# Patient Record
Sex: FEMALE | Race: OTHER | Employment: OTHER
[De-identification: names, ages, dates, MRNs, and addresses within clinical notes are randomized per-mention and may not be internally consistent; named-entity substitution may affect disease eponyms.]

---

## 2017-01-03 ENCOUNTER — CARE COORDINATION (OUTPATIENT)
Dept: CARE COORDINATION | Facility: CLINIC | Age: 77
End: 2017-01-03

## 2017-02-11 ENCOUNTER — HOSPITAL ENCOUNTER (OUTPATIENT)
Dept: MRI IMAGING | Age: 77
Discharge: HOME | End: 2017-02-11
Payer: MEDICARE

## 2017-02-11 ENCOUNTER — HOSPITAL ENCOUNTER (OUTPATIENT)
Dept: VASCULAR LAB | Age: 77
Discharge: HOME | End: 2017-02-11
Payer: MEDICARE

## 2017-02-11 DIAGNOSIS — R56.9 SEIZURE (HCC): ICD-10-CM

## 2017-02-11 DIAGNOSIS — R26.0 ATAXIC GAIT: ICD-10-CM

## 2017-02-11 DIAGNOSIS — E11.42 DIABETIC POLYRADICULOPATHY ASSOCIATED WITH TYPE 2 DIABETES MELLITUS (HCC): ICD-10-CM

## 2017-02-11 DIAGNOSIS — R55 SYNCOPE, UNSPECIFIED SYNCOPE TYPE: ICD-10-CM

## 2017-02-11 PROCEDURE — 70551 MRI BRAIN STEM W/O DYE: CPT

## 2017-02-11 PROCEDURE — 93880 EXTRACRANIAL BILAT STUDY: CPT

## 2017-02-11 PROCEDURE — 70551 MRI BRAIN STEM W/O DYE: CPT | Performed by: RADIOLOGY

## 2017-03-02 ENCOUNTER — CARE COORDINATION (OUTPATIENT)
Dept: CARE COORDINATION | Facility: CLINIC | Age: 77
End: 2017-03-02

## 2017-03-31 ENCOUNTER — HOSPITAL ENCOUNTER (OUTPATIENT)
Dept: GENERAL RADIOLOGY | Facility: CLINIC | Age: 77
Discharge: HOME | End: 2017-03-31
Payer: MEDICARE

## 2017-03-31 ENCOUNTER — HOSPITAL ENCOUNTER (OUTPATIENT)
Facility: CLINIC | Age: 77
Discharge: HOME | End: 2017-03-31
Payer: MEDICARE

## 2017-03-31 DIAGNOSIS — J40 BRONCHITIS: ICD-10-CM

## 2017-03-31 PROBLEM — C90.00 MULTIPLE MYELOMA (HCC): Status: ACTIVE | Noted: 2017-03-31

## 2017-03-31 PROBLEM — G40.909 SEIZURE DISORDER (HCC): Status: ACTIVE | Noted: 2017-03-31

## 2017-03-31 PROBLEM — G47.33 OSA (OBSTRUCTIVE SLEEP APNEA): Status: ACTIVE | Noted: 2017-03-31

## 2017-03-31 PROCEDURE — 71020 XR CHEST STANDARD TWO VW: CPT

## 2017-04-26 ENCOUNTER — CARE COORDINATION (OUTPATIENT)
Dept: CARE COORDINATION | Age: 77
End: 2017-04-26

## 2017-05-07 ENCOUNTER — HOSPITAL ENCOUNTER (EMERGENCY)
Age: 77
Discharge: HOME | End: 2017-05-07
Attending: EMERGENCY MEDICINE
Payer: MEDICARE

## 2017-05-07 VITALS
WEIGHT: 250 LBS | HEART RATE: 57 BPM | BODY MASS INDEX: 47.2 KG/M2 | OXYGEN SATURATION: 98 % | SYSTOLIC BLOOD PRESSURE: 138 MMHG | DIASTOLIC BLOOD PRESSURE: 61 MMHG | RESPIRATION RATE: 20 BRPM | HEIGHT: 61 IN

## 2017-05-07 DIAGNOSIS — E16.2 HYPOGLYCEMIA: Primary | ICD-10-CM

## 2017-05-07 LAB
ABSOLUTE EOS #: 0.1 K/UL (ref 0–0.4)
ABSOLUTE LYMPH #: 1.1 K/UL (ref 1–4.8)
ABSOLUTE MONO #: 0.5 K/UL (ref 0.1–1.3)
ANION GAP SERPL CALCULATED.3IONS-SCNC: 14 MMOL/L (ref 9–17)
BASOPHILS # BLD: 0 %
BASOPHILS ABSOLUTE: 0 K/UL (ref 0–0.2)
BUN BLDV-MCNC: 15 MG/DL (ref 8–23)
BUN/CREAT BLD: ABNORMAL (ref 9–20)
CALCIUM SERPL-MCNC: 9.4 MG/DL (ref 8.6–10.4)
CHLORIDE BLD-SCNC: 100 MMOL/L (ref 98–107)
CO2: 25 MMOL/L (ref 20–31)
CREAT SERPL-MCNC: 0.74 MG/DL (ref 0.5–0.9)
DIFFERENTIAL TYPE: ABNORMAL
EOSINOPHILS RELATIVE PERCENT: 1 %
GFR AFRICAN AMERICAN: >60 ML/MIN
GFR NON-AFRICAN AMERICAN: >60 ML/MIN
GFR SERPL CREATININE-BSD FRML MDRD: ABNORMAL ML/MIN/{1.73_M2}
GFR SERPL CREATININE-BSD FRML MDRD: ABNORMAL ML/MIN/{1.73_M2}
GLUCOSE BLD-MCNC: 133 MG/DL (ref 70–99)
GLUCOSE BLD-MCNC: 222 MG/DL (ref 65–105)
HCT VFR BLD CALC: 37.8 % (ref 36–46)
HEMOGLOBIN: 11.7 G/DL (ref 12–16)
LYMPHOCYTES # BLD: 9 %
MCH RBC QN AUTO: 28 PG (ref 26–34)
MCHC RBC AUTO-ENTMCNC: 30.9 G/DL (ref 31–37)
MCV RBC AUTO: 90.5 FL (ref 80–100)
MONOCYTES # BLD: 4 %
PDW BLD-RTO: 15.5 % (ref 11.5–14.9)
PLATELET # BLD: 195 K/UL (ref 150–450)
PLATELET ESTIMATE: ABNORMAL
PMV BLD AUTO: 8.7 FL (ref 6–12)
POTASSIUM SERPL-SCNC: 3.6 MMOL/L (ref 3.7–5.3)
RBC # BLD: 4.18 M/UL (ref 4–5.2)
RBC # BLD: ABNORMAL 10*6/UL
SEG NEUTROPHILS: 86 %
SEGMENTED NEUTROPHILS ABSOLUTE COUNT: 11.4 K/UL (ref 1.3–9.1)
SODIUM BLD-SCNC: 139 MMOL/L (ref 135–144)
WBC # BLD: 13.2 K/UL (ref 3.5–11)
WBC # BLD: ABNORMAL 10*3/UL

## 2017-05-07 PROCEDURE — 99285 EMERGENCY DEPT VISIT HI MDM: CPT

## 2017-05-07 PROCEDURE — 36415 COLL VENOUS BLD VENIPUNCTURE: CPT

## 2017-05-07 PROCEDURE — 82947 ASSAY GLUCOSE BLOOD QUANT: CPT

## 2017-05-07 PROCEDURE — 80048 BASIC METABOLIC PNL TOTAL CA: CPT

## 2017-05-07 PROCEDURE — 85025 COMPLETE CBC W/AUTO DIFF WBC: CPT

## 2017-05-07 ASSESSMENT — ENCOUNTER SYMPTOMS
SHORTNESS OF BREATH: 0
ABDOMINAL PAIN: 0
COUGH: 0
BACK PAIN: 0
GASTROINTESTINAL NEGATIVE: 1
RESPIRATORY NEGATIVE: 1
EYES NEGATIVE: 1

## 2017-05-07 ASSESSMENT — PAIN SCALES - GENERAL: PAINLEVEL_OUTOF10: 0

## 2017-05-08 LAB — GLUCOSE BLD-MCNC: 97 MG/DL (ref 65–105)

## 2017-06-01 ENCOUNTER — CARE COORDINATION (OUTPATIENT)
Dept: CARE COORDINATION | Age: 77
End: 2017-06-01

## 2017-07-24 ENCOUNTER — CARE COORDINATION (OUTPATIENT)
Dept: CARE COORDINATION | Age: 77
End: 2017-07-24

## 2017-08-02 ENCOUNTER — CARE COORDINATION (OUTPATIENT)
Dept: CARE COORDINATION | Age: 77
End: 2017-08-02

## 2017-09-05 ENCOUNTER — CARE COORDINATION (OUTPATIENT)
Dept: CARE COORDINATION | Age: 77
End: 2017-09-05

## 2017-09-07 PROBLEM — Z79.4 TYPE 2 DIABETES MELLITUS WITHOUT COMPLICATION, WITH LONG-TERM CURRENT USE OF INSULIN (HCC): Status: ACTIVE | Noted: 2017-09-07

## 2017-09-07 PROBLEM — S40.029A ARM BRUISE: Status: ACTIVE | Noted: 2017-09-07

## 2017-09-07 PROBLEM — E11.9 TYPE 2 DIABETES MELLITUS WITHOUT COMPLICATION, WITH LONG-TERM CURRENT USE OF INSULIN (HCC): Status: ACTIVE | Noted: 2017-09-07
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
## 2017-09-07 PROBLEM — I10 ESSENTIAL HYPERTENSION: Status: ACTIVE | Noted: 2017-09-07

## 2017-09-20 ENCOUNTER — CARE COORDINATION (OUTPATIENT)
Dept: CARE COORDINATION | Age: 77
End: 2017-09-20

## 2017-09-25 ENCOUNTER — APPOINTMENT (OUTPATIENT)
Dept: CT IMAGING | Age: 77
End: 2017-09-25
Payer: MEDICARE

## 2017-09-25 ENCOUNTER — APPOINTMENT (OUTPATIENT)
Dept: GENERAL RADIOLOGY | Age: 77
End: 2017-09-25
Payer: MEDICARE

## 2017-09-25 ENCOUNTER — APPOINTMENT (OUTPATIENT)
Dept: NUCLEAR MEDICINE | Age: 77
End: 2017-09-25
Payer: MEDICARE

## 2017-09-25 ENCOUNTER — HOSPITAL ENCOUNTER (EMERGENCY)
Age: 77
Discharge: HOME | End: 2017-09-25
Attending: EMERGENCY MEDICINE
Payer: MEDICARE

## 2017-09-25 VITALS
WEIGHT: 258 LBS | SYSTOLIC BLOOD PRESSURE: 162 MMHG | DIASTOLIC BLOOD PRESSURE: 72 MMHG | RESPIRATION RATE: 22 BRPM | OXYGEN SATURATION: 98 % | BODY MASS INDEX: 48.71 KG/M2 | HEIGHT: 61 IN | TEMPERATURE: 97.9 F | HEART RATE: 71 BPM

## 2017-09-25 DIAGNOSIS — N39.0 ACUTE UTI: ICD-10-CM

## 2017-09-25 DIAGNOSIS — S90.32XA CONTUSION OF LEFT FOOT, INITIAL ENCOUNTER: ICD-10-CM

## 2017-09-25 DIAGNOSIS — M25.511 ACUTE PAIN OF RIGHT SHOULDER: ICD-10-CM

## 2017-09-25 DIAGNOSIS — S05.11XA EYE CONTUSION, RIGHT, INITIAL ENCOUNTER: ICD-10-CM

## 2017-09-25 DIAGNOSIS — R55 SYNCOPE AND COLLAPSE: Primary | ICD-10-CM

## 2017-09-25 LAB
-: ABNORMAL
ABSOLUTE EOS #: 0.1 K/UL (ref 0–0.4)
ABSOLUTE LYMPH #: 0.9 K/UL (ref 1–4.8)
ABSOLUTE MONO #: 0.6 K/UL (ref 0.1–1.3)
ALBUMIN SERPL-MCNC: 3.4 G/DL (ref 3.5–5.2)
ALBUMIN/GLOBULIN RATIO: ABNORMAL (ref 1–2.5)
ALP BLD-CCNC: 106 U/L (ref 35–104)
ALT SERPL-CCNC: 45 U/L (ref 5–33)
AMORPHOUS: ABNORMAL
ANION GAP SERPL CALCULATED.3IONS-SCNC: 10 MMOL/L (ref 9–17)
AST SERPL-CCNC: 37 U/L
BACTERIA: ABNORMAL
BASOPHILS # BLD: 0 %
BASOPHILS ABSOLUTE: 0 K/UL (ref 0–0.2)
BILIRUB SERPL-MCNC: 0.48 MG/DL (ref 0.3–1.2)
BILIRUBIN URINE: NEGATIVE
BUN BLDV-MCNC: 15 MG/DL (ref 8–23)
BUN/CREAT BLD: ABNORMAL (ref 9–20)
CALCIUM SERPL-MCNC: 9.4 MG/DL (ref 8.6–10.4)
CASTS UA: ABNORMAL /LPF
CHLORIDE BLD-SCNC: 102 MMOL/L (ref 98–107)
CO2: 29 MMOL/L (ref 20–31)
COLOR: YELLOW
COMMENT UA: ABNORMAL
CREAT SERPL-MCNC: 0.84 MG/DL (ref 0.5–0.9)
CRYSTALS, UA: ABNORMAL /HPF
DIFFERENTIAL TYPE: ABNORMAL
EOSINOPHILS RELATIVE PERCENT: 2 %
EPITHELIAL CELLS UA: ABNORMAL /HPF
GFR AFRICAN AMERICAN: >60 ML/MIN
GFR NON-AFRICAN AMERICAN: >60 ML/MIN
GFR SERPL CREATININE-BSD FRML MDRD: ABNORMAL ML/MIN/{1.73_M2}
GFR SERPL CREATININE-BSD FRML MDRD: ABNORMAL ML/MIN/{1.73_M2}
GLUCOSE BLD-MCNC: 116 MG/DL (ref 70–99)
GLUCOSE URINE: NEGATIVE
HCT VFR BLD CALC: 32.9 % (ref 36–46)
HEMOGLOBIN: 10.7 G/DL (ref 12–16)
INR BLD: 1
KETONES, URINE: NEGATIVE
LEUKOCYTE ESTERASE, URINE: ABNORMAL
LYMPHOCYTES # BLD: 15 %
MCH RBC QN AUTO: 29.4 PG (ref 26–34)
MCHC RBC AUTO-ENTMCNC: 32.6 G/DL (ref 31–37)
MCV RBC AUTO: 90.4 FL (ref 80–100)
MONOCYTES # BLD: 11 %
MUCUS: ABNORMAL
NITRITE, URINE: NEGATIVE
OTHER OBSERVATIONS UA: ABNORMAL
PARTIAL THROMBOPLASTIN TIME: 25.3 SEC (ref 23–31)
PDW BLD-RTO: 17 % (ref 11.5–14.9)
PH UA: 6 (ref 5–8)
PLATELET # BLD: 179 K/UL (ref 150–450)
PLATELET ESTIMATE: ABNORMAL
PMV BLD AUTO: 8.2 FL (ref 6–12)
POTASSIUM SERPL-SCNC: 3.7 MMOL/L (ref 3.7–5.3)
PROTEIN UA: ABNORMAL
PROTHROMBIN TIME: 11 SEC (ref 9.7–12)
RBC # BLD: 3.64 M/UL (ref 4–5.2)
RBC # BLD: ABNORMAL 10*6/UL
RBC UA: ABNORMAL /HPF
RENAL EPITHELIAL, UA: ABNORMAL /HPF
SEG NEUTROPHILS: 72 %
SEGMENTED NEUTROPHILS ABSOLUTE COUNT: 4 K/UL (ref 1.3–9.1)
SODIUM BLD-SCNC: 141 MMOL/L (ref 135–144)
SPECIFIC GRAVITY UA: 1.01 (ref 1–1.03)
TOTAL PROTEIN: 6.6 G/DL (ref 6.4–8.3)
TRICHOMONAS: ABNORMAL
TROPONIN INTERP: NORMAL
TROPONIN INTERP: NORMAL
TROPONIN T: <0.03 NG/ML
TROPONIN T: <0.03 NG/ML
TSH SERPL DL<=0.05 MIU/L-ACNC: 4.71 MIU/L (ref 0.3–5)
TURBIDITY: ABNORMAL
URINE HGB: NEGATIVE
UROBILINOGEN, URINE: NORMAL
WBC # BLD: 5.6 K/UL (ref 3.5–11)
WBC # BLD: ABNORMAL 10*3/UL
WBC UA: ABNORMAL /HPF
YEAST: ABNORMAL

## 2017-09-25 PROCEDURE — 85730 THROMBOPLASTIN TIME PARTIAL: CPT

## 2017-09-25 PROCEDURE — 78580 LUNG PERFUSION IMAGING: CPT

## 2017-09-25 PROCEDURE — 6370000000 HC RX 637 (ALT 250 FOR IP): Performed by: EMERGENCY MEDICINE

## 2017-09-25 PROCEDURE — 36415 COLL VENOUS BLD VENIPUNCTURE: CPT

## 2017-09-25 PROCEDURE — 73630 X-RAY EXAM OF FOOT: CPT

## 2017-09-25 PROCEDURE — 84443 ASSAY THYROID STIM HORMONE: CPT

## 2017-09-25 PROCEDURE — 80053 COMPREHEN METABOLIC PANEL: CPT

## 2017-09-25 PROCEDURE — 87186 SC STD MICRODIL/AGAR DIL: CPT

## 2017-09-25 PROCEDURE — 70450 CT HEAD/BRAIN W/O DYE: CPT

## 2017-09-25 PROCEDURE — 87086 URINE CULTURE/COLONY COUNT: CPT

## 2017-09-25 PROCEDURE — A9540 TC99M MAA: HCPCS | Performed by: EMERGENCY MEDICINE

## 2017-09-25 PROCEDURE — 72125 CT NECK SPINE W/O DYE: CPT

## 2017-09-25 PROCEDURE — 3430000000 HC RX DIAGNOSTIC RADIOPHARMACEUTICAL: Performed by: EMERGENCY MEDICINE

## 2017-09-25 PROCEDURE — 73030 X-RAY EXAM OF SHOULDER: CPT

## 2017-09-25 PROCEDURE — 87077 CULTURE AEROBIC IDENTIFY: CPT

## 2017-09-25 PROCEDURE — 2580000003 HC RX 258: Performed by: EMERGENCY MEDICINE

## 2017-09-25 PROCEDURE — 99285 EMERGENCY DEPT VISIT HI MDM: CPT

## 2017-09-25 PROCEDURE — 71010 XR CHEST PORTABLE: CPT

## 2017-09-25 PROCEDURE — 84484 ASSAY OF TROPONIN QUANT: CPT

## 2017-09-25 PROCEDURE — 85025 COMPLETE CBC W/AUTO DIFF WBC: CPT

## 2017-09-25 PROCEDURE — 93005 ELECTROCARDIOGRAM TRACING: CPT

## 2017-09-25 PROCEDURE — A9538 TC99M PYROPHOSPHATE: HCPCS | Performed by: EMERGENCY MEDICINE

## 2017-09-25 PROCEDURE — 81001 URINALYSIS AUTO W/SCOPE: CPT

## 2017-09-25 PROCEDURE — 85610 PROTHROMBIN TIME: CPT

## 2017-09-25 RX ORDER — SODIUM CHLORIDE 0.9 % (FLUSH) 0.9 %
10 SYRINGE (ML) INJECTION PRN
Status: DISCONTINUED | OUTPATIENT
Start: 2017-09-25 | End: 2017-09-25 | Stop reason: HOSPADM

## 2017-09-25 RX ORDER — CEPHALEXIN 500 MG/1
500 CAPSULE ORAL 2 TIMES DAILY
Qty: 20 CAPSULE | Refills: 0 | Status: SHIPPED | OUTPATIENT
Start: 2017-09-25 | End: 2017-10-20

## 2017-09-25 RX ORDER — 0.9 % SODIUM CHLORIDE 0.9 %
100 INTRAVENOUS SOLUTION INTRAVENOUS ONCE
Status: DISCONTINUED | OUTPATIENT
Start: 2017-09-25 | End: 2017-09-25 | Stop reason: HOSPADM

## 2017-09-25 RX ORDER — CEPHALEXIN 250 MG/1
500 CAPSULE ORAL ONCE
Status: COMPLETED | OUTPATIENT
Start: 2017-09-25 | End: 2017-09-25

## 2017-09-25 RX ADMIN — Medication 8 MILLICURIE: at 15:43

## 2017-09-25 RX ADMIN — Medication 40 MILLICURIE: at 15:22

## 2017-09-25 RX ADMIN — CEPHALEXIN 500 MG: 250 CAPSULE ORAL at 18:11

## 2017-09-25 RX ADMIN — Medication 10 ML: at 15:43

## 2017-09-25 ASSESSMENT — PAIN SCALES - GENERAL: PAINLEVEL_OUTOF10: 8

## 2017-09-25 ASSESSMENT — PAIN DESCRIPTION - ORIENTATION: ORIENTATION: RIGHT

## 2017-09-25 ASSESSMENT — PAIN DESCRIPTION - LOCATION: LOCATION: SHOULDER

## 2017-09-25 NOTE — ED AVS SNAPSHOT
After Visit Summary  (Discharge Instructions)    Medication List for Home    Based on the information you provided to us as well as any changes during this visit, the following is your updated medication list.  Compare this with your prescription bottles at home. If you have any questions or concerns, contact your primary care physician's office. Daily Medication List (This medication list can be shared with any Healthcare provider who is helping you manage your medications)      There are NEW medications for you. START taking them after you leave the hospital     cephALEXin 500 MG capsule   Commonly known as:  KEFLEX   Take 1 capsule by mouth 2 times daily         These are medications you told us you were taking at home, CONTINUE taking them after you leave the hospital     acetaminophen 500 MG tablet   Commonly known as:  APAP EXTRA STRENGTH   Take 2 tablets by mouth every 6 hours as needed for Pain. acyclovir 200 MG capsule   Commonly known as:  ZOVIRAX   Take 200 mg by mouth       albuterol sulfate  (90 Base) MCG/ACT inhaler   Commonly known as:  PROVENTIL HFA   Inhale 2 puffs into the lungs every 6 hours as needed for Wheezing       allopurinol 300 MG tablet   Commonly known as:  ZYLOPRIM   Take 1 tablet by mouth daily       aspirin 81 MG EC tablet   Take 81 mg by mouth daily. atorvastatin 20 MG tablet   Commonly known as:  LIPITOR   TAKE 1 TABLET BY MOUTH ONE TIME A DAY       Biotin 5000 MCG Tabs   Take  by mouth daily.        bumetanide 2 MG tablet   Commonly known as:  BUMEX   Take 1 mg by mouth daily       carvedilol 25 MG tablet   Commonly known as:  COREG   TAKE ONE TABLET BY MOUTH TWICE A DAY WITH FOOD       clopidogrel 75 MG tablet   Commonly known as:  PLAVIX   Take 75 mg by mouth daily       dexamethasone 4 MG tablet   Commonly known as:  DECADRON   Take 3 tablets (12mg) once per week on days of Velcade and Cytoxan After Visit Summary    This summary was created for you. Thank you for entrusting your care to us. The following information includes details about your hospital/visit stay along with steps you should take to help with your recovery once you leave the hospital.  In this packet, you will find information about the topics listed below:    · Instructions about your medications including a list of your home medications  · A summary of your hospital visit  · Follow-up appointments once you have left the hospital  · Your care plan at home      You may receive a survey regarding the care you received during your stay. Your input is valuable to us. We encourage you to complete and return your survey in the envelope provided. We hope you will choose us in the future for your healthcare needs. Patient Information     Patient Name LAVERNE Naik Maxx 1940      Care Provided at:     Name Address Phone       [address]  [place] [phone]            Your Visit    Here you will find information about your visit, including the reason for your visit. Please take this sheet with you when you visit your doctor or other health care provider in the future. It will help determine the best possible medical care for you at that time. If you have any questions once you leave the hospital, please call the department phone number listed below. Diagnoses this visit     Your diagnoses were SYNCOPE AND COLLAPSE, EYE CONTUSION, RIGHT, INITIAL ENCOUNTER, ACUTE PAIN OF RIGHT SHOULDER, CONTUSION OF LEFT FOOT, INITIAL ENCOUNTER, and ACUTE UTI. Visit Information     Date of Visit Department Dept Phone    2017 [facility] -[phone]      You were seen by     You were seen by Spike Hernandez DO. Follow-up Appointments    Below is a list of your follow-up and future appointments.  This may not be a complete list as you may have made appointments directly with providers that we are not aware of or your providers may have made some for you. Please call your providers to confirm appointments. It is important to keep your appointments. Please bring your current insurance card, photo ID, co-pay, and all medication bottles to your appointment. If self-pay, payment is expected at the time of service. Follow-up Information     Schedule an appointment as soon as possible for a visit with Erick Bush MD.    Specialty:  Family Medicine    Contact information:    [address]  [address] [facility],[address]  [address] [facility] [address]  [phone]          Follow up with [facility] ED.     Specialty:  Emergency Medicine    Why:  As needed, If symptoms worsen    Contact information:    [address] [address] [phone]      Future Appointments     10/30/2017 8:30 AM     Appointment with Antoine Lujan CNP at [address] [place] [address] ([phone])   Please arrive 15 minutes prior to appointment time, bring insurance card and photo ID.    [address] [facility]  [address] [place] [address]  [place] [address]       11/2/2017 11:00 AM     Appointment with Angélica Srinivasan MD at [facility] ([phone])   Please arrive 15 minutes prior to appointment time, bring insurance card and photo ID.    [id number] [facility]         Preventive Care        Date Due    Tetanus Combination Vaccine (1 - Tdap) 11/25/1959    Pneumococcal Vaccines (two) for age 72 years & over with: cerebrospinal fluid leaks, cochlear implants, hemoglobinopathies,  asplenia, immunodeficiencies, HIV infection, or chronic renal failure (1 of 2 - PCV13) 11/25/2004    Diabetic Foot Exam 10/11/2013    Yearly Flu Vaccine (1) 9/1/2017    Cholesterol Screening 9/15/2021            Ordered Labs Pending Results     Order Current Status    Urine culture clean catch In process Care Plan Once You Return Home    This section includes instructions you will need to follow once you leave the hospital.  Your care team will discuss these with you, so you and those caring for you know how to best care for your health needs at home. This section may also include educational information about certain health topics that may be of help to you. Important Information if you smoke or are exposed to smoking       SMOKING: QUIT SMOKING. THIS IS THE MOST IMPORTANT ACTION YOU CAN TAKE TO IMPROVE YOUR CURRENT AND FUTURE HEALTH. Call the [facility] NOW ([phone])    Smoking harms nonsmokers. When nonsmokers are around people who smoke, they absorb nicotine, carbon monoxide, and other ingredients of tobacco smoke. DO NOT SMOKE AROUND CHILDREN     Children exposed to secondhand smoke are at an increased risk of:  Sudden Infant Death Syndrome (SIDS), acute respiratory infections, inflammation of the middle ear, and severe asthma. Over a longer time, it causes heart disease and lung cancer. There is no safe level of exposure to secondhand smoke. [employer] Signup     Our records indicate that you have an active [employer] account. You can view your After Visit Summary by going to [email or web address] and logging in with your [employer] username and password. If you don't have a [employer] username and password but a parent or guardian has access to your record, the parent or guardian should login with their own [employer] username and password and access your record to view the After Visit Summary. Additional Information  If you have questions, please contact the physician practice where you receive care. Remember, [employer] is NOT to be used for urgent needs. For medical emergencies, dial 911. For questions regarding your [employer] account call [phone].  If you · You are not getting better after taking an antibiotic for 2 days. · Your symptoms go away but then come back. Where can you learn more? Go to [email or web address] and sign in to your BeQuan account. Enter T274 in the INTREorg SYSTEMS box to learn more about \"Urinary Tract Infection in Women: Care Instructions. \"     If you do not have an account, please click on the \"Sign Up Now\" link. Current as of: November 28, 2016  Content Version: 11.3  © 8030-3613 [employer]. Care instructions adapted under license by [facility] ([facility]). If you have questions about a medical condition or this instruction, always ask your healthcare professional. [address] any warranty or liability for your use of this information. Please take all medications as prescribed. Please follow up with your primary care physician (PCP) by calling tomorrow for the next available appointment. If you do not have a PCP please establish care by calling the clinic or a physician listed below. Please return to emergency department sooner if you develop any worsening symptoms, uncontrolled fevers, uncontrolled vomiting, or any other concerns. Fainting: Care Instructions  Your Care Instructions    When you faint, or pass out, you lose consciousness for a short time. A brief drop in blood flow to the brain often causes it. When you fall or lie down, more blood flows to your brain and you regain consciousness. Emotional stress, pain, or overheatingespecially if you have been standingcan make you faint. In these cases, fainting is usually not serious. But fainting can be a sign of a more serious problem. Your doctor may want you to have more tests to rule out other causes. The treatment you need depends on the reason why you fainted. The doctor has checked you carefully, but problems can develop later. If you notice any problems or new symptoms, get medical treatment right away. ¨ If you are not taking a prescription pain medicine, ask your doctor if you can take an over-the-counter medicine. · If you can, prop up the sore area on pillows as much as possible for the next few days. Try to keep the sore area above the level of your heart. When should you call for help? Call your doctor now or seek immediate medical care if:  · Your pain gets worse. · You have new or worse swelling. · You have tingling, weakness, or numbness in the area near the contusion. · The area near the contusion is cold or pale. Watch closely for changes in your health, and be sure to contact your doctor if:  · You do not get better as expected. Where can you learn more? Go to [email or web address] and sign in to your Tapulous account. Enter X142 in the LeaderNation box to learn more about \"Contusion: Care Instructions. \"     If you do not have an account, please click on the \"Sign Up Now\" link. Current as of: March 20, 2017  Content Version: 11.3  © 5958-6568 [employer]. Care instructions adapted under license by [facility] ([facility]). If you have questions about a medical condition or this instruction, always ask your healthcare professional. [address] any warranty or liability for your use of this information.

## 2017-09-25 NOTE — ED NOTES
Bed: 14  Expected date:   Expected time:   Means of arrival:   Comments:  [address] Ofelia Alvarez, [facility]  09/25/17 0826

## 2017-09-25 NOTE — ED NOTES
Pt reported that she is not supposed to received IVP dye per Dr. Hever Donald. Dr. Hernandez [facility] notified.      Kole Justice RN  09/25/17 4002

## 2017-09-25 NOTE — ED NOTES
Pt ambulated to bathroom without assistance or difficulty; gait even and steady.       Eulalia Good RN  09/25/17 9241

## 2017-09-25 NOTE — ED PROVIDER NOTES
extraocular muscle entrapment noted on exam.  No crepitus. She is neurologically intact. We'll get broad cardiac workup as well as trauma workup including head CT, cervical spine CT, x-ray of left foot and right shoulder. DIAGNOSTIC RESULTS     EKG: All EKG's are interpreted by the Emergency Department Physician who either signs or Co-signs this chart in the absence of a cardiologist.    EKG Interpretation    Interpreted by me    Rhythm: normal sinus   Rate: normal  Axis: normal  Ectopy: none  Conduction: normal  ST Segments: no acute change  T Waves: no acute change  Q Waves: none    Clinical Impression: no acute changes and normal EKG    RADIOLOGY:   I directly visualized the following  images and reviewed the radiologist interpretations:  CT Head WO Contrast   Final Result   Right forehead soft tissue injury without acute intracranial hemorrhage. XR FOOT LEFT (MIN 3 VIEWS)   Final Result   No acute osseous injury is identified. XR SHOULDER RIGHT (MIN 2 VIEWS)   Final Result   1. No acute bony or joint abnormality   2. AC joint osteoarthritic changes with findings suggesting impingement         CT Cervical Spine WO Contrast   Final Result   No acute abnormality of the cervical spine. Other findings as above. XR Chest Portable   Final Result   No acute process.          NM LUNG SCAN PERFUSION ONLY    (Results Pending)           ED BEDSIDE ULTRASOUND:      LABS:  Labs Reviewed   CBC WITH AUTO DIFFERENTIAL - Abnormal; Notable for the following:        Result Value    RBC 3.64 (*)     Hemoglobin 10.7 (*)     Hematocrit 32.9 (*)     RDW 17.0 (*)     Absolute Lymph # 0.90 (*)     All other components within normal limits   COMPREHENSIVE METABOLIC PANEL - Abnormal; Notable for the following:     Glucose 116 (*)     Alkaline Phosphatase 106 (*)     ALT 45 (*)     AST 37 (*)     Alb 3.4 (*)     All other components within normal limits   TROPONIN   APTT   PROTIME-INR   TSH WITH REFLEX

## 2017-09-26 ENCOUNTER — CARE COORDINATION (OUTPATIENT)
Dept: CARE COORDINATION | Age: 77
End: 2017-09-26

## 2017-09-27 LAB
CULTURE: ABNORMAL
CULTURE: ABNORMAL
Lab: ABNORMAL
ORGANISM: ABNORMAL
SPECIMEN DESCRIPTION: ABNORMAL
SPECIMEN DESCRIPTION: ABNORMAL
STATUS: ABNORMAL

## 2017-09-28 LAB
EKG ATRIAL RATE: 64 BPM
EKG P AXIS: 6 DEGREES
EKG P-R INTERVAL: 164 MS
EKG Q-T INTERVAL: 434 MS
EKG QRS DURATION: 82 MS
EKG QTC CALCULATION (BAZETT): 447 MS
EKG R AXIS: 15 DEGREES
EKG T AXIS: 29 DEGREES
EKG VENTRICULAR RATE: 64 BPM

## 2017-09-29 ENCOUNTER — HOSPITAL ENCOUNTER (OUTPATIENT)
Dept: GENERAL RADIOLOGY | Facility: CLINIC | Age: 77
Discharge: HOME | End: 2017-09-29
Payer: MEDICARE

## 2017-09-29 ENCOUNTER — HOSPITAL ENCOUNTER (OUTPATIENT)
Facility: CLINIC | Age: 77
Discharge: HOME | End: 2017-09-29
Payer: MEDICARE

## 2017-09-29 DIAGNOSIS — S00.83XS FACIAL CONTUSION, SEQUELA: ICD-10-CM

## 2017-09-29 PROBLEM — R06.09 DYSPNEA ON EXERTION: Status: ACTIVE | Noted: 2017-09-29

## 2017-09-29 PROCEDURE — 70140 X-RAY EXAM OF FACIAL BONES: CPT

## 2017-10-20 ENCOUNTER — HOSPITAL ENCOUNTER (INPATIENT)
Age: 77
LOS: 3 days | Discharge: HOME | DRG: 683 | End: 2017-10-23
Attending: EMERGENCY MEDICINE | Admitting: INTERNAL MEDICINE
Payer: MEDICARE

## 2017-10-20 DIAGNOSIS — N17.9 ACUTE RENAL FAILURE, UNSPECIFIED ACUTE RENAL FAILURE TYPE (HCC): ICD-10-CM

## 2017-10-20 DIAGNOSIS — E86.0 DEHYDRATION: ICD-10-CM

## 2017-10-20 DIAGNOSIS — A09 INFECTIOUS DIARRHEA: Primary | ICD-10-CM

## 2017-10-20 LAB
ABSOLUTE EOS #: 0 K/UL (ref 0–0.4)
ABSOLUTE IMMATURE GRANULOCYTE: ABNORMAL K/UL (ref 0–0.3)
ABSOLUTE LYMPH #: 1.5 K/UL (ref 1–4.8)
ABSOLUTE MONO #: 1.1 K/UL (ref 0.1–1.3)
ALBUMIN SERPL-MCNC: 3.9 G/DL (ref 3.5–5.2)
ALBUMIN/GLOBULIN RATIO: ABNORMAL (ref 1–2.5)
ALP BLD-CCNC: 114 U/L (ref 35–104)
ALT SERPL-CCNC: 27 U/L (ref 5–33)
ANION GAP SERPL CALCULATED.3IONS-SCNC: 16 MMOL/L (ref 9–17)
AST SERPL-CCNC: 32 U/L
BASOPHILS # BLD: 0 %
BASOPHILS ABSOLUTE: 0 K/UL (ref 0–0.2)
BILIRUB SERPL-MCNC: 0.36 MG/DL (ref 0.3–1.2)
BILIRUBIN DIRECT: 0.12 MG/DL
BILIRUBIN, INDIRECT: 0.24 MG/DL (ref 0–1)
BUN BLDV-MCNC: 47 MG/DL (ref 8–23)
BUN/CREAT BLD: ABNORMAL (ref 9–20)
CALCIUM SERPL-MCNC: 9.9 MG/DL (ref 8.6–10.4)
CHLORIDE BLD-SCNC: 96 MMOL/L (ref 98–107)
CO2: 21 MMOL/L (ref 20–31)
CREAT SERPL-MCNC: 2.46 MG/DL (ref 0.5–0.9)
DIFFERENTIAL TYPE: ABNORMAL
EKG ATRIAL RATE: 69 BPM
EKG P AXIS: 61 DEGREES
EKG P-R INTERVAL: 166 MS
EKG Q-T INTERVAL: 412 MS
EKG QRS DURATION: 86 MS
EKG QTC CALCULATION (BAZETT): 441 MS
EKG R AXIS: 17 DEGREES
EKG T AXIS: 11 DEGREES
EKG VENTRICULAR RATE: 69 BPM
EOSINOPHILS RELATIVE PERCENT: 0 %
GFR AFRICAN AMERICAN: 23 ML/MIN
GFR NON-AFRICAN AMERICAN: 19 ML/MIN
GFR SERPL CREATININE-BSD FRML MDRD: ABNORMAL ML/MIN/{1.73_M2}
GFR SERPL CREATININE-BSD FRML MDRD: ABNORMAL ML/MIN/{1.73_M2}
GLOBULIN: ABNORMAL G/DL (ref 1.5–3.8)
GLUCOSE BLD-MCNC: 268 MG/DL (ref 70–99)
HCT VFR BLD CALC: 36 % (ref 36–46)
HEMOGLOBIN: 11.7 G/DL (ref 12–16)
IMMATURE GRANULOCYTES: ABNORMAL %
LYMPHOCYTES # BLD: 12 %
MAGNESIUM: 2 MG/DL (ref 1.6–2.6)
MCH RBC QN AUTO: 29.7 PG (ref 26–34)
MCHC RBC AUTO-ENTMCNC: 32.3 G/DL (ref 31–37)
MCV RBC AUTO: 91.7 FL (ref 80–100)
MONOCYTES # BLD: 9 %
PDW BLD-RTO: 16.5 % (ref 11.5–14.9)
PLATELET # BLD: 276 K/UL (ref 150–450)
PLATELET ESTIMATE: ABNORMAL
PMV BLD AUTO: 8.1 FL (ref 6–12)
POTASSIUM SERPL-SCNC: 4.4 MMOL/L (ref 3.7–5.3)
RBC # BLD: 3.93 M/UL (ref 4–5.2)
RBC # BLD: ABNORMAL 10*6/UL
SEG NEUTROPHILS: 79 %
SEGMENTED NEUTROPHILS ABSOLUTE COUNT: 10 K/UL (ref 1.3–9.1)
SODIUM BLD-SCNC: 133 MMOL/L (ref 135–144)
TOTAL PROTEIN: 7.8 G/DL (ref 6.4–8.3)
TROPONIN INTERP: NORMAL
TROPONIN T: <0.03 NG/ML
WBC # BLD: 12.6 K/UL (ref 3.5–11)
WBC # BLD: ABNORMAL 10*3/UL

## 2017-10-20 PROCEDURE — 93005 ELECTROCARDIOGRAM TRACING: CPT

## 2017-10-20 PROCEDURE — 99285 EMERGENCY DEPT VISIT HI MDM: CPT

## 2017-10-20 PROCEDURE — 1200000000 HC SEMI PRIVATE

## 2017-10-20 PROCEDURE — 2580000003 HC RX 258: Performed by: EMERGENCY MEDICINE

## 2017-10-20 PROCEDURE — 84484 ASSAY OF TROPONIN QUANT: CPT

## 2017-10-20 PROCEDURE — 85025 COMPLETE CBC W/AUTO DIFF WBC: CPT

## 2017-10-20 PROCEDURE — 80048 BASIC METABOLIC PNL TOTAL CA: CPT

## 2017-10-20 PROCEDURE — 80076 HEPATIC FUNCTION PANEL: CPT

## 2017-10-20 PROCEDURE — 83735 ASSAY OF MAGNESIUM: CPT

## 2017-10-20 PROCEDURE — 87493 C DIFF AMPLIFIED PROBE: CPT

## 2017-10-20 PROCEDURE — 87505 NFCT AGENT DETECTION GI: CPT

## 2017-10-20 PROCEDURE — 36415 COLL VENOUS BLD VENIPUNCTURE: CPT

## 2017-10-20 RX ORDER — 0.9 % SODIUM CHLORIDE 0.9 %
500 INTRAVENOUS SOLUTION INTRAVENOUS ONCE
Status: COMPLETED | OUTPATIENT
Start: 2017-10-20 | End: 2017-10-20

## 2017-10-20 RX ADMIN — SODIUM CHLORIDE 500 ML: 9 INJECTION, SOLUTION INTRAVENOUS at 22:53

## 2017-10-20 ASSESSMENT — ENCOUNTER SYMPTOMS
ABDOMINAL DISTENTION: 0
CONSTIPATION: 0
VOMITING: 0
DIARRHEA: 1
SHORTNESS OF BREATH: 0
ABDOMINAL PAIN: 0
COUGH: 0
BACK PAIN: 0
NAUSEA: 1

## 2017-10-21 LAB
ANION GAP SERPL CALCULATED.3IONS-SCNC: 13 MMOL/L (ref 9–17)
BUN BLDV-MCNC: 47 MG/DL (ref 8–23)
BUN/CREAT BLD: ABNORMAL (ref 9–20)
C DIFFICILE TOXINS, PCR: NORMAL
CALCIUM SERPL-MCNC: 9.1 MG/DL (ref 8.6–10.4)
CAMPYLOBACTER PCR: NORMAL
CHLORIDE BLD-SCNC: 101 MMOL/L (ref 98–107)
CO2: 21 MMOL/L (ref 20–31)
CREAT SERPL-MCNC: 2.08 MG/DL (ref 0.5–0.9)
GFR AFRICAN AMERICAN: 28 ML/MIN
GFR NON-AFRICAN AMERICAN: 23 ML/MIN
GFR SERPL CREATININE-BSD FRML MDRD: ABNORMAL ML/MIN/{1.73_M2}
GFR SERPL CREATININE-BSD FRML MDRD: ABNORMAL ML/MIN/{1.73_M2}
GLUCOSE BLD-MCNC: 278 MG/DL (ref 70–99)
GLUCOSE BLD-MCNC: 392 MG/DL (ref 65–105)
GLUCOSE BLD-MCNC: 425 MG/DL (ref 65–105)
HCT VFR BLD CALC: 35 % (ref 36–46)
HEMOGLOBIN: 11.4 G/DL (ref 12–16)
MCH RBC QN AUTO: 29.7 PG (ref 26–34)
MCHC RBC AUTO-ENTMCNC: 32.4 G/DL (ref 31–37)
MCV RBC AUTO: 91.7 FL (ref 80–100)
PDW BLD-RTO: 16.4 % (ref 11.5–14.9)
PLATELET # BLD: 230 K/UL (ref 150–450)
PMV BLD AUTO: 7.4 FL (ref 6–12)
POTASSIUM SERPL-SCNC: 4.6 MMOL/L (ref 3.7–5.3)
RBC # BLD: 3.82 M/UL (ref 4–5.2)
SALMONELLA PCR: NORMAL
SHIGATOXIN GENE PCR: NORMAL
SHIGELLA SP PCR: NORMAL
SODIUM BLD-SCNC: 135 MMOL/L (ref 135–144)
SPECIMEN DESCRIPTION: NORMAL
SPECIMEN: NORMAL
WBC # BLD: 8.7 K/UL (ref 3.5–11)

## 2017-10-21 PROCEDURE — 36415 COLL VENOUS BLD VENIPUNCTURE: CPT

## 2017-10-21 PROCEDURE — 6360000002 HC RX W HCPCS: Performed by: INTERNAL MEDICINE

## 2017-10-21 PROCEDURE — 6370000000 HC RX 637 (ALT 250 FOR IP): Performed by: EMERGENCY MEDICINE

## 2017-10-21 PROCEDURE — 97161 PT EVAL LOW COMPLEX 20 MIN: CPT

## 2017-10-21 PROCEDURE — 2580000003 HC RX 258: Performed by: INTERNAL MEDICINE

## 2017-10-21 PROCEDURE — 80048 BASIC METABOLIC PNL TOTAL CA: CPT

## 2017-10-21 PROCEDURE — 99223 1ST HOSP IP/OBS HIGH 75: CPT | Performed by: INTERNAL MEDICINE

## 2017-10-21 PROCEDURE — 94664 DEMO&/EVAL PT USE INHALER: CPT

## 2017-10-21 PROCEDURE — 2500000003 HC RX 250 WO HCPCS: Performed by: EMERGENCY MEDICINE

## 2017-10-21 PROCEDURE — 1200000000 HC SEMI PRIVATE

## 2017-10-21 PROCEDURE — 85027 COMPLETE CBC AUTOMATED: CPT

## 2017-10-21 PROCEDURE — 2500000003 HC RX 250 WO HCPCS: Performed by: INTERNAL MEDICINE

## 2017-10-21 PROCEDURE — 82947 ASSAY GLUCOSE BLOOD QUANT: CPT

## 2017-10-21 PROCEDURE — 6370000000 HC RX 637 (ALT 250 FOR IP): Performed by: INTERNAL MEDICINE

## 2017-10-21 RX ORDER — ONDANSETRON 2 MG/ML
4 INJECTION INTRAMUSCULAR; INTRAVENOUS EVERY 8 HOURS PRN
Status: DISCONTINUED | OUTPATIENT
Start: 2017-10-21 | End: 2017-10-23 | Stop reason: HOSPADM

## 2017-10-21 RX ORDER — MAGNESIUM HYDROXIDE/ALUMINUM HYDROXICE/SIMETHICONE 120; 1200; 1200 MG/30ML; MG/30ML; MG/30ML
30 SUSPENSION ORAL ONCE
Status: COMPLETED | OUTPATIENT
Start: 2017-10-21 | End: 2017-10-21

## 2017-10-21 RX ORDER — ASPIRIN 81 MG/1
81 TABLET ORAL DAILY
Status: DISCONTINUED | OUTPATIENT
Start: 2017-10-21 | End: 2017-10-23 | Stop reason: HOSPADM

## 2017-10-21 RX ORDER — SODIUM CHLORIDE 0.9 % (FLUSH) 0.9 %
10 SYRINGE (ML) INJECTION PRN
Status: DISCONTINUED | OUTPATIENT
Start: 2017-10-21 | End: 2017-10-23 | Stop reason: HOSPADM

## 2017-10-21 RX ORDER — ISOSORBIDE MONONITRATE 30 MG/1
30 TABLET, EXTENDED RELEASE ORAL DAILY
Status: DISCONTINUED | OUTPATIENT
Start: 2017-10-21 | End: 2017-10-23 | Stop reason: HOSPADM

## 2017-10-21 RX ORDER — LEVOTHYROXINE SODIUM 0.05 MG/1
50 TABLET ORAL DAILY
Status: DISCONTINUED | OUTPATIENT
Start: 2017-10-21 | End: 2017-10-23 | Stop reason: HOSPADM

## 2017-10-21 RX ORDER — DEXTROSE MONOHYDRATE 25 G/50ML
12.5 INJECTION, SOLUTION INTRAVENOUS PRN
Status: DISCONTINUED | OUTPATIENT
Start: 2017-10-21 | End: 2017-10-23 | Stop reason: HOSPADM

## 2017-10-21 RX ORDER — ALBUTEROL SULFATE 90 UG/1
2 AEROSOL, METERED RESPIRATORY (INHALATION) EVERY 6 HOURS PRN
Status: DISCONTINUED | OUTPATIENT
Start: 2017-10-21 | End: 2017-10-23 | Stop reason: HOSPADM

## 2017-10-21 RX ORDER — CLOPIDOGREL BISULFATE 75 MG/1
75 TABLET ORAL DAILY
Status: DISCONTINUED | OUTPATIENT
Start: 2017-10-21 | End: 2017-10-23 | Stop reason: HOSPADM

## 2017-10-21 RX ORDER — SODIUM CHLORIDE 0.9 % (FLUSH) 0.9 %
10 SYRINGE (ML) INJECTION EVERY 12 HOURS SCHEDULED
Status: DISCONTINUED | OUTPATIENT
Start: 2017-10-21 | End: 2017-10-23 | Stop reason: HOSPADM

## 2017-10-21 RX ORDER — LEVOTHYROXINE SODIUM 0.05 MG/1
1 TABLET ORAL DAILY
Status: CANCELLED | OUTPATIENT
Start: 2017-10-21

## 2017-10-21 RX ORDER — NICOTINE POLACRILEX 4 MG
15 LOZENGE BUCCAL PRN
Status: DISCONTINUED | OUTPATIENT
Start: 2017-10-21 | End: 2017-10-23 | Stop reason: HOSPADM

## 2017-10-21 RX ORDER — NITROGLYCERIN 0.4 MG/1
0.4 TABLET SUBLINGUAL EVERY 5 MIN PRN
Status: DISCONTINUED | OUTPATIENT
Start: 2017-10-21 | End: 2017-10-23 | Stop reason: HOSPADM

## 2017-10-21 RX ORDER — PANTOPRAZOLE SODIUM 20 MG/1
20 TABLET, DELAYED RELEASE ORAL DAILY
Status: DISCONTINUED | OUTPATIENT
Start: 2017-10-21 | End: 2017-10-23 | Stop reason: HOSPADM

## 2017-10-21 RX ORDER — CARVEDILOL 12.5 MG/1
12.5 TABLET ORAL 2 TIMES DAILY
Status: DISCONTINUED | OUTPATIENT
Start: 2017-10-21 | End: 2017-10-23 | Stop reason: HOSPADM

## 2017-10-21 RX ORDER — DEXTROSE MONOHYDRATE 50 MG/ML
100 INJECTION, SOLUTION INTRAVENOUS PRN
Status: DISCONTINUED | OUTPATIENT
Start: 2017-10-21 | End: 2017-10-23 | Stop reason: HOSPADM

## 2017-10-21 RX ORDER — ATORVASTATIN CALCIUM 20 MG/1
20 TABLET, FILM COATED ORAL DAILY
Status: DISCONTINUED | OUTPATIENT
Start: 2017-10-21 | End: 2017-10-23 | Stop reason: HOSPADM

## 2017-10-21 RX ORDER — CIPROFLOXACIN 2 MG/ML
400 INJECTION, SOLUTION INTRAVENOUS EVERY 24 HOURS
Status: DISCONTINUED | OUTPATIENT
Start: 2017-10-21 | End: 2017-10-23 | Stop reason: HOSPADM

## 2017-10-21 RX ORDER — CHOLECALCIFEROL (VITAMIN D3) 1250 MCG
1 CAPSULE ORAL
Status: DISCONTINUED | OUTPATIENT
Start: 2017-10-21 | End: 2017-10-21

## 2017-10-21 RX ORDER — ACETAMINOPHEN 325 MG/1
650 TABLET ORAL EVERY 4 HOURS PRN
Status: DISCONTINUED | OUTPATIENT
Start: 2017-10-21 | End: 2017-10-23 | Stop reason: HOSPADM

## 2017-10-21 RX ORDER — SODIUM CHLORIDE 9 MG/ML
INJECTION, SOLUTION INTRAVENOUS CONTINUOUS
Status: DISCONTINUED | OUTPATIENT
Start: 2017-10-21 | End: 2017-10-23 | Stop reason: HOSPADM

## 2017-10-21 RX ORDER — MAGNESIUM HYDROXIDE/ALUMINUM HYDROXICE/SIMETHICONE 120; 1200; 1200 MG/30ML; MG/30ML; MG/30ML
30 SUSPENSION ORAL EVERY 6 HOURS PRN
Status: DISCONTINUED | OUTPATIENT
Start: 2017-10-21 | End: 2017-10-23 | Stop reason: HOSPADM

## 2017-10-21 RX ORDER — ERGOCALCIFEROL (VITAMIN D2) 1250 MCG
50000 CAPSULE ORAL WEEKLY
Status: DISCONTINUED | OUTPATIENT
Start: 2017-10-21 | End: 2017-10-23 | Stop reason: HOSPADM

## 2017-10-21 RX ORDER — LANOLIN ALCOHOL/MO/W.PET/CERES
50 CREAM (GRAM) TOPICAL DAILY
Status: DISCONTINUED | OUTPATIENT
Start: 2017-10-21 | End: 2017-10-23 | Stop reason: HOSPADM

## 2017-10-21 RX ORDER — 0.9 % SODIUM CHLORIDE 0.9 %
75 INTRAVENOUS SOLUTION INTRAVENOUS ONCE
Status: COMPLETED | OUTPATIENT
Start: 2017-10-21 | End: 2017-10-21

## 2017-10-21 RX ORDER — GABAPENTIN 300 MG/1
300 CAPSULE ORAL 3 TIMES DAILY
Status: DISCONTINUED | OUTPATIENT
Start: 2017-10-21 | End: 2017-10-23 | Stop reason: HOSPADM

## 2017-10-21 RX ORDER — ATORVASTATIN CALCIUM 20 MG/1
1 TABLET, FILM COATED ORAL DAILY
Status: CANCELLED | OUTPATIENT
Start: 2017-10-21

## 2017-10-21 RX ORDER — ONDANSETRON HYDROCHLORIDE 8 MG/1
8 TABLET, FILM COATED ORAL EVERY 8 HOURS PRN
Status: DISCONTINUED | OUTPATIENT
Start: 2017-10-21 | End: 2017-10-23 | Stop reason: HOSPADM

## 2017-10-21 RX ADMIN — GABAPENTIN 300 MG: 300 CAPSULE ORAL at 22:08

## 2017-10-21 RX ADMIN — INSULIN LISPRO 3 UNITS: 100 INJECTION, SOLUTION INTRAVENOUS; SUBCUTANEOUS at 22:57

## 2017-10-21 RX ADMIN — MOMETASONE FUROATE AND FORMOTEROL FUMARATE DIHYDRATE 2 PUFF: 100; 5 AEROSOL RESPIRATORY (INHALATION) at 22:08

## 2017-10-21 RX ADMIN — CARVEDILOL 12.5 MG: 12.5 TABLET, FILM COATED ORAL at 14:57

## 2017-10-21 RX ADMIN — ISOSORBIDE MONONITRATE 30 MG: 30 TABLET, EXTENDED RELEASE ORAL at 14:57

## 2017-10-21 RX ADMIN — NYSTATIN 500000 UNITS: 100000 SUSPENSION ORAL at 22:08

## 2017-10-21 RX ADMIN — INSULIN LISPRO 50 UNITS: 100 INJECTION, SUSPENSION SUBCUTANEOUS at 22:57

## 2017-10-21 RX ADMIN — METRONIDAZOLE 500 MG: 500 INJECTION, SOLUTION INTRAVENOUS at 22:08

## 2017-10-21 RX ADMIN — NYSTATIN 500000 UNITS: 100000 SUSPENSION ORAL at 17:49

## 2017-10-21 RX ADMIN — GABAPENTIN 300 MG: 300 CAPSULE ORAL at 14:57

## 2017-10-21 RX ADMIN — METRONIDAZOLE 500 MG: 500 INJECTION, SOLUTION INTRAVENOUS at 15:02

## 2017-10-21 RX ADMIN — METRONIDAZOLE 500 MG: 500 INJECTION, SOLUTION INTRAVENOUS at 00:05

## 2017-10-21 RX ADMIN — CLOPIDOGREL BISULFATE 75 MG: 75 TABLET ORAL at 14:57

## 2017-10-21 RX ADMIN — ALUMINUM HYDROXIDE, MAGNESIUM HYDROXIDE, AND SIMETHICONE 30 ML: 200; 200; 20 SUSPENSION ORAL at 00:31

## 2017-10-21 RX ADMIN — SODIUM CHLORIDE 75 ML: 9 INJECTION, SOLUTION INTRAVENOUS at 01:54

## 2017-10-21 RX ADMIN — ASPIRIN 81 MG: 81 TABLET, COATED ORAL at 14:57

## 2017-10-21 RX ADMIN — PYRIDOXINE HCL TAB 50 MG 50 MG: 50 TAB at 16:45

## 2017-10-21 RX ADMIN — ENOXAPARIN SODIUM 30 MG: 30 INJECTION SUBCUTANEOUS at 10:12

## 2017-10-21 RX ADMIN — Medication 250 MG: at 00:12

## 2017-10-21 RX ADMIN — MOMETASONE FUROATE AND FORMOTEROL FUMARATE DIHYDRATE 2 PUFF: 100; 5 AEROSOL RESPIRATORY (INHALATION) at 14:56

## 2017-10-21 RX ADMIN — ATORVASTATIN CALCIUM 20 MG: 20 TABLET, FILM COATED ORAL at 14:57

## 2017-10-21 RX ADMIN — SODIUM CHLORIDE: 9 INJECTION, SOLUTION INTRAVENOUS at 14:54

## 2017-10-21 RX ADMIN — CIPROFLOXACIN 400 MG: 2 INJECTION, SOLUTION INTRAVENOUS at 16:44

## 2017-10-21 ASSESSMENT — PAIN SCALES - GENERAL
PAINLEVEL_OUTOF10: 0

## 2017-10-21 NOTE — PLAN OF CARE
Problem: Falls - Risk of  Goal: Absence of falls  Outcome: Ongoing  Patient is alert and oriented and remains free from falls this shift. Bed is locked and in lowest position. Call light is within reach and patient is using appropriately. Family is at bedside. Problem: Diarrhea:  Goal: Bowel elimination is within specified parameters  Bowel elimination is within specified parameters   Outcome: Ongoing  Patient had three episodes of diarrhea this shift. Problem: Pain:  Goal: Control of acute pain  Control of acute pain   Outcome: Ongoing  Patient denies pain this shift.

## 2017-10-21 NOTE — ED PROVIDER NOTES
Diagnosis Date    Allergic rhinitis     CAD (coronary artery disease)     s/p stents  total x5    Cholelithiasis     Chronic cough 9/14/2011    Chronic cystitis 9/14/2011    Colitis 09/2016    GERD 9/14/2011    Gout     Guillain-[place] ([facility] [address]     history of     Hyperlipidemia     Hypertension     Irritable bowel syndrome     Migraines     hx. of    OA (osteoarthritis) 9/14/2011    Osteoarthritis     Osteopenia     Pneumonia     Post-menopausal     vaginal atrophy    Pulmonary nodules     Raynaud's disease     S/P cardiac cath     x4    Sleep apnea     no machine at night.     Thyroid disease     Type 2 diabetes mellitus 9/14/2011    Type II or unspecified type diabetes mellitus without mention of complication, not stated as uncontrolled        SURGICAL HISTORY       Past Surgical History:   Procedure Laterality Date    APPENDECTOMY  07/16/12   Sherif Fortune CARDIAC CATHETERIZATION  1999, 2009, 2013, 2016    CARDIAC CATHETERIZATION  10/22/13    CARDIAC CATHETERIZATION  1/16/18    mid LAD stent    CHOLECYSTECTOMY  4/1988    COLONOSCOPY  5/5/09    CORONARY ANGIOPLASTY WITH STENT PLACEMENT  1/18/2016    stent X 1    KNEE ARTHROSCOPY Right 1993    UPPER GASTROINTESTINAL ENDOSCOPY  5/5/09       CURRENT MEDICATIONS       Current Discharge Medication List      CONTINUE these medications which have NOT CHANGED    Details   ciprofloxacin (CIPRO) 500 MG tablet Take 1 tablet by mouth 2 times daily for 5 days  Qty: 10 tablet, Refills: 0      spironolactone (ALDACTONE) 25 MG tablet Take 25 mg by mouth daily      torsemide (DEMADEX) 20 MG tablet Take 40 mg by mouth daily      Cholecalciferol (VITAMIN D3) 66668 units CAPS Take 1 capsule by mouth every 7 days      carvedilol (COREG) 12.5 MG tablet Take 12.5 mg by mouth 2 times daily      Pyridoxine HCl (VITAMIN B-6) 50 MG tablet Take 50 mg by mouth daily      b complex vitamins capsule Take 1 capsule by mouth daily OTC      insulin 70-30 (NOVOLIN 70/30) (70-30) MAGNESIUM       EMERGENCY DEPARTMENT COURSE:   Vitals:    Vitals:    10/21/17 0110 10/21/17 0130 10/21/17 0619 10/21/17 1249   BP: (!) 133/43  (!) 124/50 (!) 142/56   Pulse: 72  64 74   Resp: 18 20 18   Temp: 97.9 °F (36.6 °C)  97.9 °F (36.6 °C) 97.5 °F (36.4 °C)   TempSrc: Oral  Oral Oral   SpO2: 100%  100% 99%   Weight:  238 lb 1.6 oz (108 kg)     Height:  5' 1\" (1.549 m)         The patient was given the following medications while in the emergency department:  Orders Placed This Encounter   Medications    0.9 % sodium chloride bolus    metronidazole (FLAGYL) 500 mg in NaCl 100 mL IVPB premix    vancomycin (VANCOCIN) 50 MG/ML oral solution 250 mg    sodium chloride flush 0.9 % injection 10 mL    sodium chloride flush 0.9 % injection 10 mL    acetaminophen (TYLENOL) tablet 650 mg    enoxaparin (LOVENOX) injection 30 mg    ondansetron (ZOFRAN) injection 4 mg    0.9 % sodium chloride bolus    aluminum & magnesium hydroxide-simethicone (MAALOX) 200-200-20 MG/5ML suspension 30 mL       -------------------------    CRITICAL CARE:       CONSULTS:  None    PROCEDURES:  Procedures     FINAL IMPRESSION      1. Infectious diarrhea    2.  Acute renal failure, unspecified acute renal failure type ([facility] [address]    3. Dehydration          DISPOSITION/PLAN   DISPOSITION Admitted    PATIENT REFERRED TO:  Shari Alcazar DO  [facility] [address] [place] [address]  [address] [place] [address] 75  [address] [facility] [address]  [facility]  [phone]            DISCHARGE MEDICATIONS:  Current Discharge Medication List            Maria Esther Cameron MD  Attending Emergency Physician                      Maria Esther Cameron MD  10/21/17 5642

## 2017-10-21 NOTE — H&P
behavior. · Endocrine: negative for temperature intolerance, excessive thirst, fluid intake, or urination, tremor. · Hematologic/Lymphatic: negative for abnormal bruising or bleeding, blood clots, swollen lymph nodes. · Allergic/Immunologic: negative for nasal congestion, pruritis, hives. PHYSICAL EXAM:    BP (!) 142/56   Pulse 74   Temp 97.5 °F (36.4 °C) (Oral)   Resp 18   Ht 5' 1\" (1.549 m)   Wt 238 lb 1.6 oz (108 kg)   SpO2 99%   BMI 44.99 kg/m²      · General appearance: well nourished  · HEENT: Head: Normocephalic, no lesions, without obvious abnormality. · Lungs: clear to auscultation bilaterally  · Heart: regular rate and rhythm, S1, S2 normal, no murmur, click, rub or gallop  · Abdomen:ado tener llq  · Soft no gaurding  · Extremities: extremities normal, atraumatic, no cyanosis or edema  · Neurological: Gait normal. Reflexes normal and symmetric. Sensation grossly normal  · Skin - no rash, no lump   · Eye no icterus no redness  · Psych-normal affect   · NEURO-no limb weakness  No facial droop  · Lymphatic system-no lymphadenopathy no splenomegaly       DIAGNOSTICS:    Laboratory Testing:  CBC:   Recent Labs      10/21/17   1000   WBC  8.7   HGB  11.4*   PLT  230     BMP:    Recent Labs      10/20/17   2244  10/21/17   1000   NA  133*  135   K  4.4  4.6   CL  96*  101   CO2  21  21   BUN  47*  47*   CREATININE  2.46*  2.08*   GLUCOSE  268*  278*     S. Calcium:  Recent Labs      10/21/17   1000   CALCIUM  9.1     S. Ionized Calcium:No results for input(s): IONCA in the last 72 hours. S. Magnesium:  Recent Labs      10/20/17   2244   MG  2.0     S. Phosphorus:No results for input(s): PHOS in the last 72 hours. S. Glucose:No results for input(s): POCGLU in the last 72 hours. Glycosylated hemoglobin A1C: No results for input(s): LABA1C in the last 72 hours. INR: No results for input(s): INR in the last 72 hours.   Hepatic functions:   Recent Labs      10/20/17   2244   ALKPHOS  114*   ALT  27

## 2017-10-21 NOTE — PROGRESS NOTES
Physical Therapy    Facility/Department: [facility] MED SURG  Initial Assessment    NAME: [place]  : 1940  MRN: [id number]    Date of Service: 10/21/2017    Patient Diagnosis(es): The primary encounter diagnosis was Infectious diarrhea. Diagnoses of Acute renal failure, unspecified acute renal failure type ([facility] and Dehydration were also pertinent to this visit. has a past medical history of Allergic rhinitis; CAD (coronary artery disease); Cholelithiasis; Chronic cough; Chronic cystitis; Colitis; GERD; Gout; Guillain-[place] ([facility] [address] Hyperlipidemia; Hypertension; Irritable bowel syndrome; Migraines; OA (osteoarthritis); Osteoarthritis; Osteopenia; Pneumonia; Post-menopausal; Pulmonary nodules; Raynaud's disease; S/P cardiac cath; Sleep apnea; Thyroid disease; Type 2 diabetes mellitus; and Type II or unspecified type diabetes mellitus without mention of complication, not stated as uncontrolled. has a past surgical history that includes Knee arthroscopy (Right, 1993); Appendectomy (12); Colonoscopy (09); Upper gastrointestinal endoscopy (09); Cholecystectomy (1988); Coronary angioplasty with stent (2016); Cardiac catheterization (, , , ); Cardiac catheterization (10/22/13); and Cardiac catheterization (18).     Restrictions  Restrictions/Precautions  Restrictions/Precautions: Contact Precautions  Vision/Hearing  Vision: Within Functional Limits  Hearing: Within functional limits       General  Chart Reviewed: Yes  Patient assessed for rehabilitation services?: Yes  Response To Previous Treatment: Not applicable  Family / Caregiver Present: Yes (Spouse)  Referring Practitioner: Ayo Ronquillo MD  Referral Date : 10/21/17  Diagnosis: Acute Renal Failure   Follows Commands: Within Functional Limits  Pain Screening  Patient Currently in Pain: No  Orientation  Overall Orientation Status: Within Normal Limits  Social/Functional History  Lives With: Spouse  Type of

## 2017-10-22 LAB
ANION GAP SERPL CALCULATED.3IONS-SCNC: 9 MMOL/L (ref 9–17)
BUN BLDV-MCNC: 35 MG/DL (ref 8–23)
BUN/CREAT BLD: ABNORMAL (ref 9–20)
CALCIUM SERPL-MCNC: 8.4 MG/DL (ref 8.6–10.4)
CHLORIDE BLD-SCNC: 105 MMOL/L (ref 98–107)
CHLORIDE, UR: 49 MMOL/L
CO2: 22 MMOL/L (ref 20–31)
CREAT SERPL-MCNC: 1.2 MG/DL (ref 0.5–0.9)
CREATININE URINE: 68.1 MG/DL (ref 28–217)
GFR AFRICAN AMERICAN: 53 ML/MIN
GFR NON-AFRICAN AMERICAN: 44 ML/MIN
GFR SERPL CREATININE-BSD FRML MDRD: ABNORMAL ML/MIN/{1.73_M2}
GFR SERPL CREATININE-BSD FRML MDRD: ABNORMAL ML/MIN/{1.73_M2}
GLUCOSE BLD-MCNC: 108 MG/DL (ref 65–105)
GLUCOSE BLD-MCNC: 118 MG/DL (ref 65–105)
GLUCOSE BLD-MCNC: 158 MG/DL (ref 65–105)
GLUCOSE BLD-MCNC: 185 MG/DL (ref 65–105)
GLUCOSE BLD-MCNC: 196 MG/DL (ref 70–99)
HCT VFR BLD CALC: 30 % (ref 36–46)
HEMOGLOBIN: 9.7 G/DL (ref 12–16)
MCH RBC QN AUTO: 29.5 PG (ref 26–34)
MCHC RBC AUTO-ENTMCNC: 32.4 G/DL (ref 31–37)
MCV RBC AUTO: 91.1 FL (ref 80–100)
PDW BLD-RTO: 16.3 % (ref 11.5–14.9)
PLATELET # BLD: 182 K/UL (ref 150–450)
PMV BLD AUTO: 7.4 FL (ref 6–12)
POTASSIUM SERPL-SCNC: 4.5 MMOL/L (ref 3.7–5.3)
POTASSIUM, UR: 31.7 MMOL/L
RBC # BLD: 3.29 M/UL (ref 4–5.2)
SODIUM BLD-SCNC: 136 MMOL/L (ref 135–144)
SODIUM,UR: 31 MMOL/L
TOTAL PROTEIN, URINE: 95 MG/DL
WBC # BLD: 4.7 K/UL (ref 3.5–11)

## 2017-10-22 PROCEDURE — 84156 ASSAY OF PROTEIN URINE: CPT

## 2017-10-22 PROCEDURE — 85027 COMPLETE CBC AUTOMATED: CPT

## 2017-10-22 PROCEDURE — 80048 BASIC METABOLIC PNL TOTAL CA: CPT

## 2017-10-22 PROCEDURE — 6360000002 HC RX W HCPCS: Performed by: INTERNAL MEDICINE

## 2017-10-22 PROCEDURE — 1200000000 HC SEMI PRIVATE

## 2017-10-22 PROCEDURE — 84300 ASSAY OF URINE SODIUM: CPT

## 2017-10-22 PROCEDURE — 82570 ASSAY OF URINE CREATININE: CPT

## 2017-10-22 PROCEDURE — 2580000003 HC RX 258: Performed by: INTERNAL MEDICINE

## 2017-10-22 PROCEDURE — 82436 ASSAY OF URINE CHLORIDE: CPT

## 2017-10-22 PROCEDURE — 99232 SBSQ HOSP IP/OBS MODERATE 35: CPT | Performed by: INTERNAL MEDICINE

## 2017-10-22 PROCEDURE — 84133 ASSAY OF URINE POTASSIUM: CPT

## 2017-10-22 PROCEDURE — 2500000003 HC RX 250 WO HCPCS: Performed by: INTERNAL MEDICINE

## 2017-10-22 PROCEDURE — 6370000000 HC RX 637 (ALT 250 FOR IP): Performed by: INTERNAL MEDICINE

## 2017-10-22 PROCEDURE — 36415 COLL VENOUS BLD VENIPUNCTURE: CPT

## 2017-10-22 PROCEDURE — 82947 ASSAY GLUCOSE BLOOD QUANT: CPT

## 2017-10-22 RX ADMIN — PYRIDOXINE HCL TAB 50 MG 50 MG: 50 TAB at 08:06

## 2017-10-22 RX ADMIN — SODIUM CHLORIDE: 9 INJECTION, SOLUTION INTRAVENOUS at 17:40

## 2017-10-22 RX ADMIN — NYSTATIN 500000 UNITS: 100000 SUSPENSION ORAL at 20:48

## 2017-10-22 RX ADMIN — DEXTROSE MONOHYDRATE 12.5 G: 25 INJECTION, SOLUTION INTRAVENOUS at 23:21

## 2017-10-22 RX ADMIN — MOMETASONE FUROATE AND FORMOTEROL FUMARATE DIHYDRATE 2 PUFF: 100; 5 AEROSOL RESPIRATORY (INHALATION) at 20:47

## 2017-10-22 RX ADMIN — PANTOPRAZOLE SODIUM 20 MG: 20 TABLET, DELAYED RELEASE ORAL at 08:06

## 2017-10-22 RX ADMIN — ISOSORBIDE MONONITRATE 30 MG: 30 TABLET, EXTENDED RELEASE ORAL at 08:03

## 2017-10-22 RX ADMIN — CARVEDILOL 12.5 MG: 12.5 TABLET, FILM COATED ORAL at 08:03

## 2017-10-22 RX ADMIN — NYSTATIN 500000 UNITS: 100000 SUSPENSION ORAL at 08:03

## 2017-10-22 RX ADMIN — CIPROFLOXACIN 400 MG: 2 INJECTION, SOLUTION INTRAVENOUS at 14:30

## 2017-10-22 RX ADMIN — INSULIN LISPRO 1 UNITS: 100 INJECTION, SOLUTION INTRAVENOUS; SUBCUTANEOUS at 11:50

## 2017-10-22 RX ADMIN — ATORVASTATIN CALCIUM 20 MG: 20 TABLET, FILM COATED ORAL at 08:03

## 2017-10-22 RX ADMIN — METRONIDAZOLE 500 MG: 500 INJECTION, SOLUTION INTRAVENOUS at 04:59

## 2017-10-22 RX ADMIN — CARVEDILOL 12.5 MG: 12.5 TABLET, FILM COATED ORAL at 18:12

## 2017-10-22 RX ADMIN — METRONIDAZOLE 500 MG: 500 INJECTION, SOLUTION INTRAVENOUS at 13:24

## 2017-10-22 RX ADMIN — INSULIN LISPRO 50 UNITS: 100 INJECTION, SUSPENSION SUBCUTANEOUS at 18:14

## 2017-10-22 RX ADMIN — CLOPIDOGREL BISULFATE 75 MG: 75 TABLET ORAL at 08:03

## 2017-10-22 RX ADMIN — LEVOTHYROXINE SODIUM 50 MCG: 50 TABLET ORAL at 05:00

## 2017-10-22 RX ADMIN — ENOXAPARIN SODIUM 30 MG: 30 INJECTION SUBCUTANEOUS at 08:03

## 2017-10-22 RX ADMIN — NYSTATIN 500000 UNITS: 100000 SUSPENSION ORAL at 18:12

## 2017-10-22 RX ADMIN — GABAPENTIN 300 MG: 300 CAPSULE ORAL at 08:03

## 2017-10-22 RX ADMIN — NYSTATIN 500000 UNITS: 100000 SUSPENSION ORAL at 11:54

## 2017-10-22 RX ADMIN — INSULIN LISPRO 70 UNITS: 100 INJECTION, SUSPENSION SUBCUTANEOUS at 11:49

## 2017-10-22 RX ADMIN — INSULIN LISPRO 50 UNITS: 100 INJECTION, SUSPENSION SUBCUTANEOUS at 08:04

## 2017-10-22 RX ADMIN — MOMETASONE FUROATE AND FORMOTEROL FUMARATE DIHYDRATE 2 PUFF: 100; 5 AEROSOL RESPIRATORY (INHALATION) at 08:03

## 2017-10-22 RX ADMIN — DEXTROSE MONOHYDRATE 100 ML/HR: 50 INJECTION, SOLUTION INTRAVENOUS at 23:32

## 2017-10-22 RX ADMIN — GABAPENTIN 300 MG: 300 CAPSULE ORAL at 13:24

## 2017-10-22 RX ADMIN — METRONIDAZOLE 500 MG: 500 INJECTION, SOLUTION INTRAVENOUS at 22:08

## 2017-10-22 RX ADMIN — GABAPENTIN 300 MG: 300 CAPSULE ORAL at 20:47

## 2017-10-22 RX ADMIN — INSULIN LISPRO 1 UNITS: 100 INJECTION, SOLUTION INTRAVENOUS; SUBCUTANEOUS at 08:06

## 2017-10-22 NOTE — FLOWSHEET NOTE
10/22/17 1125   Encounter Summary   Services provided to: Patient   Referral/Consult From: [place]   Continue Visiting (10/22/17)   Volunteer Visit Yes   Complexity of Encounter Low   Length of Encounter 15 minutes   Spiritual/Worship   Type Ritual   Sacraments   Sacrament of Sick-Anointing Anointed  (Stevie Garcias 10/22/17)

## 2017-10-22 NOTE — CONSULTS
itching. :   No hematuria, no pyuria, no dysuria, no flank pain. Extremities:  No swelling or joint pains. Objective:  CURRENT TEMPERATURE:  Temp: 97.7 °F (36.5 °C)  MAXIMUM TEMPERATURE OVER 24HRS:  Temp (24hrs), Av.9 °F (36.6 °C), Min:97.7 °F (36.5 °C), Max:98.2 °F (36.8 °C)    CURRENT RESPIRATORY RATE:  Resp: 16  CURRENT PULSE:  Pulse: 64  CURRENT BLOOD PRESSURE:  BP: (!) 140/43  24HR BLOOD PRESSURE RANGE:  Systolic (24ULW), PDZ:503 , Min:131 , SPT:908   ; Diastolic (60PKX), PRATIBHA:42, Min:43, Max:56    24HR INTAKE/OUTPUT:    Intake/Output Summary (Last 24 hours) at 10/22/17 1452  Last data filed at 10/22/17 1117   Gross per 24 hour   Intake              763 ml   Output             2300 ml   Net            -1537 ml     Patient Vitals for the past 96 hrs (Last 3 readings):   Weight   10/21/17 0130 238 lb 1.6 oz (108 kg)   10/20/17 2241 240 lb (108.9 kg)         Physical Exam:  GENERAL APPEARANCE: Alert and cooperative, and appears to be in no acute distress. HEAD: normocephalic  EYES: PERRL, EOMI. Not pale, anicteric   NOSE:  No nasal discharge. THROAT:  Oral cavity and pharynx normal. Moist  NECK: Neck supple, non-tender without lymphadenopathy, masses or thyromegaly. JVD-neg  CARDIAC: Normal S1 and S2. No S3, S4 or murmurs. Rhythm is regular. LUNGS: Clear to auscultation and percussion without rales, rhonchi, wheezing or diminished breath sounds. ABD-Soft non distended, BS+ Non tender no organomegally  BACK: Examination of the spine reveals  no spinal deformity, without tenderness,   MUSKULOSKELETAL: Adequately aligned spine. No joint erythema or tenderness. EXTREMITIES: 1+ edema. Peripheral pulses intact.    NEURO:Alert oriented x 3 ,power 5/5 in all extremities      Labs:   CBC:  Recent Labs      10/20/17   2244  10/21/17   1000  10/22/17   0540   WBC  12.6*  8.7  4.7   RBC  3.93*  3.82*  3.29*   HGB  11.7*  11.4*  9.7*   HCT  36.0  35.0*  30.0*   MCV  91.7  91.7  91.1   MCH  29.7  29.7  29.5 EOSU  Urine Protein:    Lab Results   Component Value Date     03/14/2016     Urinalysis:  U/A: Lab Results   Component Value Date    NITRU NEGATIVE 09/25/2017    COLORU YELLOW 09/25/2017    PHUR 6.0 09/25/2017    WBCUA 5 TO 10 09/25/2017    RBCUA 0 TO 2 09/25/2017    MUCUS NOT REPORTED 09/25/2017    TRICHOMONAS NOT REPORTED 09/25/2017    YEAST NOT REPORTED 09/25/2017    BACTERIA MANY 09/25/2017    CLARITYU Clear 06/30/2017    SPECGRAV 1.007 09/25/2017    LEUKOCYTESUR TRACE 09/25/2017    UROBILINOGEN Normal 09/25/2017    BILIRUBINUR NEGATIVE 09/25/2017    BILIRUBINUR neg 08/16/2016    BLOODU Positive 06/30/2017    GLUCOSEU NEGATIVE 09/25/2017    KETUA NEGATIVE 09/25/2017    AMORPHOUS NOT REPORTED 09/25/2017         Radiology:  Reviewed as available. Assessment:  1. Acute kidney Injury 2nd to volume depletion related to acute diarrheal illness-improving with isotonic saline solution    2. UTI with Klebsiella    3. Acute diarrhea-C Diff negative. 4. Hyponatremia with hypovolemia    5. CKD 3 with proteinuria-possibly with renal amyloidosis-has not had a renal biopsy. Plan:  1. Decrease IVF with N/saline to 75 cc/hr  2. Hold aldactone and torsemide  3. Cont IV cipro (klebsiella sensitive)  4. free light chians. 5. urine protein  6.follow BMP electrolytes. Thank you for the consultation.       Electronically signed by Tiffany Munoz MD on 10/22/2017 at 2:52 PM

## 2017-10-22 NOTE — PROGRESS NOTES
[facility]    HISTORY AND PHYSICAL EXAMINATION            Date:   10/22/2017  Patient name:  Noe Salmeron  Date of admission:  10/20/2017  9:23 PM  MRN:   [id number]  YOB: 1940    CHIEF COMPLAINT:   abdo pain  diarreha    History Obtained From:  Patient and chart review. HISTORY OF PRESENT ILLNESS:      The patient is a 67 y.o.  female who is admitted to the hospital for  morbdi obese  Currently treated for MM  Was treted for uiti abx  cipro  admitdwith diarrhea  abod pain  Loose stool  No blood      Past Medical History:   has a past medical history of Allergic rhinitis; CAD (coronary artery disease); Cholelithiasis; Chronic cough; Chronic cystitis; Colitis; GERD; Gout; [facility] [facility] Hyperlipidemia; Hypertension; Irritable bowel syndrome; Migraines; OA (osteoarthritis); Osteoarthritis; Osteopenia; Pneumonia; Post-menopausal; Pulmonary nodules; Raynaud's disease; S/P cardiac cath; Sleep apnea; Thyroid disease; Type 2 diabetes mellitus; and Type II or unspecified type diabetes mellitus without mention of complication, not stated as uncontrolled. Past Surgical History:   has a past surgical history that includes Knee arthroscopy (Right, 1993); Appendectomy (07/16/12); Colonoscopy (5/5/09); Upper gastrointestinal endoscopy (5/5/09); Cholecystectomy (4/1988); Coronary angioplasty with stent (1/18/2016); Cardiac catheterization (1999, 2009, 2013, 2016); Cardiac catheterization (10/22/13); and Cardiac catheterization (1/16/18). Home Medications:    Prior to Admission medications    Medication Sig Start Date End Date Taking?  Authorizing Provider   ciprofloxacin (CIPRO) 500 MG tablet Take 1 tablet by mouth 2 times daily for 5 days 10/19/17 10/24/17 Yes Cindra Bullion, CNP   spironolactone (ALDACTONE) 25 MG tablet Take 25 mg by mouth daily 9/27/17  Yes Historical Provider, MD   torsemide (DEMADEX) 20 MG behavior. · Endocrine: negative for temperature intolerance, excessive thirst, fluid intake, or urination, tremor. · Hematologic/Lymphatic: negative for abnormal bruising or bleeding, blood clots, swollen lymph nodes. · Allergic/Immunologic: negative for nasal congestion, pruritis, hives. PHYSICAL EXAM:    BP (!) 140/43   Pulse 64   Temp 97.7 °F (36.5 °C) (Oral)   Resp 16   Ht 5' 1\" (1.549 m)   Wt 238 lb 1.6 oz (108 kg)   SpO2 100%   BMI 44.99 kg/m²      · General appearance: well nourished  · HEENT: Head: Normocephalic, no lesions, without obvious abnormality. · Lungs: clear to auscultation bilaterally  · Heart: regular rate and rhythm, S1, S2 normal, no murmur, click, rub or gallop  · Abdomen:ado tener llq  · Soft no gaurding  · Extremities: extremities normal, atraumatic, no cyanosis or edema  · Neurological: Gait normal. Reflexes normal and symmetric. Sensation grossly normal  · Skin - no rash, no lump   · Eye no icterus no redness  · Psych-normal affect   · NEURO-no limb weakness  No facial droop  · Lymphatic system-no lymphadenopathy no splenomegaly       DIAGNOSTICS:    Laboratory Testing:  CBC:   Recent Labs      10/22/17   0540   WBC  4.7   HGB  9.7*   PLT  182     BMP:    Recent Labs      10/20/17   2244  10/21/17   1000  10/22/17   0540   NA  133*  135  136   K  4.4  4.6  4.5   CL  96*  101  105   CO2  21  21  22   BUN  47*  47*  35*   CREATININE  2.46*  2.08*  1.20*   GLUCOSE  268*  278*  196*     S. Calcium:  Recent Labs      10/22/17   0540   CALCIUM  8.4*     S. Ionized Calcium:No results for input(s): IONCA in the last 72 hours. S. Magnesium:  Recent Labs      10/20/17   2244   MG  2.0     S. Phosphorus:No results for input(s): PHOS in the last 72 hours. S. Glucose:  Recent Labs      10/22/17   0614  10/22/17   1107  10/22/17   1620   POCGLU  158*  185*  108*     Glycosylated hemoglobin A1C: No results for input(s): LABA1C in the last 72 hours.   INR: No results for input(s): INR in

## 2017-10-22 NOTE — PLAN OF CARE
Problem: Diarrhea:  Goal: Passage of soft, formed stool  Passage of soft, formed stool   Outcome: Not Met This Shift  Had one liquid watery green stool prior to start of 3-11pm shift. Tolerating diet today without acute diarrhea following meals. Goal: Establishment of normal bowel function will improve to within specified parameters  Establishment of normal bowel function will improve to within specified parameters   Outcome: Ongoing      Problem: Pain:  Goal: Pain level will decrease  Pain level will decrease   Outcome: Met This Shift  Having less abdominal discomfort. States she is just having occasional cramping. Goal: Control of acute pain  Control of acute pain   Outcome: Met This Shift    Goal: Control of chronic pain  Control of chronic pain   Outcome: Ongoing  Does have some lower back discomfort, but is able to get self in et out of bed. No pain meds required.

## 2017-10-22 NOTE — FLOWSHEET NOTE
Patient grateful for prayer. Patient states she has a strong support system with family, friends and co-workers. 10/22/17 1000   Encounter Summary   Services provided to: Patient   Referral/Consult From: [address] [place] [address] Family members;Spouse   Continue Visiting (10/22/17)   Complexity of Encounter Low   Length of Encounter 15 minutes   Spiritual Assessment Completed Yes   Routine   Type Initial   Assessment Calm; Approachable; Hopeful;Coping   Intervention Active listening;Explored feelings, thoughts, concerns;Nurtured hope;Prayer;Sustaining presence/ Ministry of presence; Discussed illness/injury and it's impact; Discussed belief system/Denominational practices/juan alberto   Outcome Expressed gratitude;Engaged in conversation;Expressed feelings/needs/concerns; Hopeful

## 2017-10-23 VITALS
SYSTOLIC BLOOD PRESSURE: 176 MMHG | HEART RATE: 65 BPM | BODY MASS INDEX: 44.95 KG/M2 | DIASTOLIC BLOOD PRESSURE: 61 MMHG | WEIGHT: 238.1 LBS | HEIGHT: 61 IN | OXYGEN SATURATION: 100 % | TEMPERATURE: 97.2 F | RESPIRATION RATE: 18 BRPM

## 2017-10-23 LAB
ANION GAP SERPL CALCULATED.3IONS-SCNC: 9 MMOL/L (ref 9–17)
BUN BLDV-MCNC: 17 MG/DL (ref 8–23)
BUN/CREAT BLD: ABNORMAL (ref 9–20)
CALCIUM SERPL-MCNC: 8.4 MG/DL (ref 8.6–10.4)
CHLORIDE BLD-SCNC: 108 MMOL/L (ref 98–107)
CO2: 22 MMOL/L (ref 20–31)
CREAT SERPL-MCNC: 0.79 MG/DL (ref 0.5–0.9)
FREE KAPPA/LAMBDA RATIO: 1.23 (ref 0.26–1.65)
GFR AFRICAN AMERICAN: >60 ML/MIN
GFR NON-AFRICAN AMERICAN: >60 ML/MIN
GFR SERPL CREATININE-BSD FRML MDRD: ABNORMAL ML/MIN/{1.73_M2}
GFR SERPL CREATININE-BSD FRML MDRD: ABNORMAL ML/MIN/{1.73_M2}
GLUCOSE BLD-MCNC: 132 MG/DL (ref 65–105)
GLUCOSE BLD-MCNC: 168 MG/DL (ref 65–105)
GLUCOSE BLD-MCNC: 174 MG/DL (ref 65–105)
GLUCOSE BLD-MCNC: 205 MG/DL (ref 65–105)
GLUCOSE BLD-MCNC: 205 MG/DL (ref 65–105)
GLUCOSE BLD-MCNC: 218 MG/DL (ref 65–105)
GLUCOSE BLD-MCNC: 239 MG/DL (ref 65–105)
GLUCOSE BLD-MCNC: 240 MG/DL (ref 70–99)
GLUCOSE BLD-MCNC: 48 MG/DL (ref 65–105)
GLUCOSE BLD-MCNC: 56 MG/DL (ref 65–105)
GLUCOSE BLD-MCNC: 61 MG/DL (ref 65–105)
HCT VFR BLD CALC: 29.7 % (ref 36–46)
HEMOGLOBIN: 9.7 G/DL (ref 12–16)
KAPPA FREE LIGHT CHAINS QNT: 2.39 MG/DL (ref 0.37–1.94)
LAMBDA FREE LIGHT CHAINS QNT: 1.94 MG/DL (ref 0.57–2.63)
MAGNESIUM: 1.8 MG/DL (ref 1.6–2.6)
MCH RBC QN AUTO: 29.9 PG (ref 26–34)
MCHC RBC AUTO-ENTMCNC: 32.7 G/DL (ref 31–37)
MCV RBC AUTO: 91.5 FL (ref 80–100)
PDW BLD-RTO: 16.1 % (ref 11.5–14.9)
PLATELET # BLD: 156 K/UL (ref 150–450)
PMV BLD AUTO: 7.9 FL (ref 6–12)
POTASSIUM SERPL-SCNC: 4.3 MMOL/L (ref 3.7–5.3)
RBC # BLD: 3.24 M/UL (ref 4–5.2)
SODIUM BLD-SCNC: 139 MMOL/L (ref 135–144)
WBC # BLD: 4.7 K/UL (ref 3.5–11)

## 2017-10-23 PROCEDURE — 99239 HOSP IP/OBS DSCHRG MGMT >30: CPT | Performed by: INTERNAL MEDICINE

## 2017-10-23 PROCEDURE — 6360000002 HC RX W HCPCS: Performed by: INTERNAL MEDICINE

## 2017-10-23 PROCEDURE — 85027 COMPLETE CBC AUTOMATED: CPT

## 2017-10-23 PROCEDURE — 83735 ASSAY OF MAGNESIUM: CPT

## 2017-10-23 PROCEDURE — 36415 COLL VENOUS BLD VENIPUNCTURE: CPT

## 2017-10-23 PROCEDURE — 82947 ASSAY GLUCOSE BLOOD QUANT: CPT

## 2017-10-23 PROCEDURE — 2580000003 HC RX 258: Performed by: INTERNAL MEDICINE

## 2017-10-23 PROCEDURE — 6370000000 HC RX 637 (ALT 250 FOR IP): Performed by: INTERNAL MEDICINE

## 2017-10-23 PROCEDURE — 2500000003 HC RX 250 WO HCPCS: Performed by: INTERNAL MEDICINE

## 2017-10-23 PROCEDURE — 80048 BASIC METABOLIC PNL TOTAL CA: CPT

## 2017-10-23 PROCEDURE — 83883 ASSAY NEPHELOMETRY NOT SPEC: CPT

## 2017-10-23 RX ORDER — CIPROFLOXACIN 500 MG/1
250 TABLET, FILM COATED ORAL 2 TIMES DAILY
Qty: 10 TABLET | Refills: 0 | Status: SHIPPED | OUTPATIENT
Start: 2017-10-23 | End: 2017-10-28

## 2017-10-23 RX ORDER — TORSEMIDE 100 MG/1
40 TABLET ORAL DAILY
Qty: 30 TABLET | Refills: 3 | Status: SHIPPED | OUTPATIENT
Start: 2017-10-23 | End: 2017-11-03 | Stop reason: SDUPTHER

## 2017-10-23 RX ORDER — SPIRONOLACTONE 25 MG/1
25 TABLET ORAL DAILY
Qty: 30 TABLET | Refills: 3 | Status: SHIPPED | OUTPATIENT
Start: 2017-10-23 | End: 2018-06-06 | Stop reason: SDUPTHER

## 2017-10-23 RX ADMIN — ENOXAPARIN SODIUM 30 MG: 30 INJECTION SUBCUTANEOUS at 08:58

## 2017-10-23 RX ADMIN — GABAPENTIN 300 MG: 300 CAPSULE ORAL at 08:57

## 2017-10-23 RX ADMIN — METRONIDAZOLE 500 MG: 500 INJECTION, SOLUTION INTRAVENOUS at 05:38

## 2017-10-23 RX ADMIN — PYRIDOXINE HCL TAB 50 MG 50 MG: 50 TAB at 09:00

## 2017-10-23 RX ADMIN — NYSTATIN 500000 UNITS: 100000 SUSPENSION ORAL at 08:58

## 2017-10-23 RX ADMIN — INSULIN LISPRO 70 UNITS: 100 INJECTION, SUSPENSION SUBCUTANEOUS at 11:56

## 2017-10-23 RX ADMIN — LEVOTHYROXINE SODIUM 50 MCG: 50 TABLET ORAL at 05:39

## 2017-10-23 RX ADMIN — PANTOPRAZOLE SODIUM 20 MG: 20 TABLET, DELAYED RELEASE ORAL at 09:00

## 2017-10-23 RX ADMIN — ISOSORBIDE MONONITRATE 30 MG: 30 TABLET, EXTENDED RELEASE ORAL at 08:58

## 2017-10-23 RX ADMIN — CLOPIDOGREL BISULFATE 75 MG: 75 TABLET ORAL at 08:58

## 2017-10-23 RX ADMIN — ATORVASTATIN CALCIUM 20 MG: 20 TABLET, FILM COATED ORAL at 08:57

## 2017-10-23 RX ADMIN — MOMETASONE FUROATE AND FORMOTEROL FUMARATE DIHYDRATE 2 PUFF: 100; 5 AEROSOL RESPIRATORY (INHALATION) at 09:03

## 2017-10-23 RX ADMIN — SODIUM CHLORIDE: 9 INJECTION, SOLUTION INTRAVENOUS at 05:40

## 2017-10-23 RX ADMIN — INSULIN LISPRO 50 UNITS: 100 INJECTION, SUSPENSION SUBCUTANEOUS at 08:58

## 2017-10-23 RX ADMIN — ASPIRIN 81 MG: 81 TABLET, COATED ORAL at 08:58

## 2017-10-23 RX ADMIN — CARVEDILOL 12.5 MG: 12.5 TABLET, FILM COATED ORAL at 08:58

## 2017-10-23 NOTE — CARE COORDINATION
Reviewed new antibiotic(cipro) prescription for patient at discharge. Information added to discharge instructions    - reviewed possible and common side effects. Especially monitoring for diarrhea due to    antibiotic use. -reviewed directions for when to take antibiotic, and dietary restrictions. - emphasized importance of completing antibiotic therapy. - reviewed when to call physician. Reviewed discharge, follow up and home medications with patient. Patient denies questions at this time.

## 2017-10-23 NOTE — PLAN OF CARE
Problem: Falls - Risk of  Goal: Absence of falls  Outcome: Ongoing  Pt. Free of falls and injuries this shift.

## 2017-10-24 ENCOUNTER — CARE COORDINATION (OUTPATIENT)
Dept: CASE MANAGEMENT | Age: 77
End: 2017-10-24

## 2017-10-27 ENCOUNTER — CARE COORDINATION (OUTPATIENT)
Dept: CASE MANAGEMENT | Age: 77
End: 2017-10-27

## 2017-10-27 NOTE — CARE COORDINATION
[facility] Transitions Follow Up Call    10/27/2017    Patient: Loretta Day  Patient : 1940   MRN: [id number]  Reason for Admission: There are no discharge diagnoses documented for the most recent discharge. Discharge Date: 10/23/17 RARS: Risk Score: 20.75  Spoke with: Leola Vera services provided:  Scheduled appointment with Specialist-11/3/17 with dr. Casandra Flores and Final Call    Subsequent and Final Calls  Care Transitions Interventions  Other Interventions:             Follow Up  Future Appointments  Date Time Provider Valerie Recinos   2017 8:45 AM FERNANDO Marcos  [facility]   2017 11:00 AM Angélica Srinivasan MD [facility] None     Patient doing well, no needs or concerns at this time, will continue to follow//DUNG Mccarthy RN

## 2017-11-03 ENCOUNTER — CARE COORDINATION (OUTPATIENT)
Dept: CASE MANAGEMENT | Age: 77
End: 2017-11-03

## 2017-11-03 PROBLEM — E55.9 VITAMIN D DEFICIENCY: Status: ACTIVE | Noted: 2017-11-03

## 2018-01-18 ENCOUNTER — HOSPITAL ENCOUNTER (EMERGENCY)
Age: 78
Discharge: HOME | End: 2018-01-19
Attending: EMERGENCY MEDICINE
Payer: MEDICARE

## 2018-01-18 DIAGNOSIS — R73.9 HYPERGLYCEMIA: Primary | ICD-10-CM

## 2018-01-18 LAB
-: ABNORMAL
AMORPHOUS: ABNORMAL
BACTERIA: ABNORMAL
BILIRUBIN URINE: NEGATIVE
CASTS UA: ABNORMAL /LPF
COLOR: YELLOW
COMMENT UA: ABNORMAL
CRYSTALS, UA: ABNORMAL /HPF
EPITHELIAL CELLS UA: ABNORMAL /HPF
GLUCOSE BLD-MCNC: 477 MG/DL (ref 65–105)
GLUCOSE BLD-MCNC: 563 MG/DL
GLUCOSE BLD-MCNC: 563 MG/DL (ref 65–105)
GLUCOSE URINE: ABNORMAL
KETONES, URINE: NEGATIVE
LEUKOCYTE ESTERASE, URINE: NEGATIVE
MUCUS: ABNORMAL
NITRITE, URINE: NEGATIVE
OTHER OBSERVATIONS UA: ABNORMAL
PH UA: 6.5 (ref 5–8)
PROTEIN UA: ABNORMAL
RBC UA: ABNORMAL /HPF
RENAL EPITHELIAL, UA: ABNORMAL /HPF
SPECIFIC GRAVITY UA: 1.02 (ref 1–1.03)
TRICHOMONAS: ABNORMAL
TURBIDITY: CLEAR
URINE HGB: NEGATIVE
UROBILINOGEN, URINE: NORMAL
WBC UA: ABNORMAL /HPF
YEAST: ABNORMAL

## 2018-01-18 PROCEDURE — 81001 URINALYSIS AUTO W/SCOPE: CPT

## 2018-01-18 PROCEDURE — 80048 BASIC METABOLIC PNL TOTAL CA: CPT

## 2018-01-18 PROCEDURE — 82010 KETONE BODYS QUAN: CPT

## 2018-01-18 PROCEDURE — 99285 EMERGENCY DEPT VISIT HI MDM: CPT

## 2018-01-18 PROCEDURE — 6370000000 HC RX 637 (ALT 250 FOR IP): Performed by: EMERGENCY MEDICINE

## 2018-01-18 PROCEDURE — 82947 ASSAY GLUCOSE BLOOD QUANT: CPT

## 2018-01-18 PROCEDURE — 96372 THER/PROPH/DIAG INJ SC/IM: CPT

## 2018-01-18 RX ORDER — 0.9 % SODIUM CHLORIDE 0.9 %
1000 INTRAVENOUS SOLUTION INTRAVENOUS ONCE
Status: COMPLETED | OUTPATIENT
Start: 2018-01-18 | End: 2018-01-19

## 2018-01-18 RX ADMIN — INSULIN LISPRO 15 UNITS: 100 INJECTION, SOLUTION INTRAVENOUS; SUBCUTANEOUS at 22:16

## 2018-01-19 VITALS
TEMPERATURE: 98 F | HEART RATE: 83 BPM | RESPIRATION RATE: 18 BRPM | HEIGHT: 61 IN | BODY MASS INDEX: 46.26 KG/M2 | SYSTOLIC BLOOD PRESSURE: 168 MMHG | OXYGEN SATURATION: 97 % | WEIGHT: 245 LBS | DIASTOLIC BLOOD PRESSURE: 60 MMHG

## 2018-01-19 LAB
ANION GAP SERPL CALCULATED.3IONS-SCNC: 17 MMOL/L (ref 9–17)
BETA-HYDROXYBUTYRATE: 0.14 MMOL/L (ref 0.02–0.27)
BUN BLDV-MCNC: 36 MG/DL (ref 8–23)
BUN/CREAT BLD: ABNORMAL (ref 9–20)
CALCIUM SERPL-MCNC: 9.3 MG/DL (ref 8.6–10.4)
CHLORIDE BLD-SCNC: 90 MMOL/L (ref 98–107)
CO2: 23 MMOL/L (ref 20–31)
CREAT SERPL-MCNC: 1.61 MG/DL (ref 0.5–0.9)
GFR AFRICAN AMERICAN: 38 ML/MIN
GFR NON-AFRICAN AMERICAN: 31 ML/MIN
GFR SERPL CREATININE-BSD FRML MDRD: ABNORMAL ML/MIN/{1.73_M2}
GFR SERPL CREATININE-BSD FRML MDRD: ABNORMAL ML/MIN/{1.73_M2}
GLUCOSE BLD-MCNC: 452 MG/DL
GLUCOSE BLD-MCNC: 452 MG/DL (ref 65–105)
GLUCOSE BLD-MCNC: 541 MG/DL (ref 65–105)
GLUCOSE BLD-MCNC: 579 MG/DL (ref 70–99)
POTASSIUM SERPL-SCNC: 5.4 MMOL/L (ref 3.7–5.3)
SODIUM BLD-SCNC: 130 MMOL/L (ref 135–144)

## 2018-01-19 PROCEDURE — 2580000003 HC RX 258: Performed by: EMERGENCY MEDICINE

## 2018-01-19 PROCEDURE — 82947 ASSAY GLUCOSE BLOOD QUANT: CPT

## 2018-01-19 PROCEDURE — 96372 THER/PROPH/DIAG INJ SC/IM: CPT

## 2018-01-19 PROCEDURE — 6370000000 HC RX 637 (ALT 250 FOR IP): Performed by: EMERGENCY MEDICINE

## 2018-01-19 RX ADMIN — INSULIN LISPRO 6 UNITS: 100 INJECTION, SUSPENSION SUBCUTANEOUS at 02:23

## 2018-01-19 RX ADMIN — INSULIN LISPRO 20 UNITS: 100 INJECTION, SOLUTION INTRAVENOUS; SUBCUTANEOUS at 01:08

## 2018-01-19 RX ADMIN — SODIUM CHLORIDE 1000 ML: 9 INJECTION, SOLUTION INTRAVENOUS at 00:11

## 2018-01-19 ASSESSMENT — ENCOUNTER SYMPTOMS
CHOKING: 0
COUGH: 0
VOMITING: 0
SHORTNESS OF BREATH: 0
DIARRHEA: 0
NAUSEA: 0
ABDOMINAL DISTENTION: 0
SORE THROAT: 0
ABDOMINAL PAIN: 0

## 2018-01-19 NOTE — ED NOTES
Pt is taking chemo and also steroids. Her blood sugars have been elevated with these meds. She was instructed to go to the hospital for same.       Izzy Delgado RN  01/19/18 9936

## 2018-01-19 NOTE — ED PROVIDER NOTES
by the [facility]. Additional findings are as noted.     Kalpana Aguilar MD  Attending Emergency  Physician              Alexys Feliz MD  01/18/18 1284

## 2018-01-19 NOTE — ED PROVIDER NOTES
reviewed.       DIFFERENTIAL  DIAGNOSIS     PLAN (LABS / IMAGING / EKG):  Orders Placed This Encounter   Procedures    Urinalysis    Microscopic Urinalysis    Basic Metabolic Panel    Beta-Hydroxybutyrate    POC Glucose Fingerstick    POCT Glucose    POC Glucose Fingerstick    POC Glucose Fingerstick    POCT Glucose    POC Glucose Fingerstick    Insert peripheral IV       MEDICATIONS ORDERED:  Orders Placed This Encounter   Medications    insulin lispro (HUMALOG) injection vial 15 Units    0.9 % sodium chloride bolus    insulin lispro (HUMALOG) injection vial 20 Units    insulin lispro protamine & lispro (HUMALOG MIX) (75-25) 100 UNIT per ML injection vial SUSP 6 Units       DDX: Hyper glycemia, DKA, medication side effect    DIAGNOSTIC RESULTS / EMERGENCY DEPARTMENT COURSE / MDM     LABS:  Results for orders placed or performed during the hospital encounter of 01/18/18   Urinalysis   Result Value Ref Range    Color, UA YELLOW YEL    Turbidity UA CLEAR CLEAR    Glucose, Ur 3+ (A) NEG    Bilirubin Urine NEGATIVE NEG    Ketones, Urine NEGATIVE NEG    Specific Gravity, UA 1.019 1.000 - 1.030    Urine Hgb NEGATIVE NEG    pH, UA 6.5 5.0 - 8.0    Protein, UA 1+ (A) NEG    Urobilinogen, Urine Normal NORM    Nitrite, Urine NEGATIVE NEG    Leukocyte Esterase, Urine NEGATIVE NEG    Urinalysis Comments NOT REPORTED    Microscopic Urinalysis   Result Value Ref Range    -          WBC, UA 0 TO 2 /HPF    RBC, UA 0 TO 2 /HPF    Casts UA NOT REPORTED /LPF    Crystals UA NOT REPORTED NONE /HPF    Epithelial Cells UA 2 TO 5 /HPF    Renal Epithelial, Urine NOT REPORTED 0 /HPF    Bacteria, UA FEW (A) NONE    Mucus, UA NOT REPORTED NONE    Trichomonas, UA NOT REPORTED NONE    Amorphous, UA NOT REPORTED NONE    Other Observations UA NOT REPORTED NREQ    Yeast, UA NOT REPORTED NONE   Basic Metabolic Panel   Result Value Ref Range    Glucose 579 (HH) 70 - 99 mg/dL    BUN 36 (H) 8 - 23 mg/dL    CREATININE 1.61 (H) 0.50 - 0.90 with PCP in AM. Patient was told she needs to call PCP to be seen in office tomorrow. She understood and agreed. PROCEDURES:  None    CONSULTS:  None    CRITICAL CARE:  None    FINAL IMPRESSION      1. Hyperglycemia          DISPOSITION / PLAN     DISPOSITION Decision To Discharge 01/19/2018 01:58:56 AM      PATIENT REFERRED TO:  Dragan Sauer [address]  [address] [facility] [address]  [facility] [place] [address]  [phone]            DISCHARGE MEDICATIONS:  Discharge Medication List as of 1/19/2018  2:00 AM          Misael Gunn DO  Emergency Medicine Resident    (Please note that portions of this note were completed with a voice recognition program.  Efforts were made to edit the dictations but occasionally words are mis-transcribed. )       Misael Gunn DO  Resident  01/19/18 74

## 2018-01-22 ENCOUNTER — CARE COORDINATION (OUTPATIENT)
Dept: CARE COORDINATION | Age: 78
End: 2018-01-22

## 2018-01-22 NOTE — CARE COORDINATION
Attempted to reach patient for ed follow up, detailed message left with instructions to return call to care coordination at [phone]

## 2018-02-12 ENCOUNTER — OFFICE VISIT (OUTPATIENT)
Dept: PODIATRY | Age: 78
End: 2018-02-12
Payer: MEDICARE

## 2018-02-12 VITALS
HEIGHT: 61 IN | HEART RATE: 68 BPM | BODY MASS INDEX: 48.15 KG/M2 | WEIGHT: 255 LBS | RESPIRATION RATE: 16 BRPM | DIASTOLIC BLOOD PRESSURE: 86 MMHG | SYSTOLIC BLOOD PRESSURE: 132 MMHG

## 2018-02-12 DIAGNOSIS — M79.671 PAIN IN BOTH FEET: ICD-10-CM

## 2018-02-12 DIAGNOSIS — M79.672 PAIN IN BOTH FEET: ICD-10-CM

## 2018-02-12 DIAGNOSIS — I73.9 PVD (PERIPHERAL VASCULAR DISEASE) (HCC): ICD-10-CM

## 2018-02-12 DIAGNOSIS — B35.1 DERMATOPHYTOSIS OF NAIL: Primary | ICD-10-CM

## 2018-02-12 PROCEDURE — 99213 OFFICE O/P EST LOW 20 MIN: CPT | Performed by: PODIATRIST

## 2018-02-12 PROCEDURE — 4040F PNEUMOC VAC/ADMIN/RCVD: CPT | Performed by: PODIATRIST

## 2018-02-12 PROCEDURE — G8417 CALC BMI ABV UP PARAM F/U: HCPCS | Performed by: PODIATRIST

## 2018-02-12 PROCEDURE — 1036F TOBACCO NON-USER: CPT | Performed by: PODIATRIST

## 2018-02-12 PROCEDURE — 1123F ACP DISCUSS/DSCN MKR DOCD: CPT | Performed by: PODIATRIST

## 2018-02-12 PROCEDURE — G8598 ASA/ANTIPLAT THER USED: HCPCS | Performed by: PODIATRIST

## 2018-02-12 PROCEDURE — G8484 FLU IMMUNIZE NO ADMIN: HCPCS | Performed by: PODIATRIST

## 2018-02-12 PROCEDURE — 1090F PRES/ABSN URINE INCON ASSESS: CPT | Performed by: PODIATRIST

## 2018-02-12 PROCEDURE — G8399 PT W/DXA RESULTS DOCUMENT: HCPCS | Performed by: PODIATRIST

## 2018-02-12 PROCEDURE — 11721 DEBRIDE NAIL 6 OR MORE: CPT | Performed by: PODIATRIST

## 2018-02-12 PROCEDURE — G8427 DOCREV CUR MEDS BY ELIG CLIN: HCPCS | Performed by: PODIATRIST

## 2018-04-23 ENCOUNTER — OFFICE VISIT (OUTPATIENT)
Dept: PODIATRY | Age: 78
End: 2018-04-23
Payer: MEDICARE

## 2018-04-23 VITALS — HEIGHT: 61 IN | BODY MASS INDEX: 47.58 KG/M2 | WEIGHT: 252 LBS

## 2018-04-23 DIAGNOSIS — I73.9 PVD (PERIPHERAL VASCULAR DISEASE) (HCC): ICD-10-CM

## 2018-04-23 DIAGNOSIS — M79.671 PAIN IN BOTH FEET: ICD-10-CM

## 2018-04-23 DIAGNOSIS — D23.71 BENIGN NEOPLASM OF SKIN OF LOWER LIMB, INCLUDING HIP, RIGHT: ICD-10-CM

## 2018-04-23 DIAGNOSIS — M79.672 PAIN IN BOTH FEET: ICD-10-CM

## 2018-04-23 DIAGNOSIS — B35.1 DERMATOPHYTOSIS OF NAIL: Primary | ICD-10-CM

## 2018-04-23 DIAGNOSIS — E11.51 TYPE II DIABETES MELLITUS WITH PERIPHERAL CIRCULATORY DISORDER (HCC): ICD-10-CM

## 2018-04-23 DIAGNOSIS — D23.72 BENIGN NEOPLASM OF SKIN OF LOWER LIMB, INCLUDING HIP, LEFT: ICD-10-CM

## 2018-04-23 PROCEDURE — 99213 OFFICE O/P EST LOW 20 MIN: CPT | Performed by: PODIATRIST

## 2018-04-23 PROCEDURE — 11721 DEBRIDE NAIL 6 OR MORE: CPT | Performed by: PODIATRIST

## 2018-04-23 PROCEDURE — G8417 CALC BMI ABV UP PARAM F/U: HCPCS | Performed by: PODIATRIST

## 2018-04-23 PROCEDURE — G8427 DOCREV CUR MEDS BY ELIG CLIN: HCPCS | Performed by: PODIATRIST

## 2018-04-23 PROCEDURE — G8598 ASA/ANTIPLAT THER USED: HCPCS | Performed by: PODIATRIST

## 2018-04-23 PROCEDURE — 1090F PRES/ABSN URINE INCON ASSESS: CPT | Performed by: PODIATRIST

## 2018-04-23 PROCEDURE — 1036F TOBACCO NON-USER: CPT | Performed by: PODIATRIST

## 2018-04-23 PROCEDURE — G8399 PT W/DXA RESULTS DOCUMENT: HCPCS | Performed by: PODIATRIST

## 2018-04-23 PROCEDURE — 1123F ACP DISCUSS/DSCN MKR DOCD: CPT | Performed by: PODIATRIST

## 2018-04-23 PROCEDURE — 17110 DESTRUCTION B9 LES UP TO 14: CPT | Performed by: PODIATRIST

## 2018-04-23 PROCEDURE — 4040F PNEUMOC VAC/ADMIN/RCVD: CPT | Performed by: PODIATRIST

## 2018-05-03 ENCOUNTER — HOSPITAL ENCOUNTER (OUTPATIENT)
Age: 78
Discharge: HOME | End: 2018-05-03
Payer: MEDICARE

## 2018-05-03 DIAGNOSIS — R39.9 UTI SYMPTOMS: ICD-10-CM

## 2018-05-03 LAB
-: ABNORMAL
AMORPHOUS: ABNORMAL
BACTERIA: ABNORMAL
BILIRUBIN URINE: NEGATIVE
CASTS UA: ABNORMAL /LPF
COLOR: YELLOW
COMMENT UA: ABNORMAL
CRYSTALS, UA: ABNORMAL /HPF
EPITHELIAL CELLS UA: ABNORMAL /HPF
GLUCOSE URINE: ABNORMAL
KETONES, URINE: NEGATIVE
LEUKOCYTE ESTERASE, URINE: NEGATIVE
MUCUS: ABNORMAL
NITRITE, URINE: NEGATIVE
OTHER OBSERVATIONS UA: ABNORMAL
PH UA: 5.5 (ref 5–8)
PROTEIN UA: NEGATIVE
RBC UA: ABNORMAL /HPF
RENAL EPITHELIAL, UA: ABNORMAL /HPF
SPECIFIC GRAVITY UA: 1.01 (ref 1–1.03)
TRICHOMONAS: ABNORMAL
TURBIDITY: CLEAR
URINE HGB: NEGATIVE
UROBILINOGEN, URINE: NORMAL
WBC UA: ABNORMAL /HPF
YEAST: ABNORMAL

## 2018-05-03 PROCEDURE — 87086 URINE CULTURE/COLONY COUNT: CPT

## 2018-05-03 PROCEDURE — 81001 URINALYSIS AUTO W/SCOPE: CPT

## 2018-05-04 LAB
CULTURE: NORMAL
CULTURE: NORMAL
Lab: NORMAL
SPECIMEN DESCRIPTION: NORMAL
SPECIMEN DESCRIPTION: NORMAL
STATUS: NORMAL

## 2018-05-25 ENCOUNTER — HOSPITAL ENCOUNTER (OUTPATIENT)
Age: 78
Discharge: HOME | End: 2018-05-25
Payer: MEDICARE

## 2018-05-25 DIAGNOSIS — E03.9 HYPOTHYROIDISM, UNSPECIFIED TYPE: ICD-10-CM

## 2018-05-25 DIAGNOSIS — N18.30 SECONDARY DIABETES MELLITUS WITH STAGE 3 CHRONIC KIDNEY DISEASE (GFR 30-59) (HCC): ICD-10-CM

## 2018-05-25 DIAGNOSIS — N18.30 BENIGN HYPERTENSION WITH CKD (CHRONIC KIDNEY DISEASE) STAGE III (HCC): ICD-10-CM

## 2018-05-25 DIAGNOSIS — E13.22 SECONDARY DIABETES MELLITUS WITH STAGE 3 CHRONIC KIDNEY DISEASE (GFR 30-59) (HCC): ICD-10-CM

## 2018-05-25 DIAGNOSIS — E87.1 HYPONATREMIA: ICD-10-CM

## 2018-05-25 DIAGNOSIS — N18.30 CKD (CHRONIC KIDNEY DISEASE) STAGE 3, GFR 30-59 ML/MIN (HCC): ICD-10-CM

## 2018-05-25 DIAGNOSIS — E87.5 HYPERKALEMIA: ICD-10-CM

## 2018-05-25 DIAGNOSIS — E78.00 PURE HYPERCHOLESTEROLEMIA: ICD-10-CM

## 2018-05-25 DIAGNOSIS — E55.9 VITAMIN D DEFICIENCY: ICD-10-CM

## 2018-05-25 DIAGNOSIS — I12.9 BENIGN HYPERTENSION WITH CKD (CHRONIC KIDNEY DISEASE) STAGE III (HCC): ICD-10-CM

## 2018-05-25 LAB
ABSOLUTE EOS #: 0.1 K/UL (ref 0–0.4)
ABSOLUTE IMMATURE GRANULOCYTE: ABNORMAL K/UL (ref 0–0.3)
ABSOLUTE LYMPH #: 0.5 K/UL (ref 1–4.8)
ABSOLUTE MONO #: 0.6 K/UL (ref 0.1–1.3)
ALBUMIN SERPL-MCNC: 3.8 G/DL (ref 3.5–5.2)
ALBUMIN/GLOBULIN RATIO: ABNORMAL (ref 1–2.5)
ALP BLD-CCNC: 108 U/L (ref 35–104)
ALT SERPL-CCNC: 20 U/L (ref 5–33)
ANION GAP SERPL CALCULATED.3IONS-SCNC: 10 MMOL/L (ref 9–17)
ANION GAP SERPL CALCULATED.3IONS-SCNC: 9 MMOL/L (ref 9–17)
AST SERPL-CCNC: 31 U/L
BASOPHILS # BLD: 0 % (ref 0–2)
BASOPHILS ABSOLUTE: 0 K/UL (ref 0–0.2)
BILIRUB SERPL-MCNC: 0.59 MG/DL (ref 0.3–1.2)
BUN BLDV-MCNC: 33 MG/DL (ref 8–23)
BUN BLDV-MCNC: 34 MG/DL (ref 8–23)
BUN/CREAT BLD: ABNORMAL (ref 9–20)
BUN/CREAT BLD: ABNORMAL (ref 9–20)
CALCIUM SERPL-MCNC: 9.6 MG/DL (ref 8.6–10.4)
CALCIUM SERPL-MCNC: 9.8 MG/DL (ref 8.6–10.4)
CHLORIDE BLD-SCNC: 100 MMOL/L (ref 98–107)
CHLORIDE BLD-SCNC: 100 MMOL/L (ref 98–107)
CHOLESTEROL/HDL RATIO: 2.8
CHOLESTEROL: 167 MG/DL
CO2: 29 MMOL/L (ref 20–31)
CO2: 29 MMOL/L (ref 20–31)
CREAT SERPL-MCNC: 1.18 MG/DL (ref 0.5–0.9)
CREAT SERPL-MCNC: 1.18 MG/DL (ref 0.5–0.9)
DIFFERENTIAL TYPE: ABNORMAL
EOSINOPHILS RELATIVE PERCENT: 2 % (ref 0–4)
GFR AFRICAN AMERICAN: 54 ML/MIN
GFR AFRICAN AMERICAN: 54 ML/MIN
GFR NON-AFRICAN AMERICAN: 44 ML/MIN
GFR NON-AFRICAN AMERICAN: 44 ML/MIN
GFR SERPL CREATININE-BSD FRML MDRD: ABNORMAL ML/MIN/{1.73_M2}
GLUCOSE BLD-MCNC: 187 MG/DL (ref 70–99)
GLUCOSE BLD-MCNC: 187 MG/DL (ref 70–99)
HCT VFR BLD CALC: 32.8 % (ref 36–46)
HDLC SERPL-MCNC: 59 MG/DL
HEMOGLOBIN: 10.9 G/DL (ref 12–16)
IMMATURE GRANULOCYTES: ABNORMAL %
LDL CHOLESTEROL: 85 MG/DL (ref 0–130)
LYMPHOCYTES # BLD: 14 % (ref 24–44)
MAGNESIUM: 1.9 MG/DL (ref 1.6–2.6)
MCH RBC QN AUTO: 31.5 PG (ref 26–34)
MCHC RBC AUTO-ENTMCNC: 33.2 G/DL (ref 31–37)
MCV RBC AUTO: 95 FL (ref 80–100)
MONOCYTES # BLD: 15 % (ref 1–7)
NRBC AUTOMATED: ABNORMAL PER 100 WBC
PDW BLD-RTO: 18.2 % (ref 11.5–14.9)
PHOSPHORUS: 3.3 MG/DL (ref 2.6–4.5)
PLATELET # BLD: 120 K/UL (ref 150–450)
PLATELET ESTIMATE: ABNORMAL
PMV BLD AUTO: 7.7 FL (ref 6–12)
POTASSIUM SERPL-SCNC: 4.9 MMOL/L (ref 3.7–5.3)
POTASSIUM SERPL-SCNC: 4.9 MMOL/L (ref 3.7–5.3)
RBC # BLD: 3.46 M/UL (ref 4–5.2)
RBC # BLD: ABNORMAL 10*6/UL
SEG NEUTROPHILS: 69 % (ref 36–66)
SEGMENTED NEUTROPHILS ABSOLUTE COUNT: 2.6 K/UL (ref 1.3–9.1)
SODIUM BLD-SCNC: 138 MMOL/L (ref 135–144)
SODIUM BLD-SCNC: 139 MMOL/L (ref 135–144)
THYROXINE, FREE: 1.06 NG/DL (ref 0.93–1.7)
TOTAL PROTEIN: 7.1 G/DL (ref 6.4–8.3)
TRIGL SERPL-MCNC: 117 MG/DL
TSH SERPL DL<=0.05 MIU/L-ACNC: 4.03 MIU/L (ref 0.3–5)
VITAMIN D 25-HYDROXY: 26.1 NG/ML (ref 30–100)
VLDLC SERPL CALC-MCNC: NORMAL MG/DL (ref 1–30)
WBC # BLD: 3.8 K/UL (ref 3.5–11)
WBC # BLD: ABNORMAL 10*3/UL

## 2018-05-25 PROCEDURE — 84100 ASSAY OF PHOSPHORUS: CPT

## 2018-05-25 PROCEDURE — 80053 COMPREHEN METABOLIC PANEL: CPT

## 2018-05-25 PROCEDURE — 84443 ASSAY THYROID STIM HORMONE: CPT

## 2018-05-25 PROCEDURE — 80048 BASIC METABOLIC PNL TOTAL CA: CPT

## 2018-05-25 PROCEDURE — 85025 COMPLETE CBC W/AUTO DIFF WBC: CPT

## 2018-05-25 PROCEDURE — 36415 COLL VENOUS BLD VENIPUNCTURE: CPT

## 2018-05-25 PROCEDURE — 84439 ASSAY OF FREE THYROXINE: CPT

## 2018-05-25 PROCEDURE — 83735 ASSAY OF MAGNESIUM: CPT

## 2018-05-25 PROCEDURE — 82306 VITAMIN D 25 HYDROXY: CPT

## 2018-05-25 PROCEDURE — 80061 LIPID PANEL: CPT

## 2018-06-05 PROBLEM — D64.9 ANEMIA: Status: ACTIVE | Noted: 2018-06-05

## 2018-06-06 PROBLEM — M79.662 PAIN OF LEFT LOWER LEG: Status: ACTIVE | Noted: 2018-06-06

## 2018-06-13 ENCOUNTER — OFFICE VISIT (OUTPATIENT)
Dept: PODIATRY | Age: 78
End: 2018-06-13
Payer: MEDICARE

## 2018-06-13 VITALS
RESPIRATION RATE: 19 BRPM | DIASTOLIC BLOOD PRESSURE: 70 MMHG | HEIGHT: 61 IN | SYSTOLIC BLOOD PRESSURE: 155 MMHG | HEART RATE: 78 BPM | WEIGHT: 252 LBS | BODY MASS INDEX: 47.58 KG/M2

## 2018-06-13 DIAGNOSIS — M79.674 PAIN AROUND TOENAIL, RIGHT FOOT: ICD-10-CM

## 2018-06-13 DIAGNOSIS — L89.611 DECUBITUS ULCER OF RIGHT HEEL, STAGE 1: Primary | ICD-10-CM

## 2018-06-13 DIAGNOSIS — E11.51 TYPE II DIABETES MELLITUS WITH PERIPHERAL CIRCULATORY DISORDER (HCC): ICD-10-CM

## 2018-06-13 PROBLEM — I50.32 CHRONIC DIASTOLIC CHF (CONGESTIVE HEART FAILURE) (HCC): Status: ACTIVE | Noted: 2017-03-19

## 2018-06-13 PROBLEM — I42.8 CARDIOMYOPATHY, NONISCHEMIC (HCC): Status: ACTIVE | Noted: 2017-05-03

## 2018-06-13 PROBLEM — E85.4 CARDIAC AMYLOIDOSIS (HCC): Status: ACTIVE | Noted: 2017-07-20

## 2018-06-13 PROBLEM — I15.9 SECONDARY HYPERTENSION: Status: ACTIVE | Noted: 2017-03-19

## 2018-06-13 PROBLEM — I43 CARDIAC AMYLOIDOSIS (HCC): Status: ACTIVE | Noted: 2017-07-20

## 2018-06-13 PROBLEM — Z00.6 EXAMINATION OF PARTICIPANT IN CLINICAL TRIAL: Status: ACTIVE | Noted: 2017-05-03

## 2018-06-13 PROBLEM — E85.4 ORGAN-LIMITED AMYLOIDOSIS (HCC): Status: ACTIVE | Noted: 2017-05-03

## 2018-06-13 PROBLEM — R10.32 LLQ PAIN: Status: ACTIVE | Noted: 2017-08-21

## 2018-06-13 PROCEDURE — 1036F TOBACCO NON-USER: CPT | Performed by: PODIATRIST

## 2018-06-13 PROCEDURE — 1123F ACP DISCUSS/DSCN MKR DOCD: CPT | Performed by: PODIATRIST

## 2018-06-13 PROCEDURE — G8598 ASA/ANTIPLAT THER USED: HCPCS | Performed by: PODIATRIST

## 2018-06-13 PROCEDURE — G8399 PT W/DXA RESULTS DOCUMENT: HCPCS | Performed by: PODIATRIST

## 2018-06-13 PROCEDURE — G8427 DOCREV CUR MEDS BY ELIG CLIN: HCPCS | Performed by: PODIATRIST

## 2018-06-13 PROCEDURE — 4040F PNEUMOC VAC/ADMIN/RCVD: CPT | Performed by: PODIATRIST

## 2018-06-13 PROCEDURE — 99213 OFFICE O/P EST LOW 20 MIN: CPT | Performed by: PODIATRIST

## 2018-06-13 PROCEDURE — A5513 MULTI DEN INSERT CUSTOM MOLD: HCPCS | Performed by: PODIATRIST

## 2018-06-13 PROCEDURE — 1090F PRES/ABSN URINE INCON ASSESS: CPT | Performed by: PODIATRIST

## 2018-06-13 PROCEDURE — A5500 DIAB SHOE FOR DENSITY INSERT: HCPCS | Performed by: PODIATRIST

## 2018-06-13 PROCEDURE — G8417 CALC BMI ABV UP PARAM F/U: HCPCS | Performed by: PODIATRIST

## 2018-07-23 ENCOUNTER — OFFICE VISIT (OUTPATIENT)
Dept: PODIATRY | Age: 78
End: 2018-07-23
Payer: MEDICARE

## 2018-07-23 VITALS
HEART RATE: 82 BPM | BODY MASS INDEX: 47.58 KG/M2 | HEIGHT: 61 IN | DIASTOLIC BLOOD PRESSURE: 80 MMHG | SYSTOLIC BLOOD PRESSURE: 136 MMHG | WEIGHT: 252 LBS | RESPIRATION RATE: 18 BRPM

## 2018-07-23 DIAGNOSIS — M79.672 PAIN IN BOTH FEET: ICD-10-CM

## 2018-07-23 DIAGNOSIS — M79.671 PAIN IN BOTH FEET: ICD-10-CM

## 2018-07-23 DIAGNOSIS — B35.1 DERMATOPHYTOSIS OF NAIL: ICD-10-CM

## 2018-07-23 DIAGNOSIS — E11.51 TYPE II DIABETES MELLITUS WITH PERIPHERAL CIRCULATORY DISORDER (HCC): Primary | ICD-10-CM

## 2018-07-23 DIAGNOSIS — I73.9 PVD (PERIPHERAL VASCULAR DISEASE) (HCC): ICD-10-CM

## 2018-07-23 PROCEDURE — 1123F ACP DISCUSS/DSCN MKR DOCD: CPT | Performed by: PODIATRIST

## 2018-07-23 PROCEDURE — 99213 OFFICE O/P EST LOW 20 MIN: CPT | Performed by: PODIATRIST

## 2018-07-23 PROCEDURE — 1036F TOBACCO NON-USER: CPT | Performed by: PODIATRIST

## 2018-07-23 PROCEDURE — G8399 PT W/DXA RESULTS DOCUMENT: HCPCS | Performed by: PODIATRIST

## 2018-07-23 PROCEDURE — G8598 ASA/ANTIPLAT THER USED: HCPCS | Performed by: PODIATRIST

## 2018-07-23 PROCEDURE — 1090F PRES/ABSN URINE INCON ASSESS: CPT | Performed by: PODIATRIST

## 2018-07-23 PROCEDURE — 11721 DEBRIDE NAIL 6 OR MORE: CPT | Performed by: PODIATRIST

## 2018-07-23 PROCEDURE — 4040F PNEUMOC VAC/ADMIN/RCVD: CPT | Performed by: PODIATRIST

## 2018-07-23 PROCEDURE — G8417 CALC BMI ABV UP PARAM F/U: HCPCS | Performed by: PODIATRIST

## 2018-07-23 PROCEDURE — G8427 DOCREV CUR MEDS BY ELIG CLIN: HCPCS | Performed by: PODIATRIST

## 2018-07-23 PROCEDURE — 1101F PT FALLS ASSESS-DOCD LE1/YR: CPT | Performed by: PODIATRIST

## 2018-07-23 NOTE — PROGRESS NOTES
[address] [facility] [address] [facility]  [facility] [address]  Dept: [phone]    DIABETIC NAIL PROGRESS NOTE  Date of patient's visit: 7/23/2018  Patient's Name:  Royce Jose YOB: 1940            Patient Care Team:  PHILIP Scott CNP as PCP - General (Nurse Practitioner)  PHILIP Scott CNP as PCP - S Attributed Provider  Marvin Noble MD as Consulting Physician (Cardiology)  Nadia Kee as Consulting Physician (Endocrinology)  Ayo Quezada MD as Consulting Physician (Urology)  Christopher Babinski, MD as Consulting Physician (Internal Medicine)  Payton Curry MD as Consulting Physician (General Surgery)          Chief Complaint   Patient presents with    Diabetes    Nail Problem    Circulatory Problem       Subjective: Royce Jose comes to clinic for Diabetes; Nail Problem; and Circulatory Problem    she is a diabetic and states that she is doing better with health. Pt currently has complaint of thickened, elongated nails that they cannot manage by themselves. Pt's primary care physician is PHILIP Scott CNP   Pt's last blood sugar was 164 . Lab Results   Component Value Date    LABA1C 8.2 03/01/2017      Complains of numbness in the feet bilat. Past Medical History:   Diagnosis Date    Allergic rhinitis     CAD (coronary artery disease)     s/p stents  total x5    Cholelithiasis     Chronic cough 9/14/2011    Chronic cystitis 9/14/2011    Colitis 09/2016    GERD 9/14/2011    Gout     Guillain-[place] ([facility] [address]     history of     Hyperlipidemia     Hypertension     Irritable bowel syndrome     Migraines     hx. of    OA (osteoarthritis) 9/14/2011    Osteoarthritis     Osteopenia     Pneumonia     Post-menopausal     vaginal atrophy    Pulmonary nodules     Raynaud's disease     S/P cardiac cath     x4    Sleep apnea     no machine at night.     Thyroid disease     Type 2 diabetes mellitus 9/14/2011    Type II or unspecified type diabetes mellitus without mention of complication, not stated as uncontrolled        Allergies   Allergen Reactions    Hepatitis B Virus Vaccine Other (See Comments)     [place] (toxic reaction)    Norvasc [Amlodipine Besylate]      Swelling. COMMON SIDE EFFECT      Tramadol      Other reaction(s):  Intolerance  Seizures felt to be related    Fentanyl Nausea And Vomiting    Percocet [Oxycodone-Acetaminophen] Nausea And Vomiting    Velcade [Bortezomib] Diarrhea and Rash     Current Outpatient Prescriptions on File Prior to Visit   Medication Sig Dispense Refill    spironolactone (ALDACTONE) 25 MG tablet TAKE 1 TABLET BY MOUTH ONE TIME A DAY  30 tablet 2    magnesium oxide (MAG-OX) 400 (241.3 Mg) MG TABS tablet TAKE 1 TABLET BY MOUTH ONE TIME A DAY  30 tablet 1    bortezomib (VELCADE) 3.5 MG chemo innjection Sig: once a week injection 2.9 mg 0    vitamin D (ERGOCALCIFEROL) 26954 units CAPS capsule TAKE 1 CAPSULE BY MOUTH ONE TIME A WEEK  12 capsule 0    carvedilol (COREG) 12.5 MG tablet Take 1 tablet by mouth 2 times daily 60 tablet 4    insulin aspart (NOVOLOG) 100 UNIT/ML injection vial Inject into the skin 3 times daily (before meals)      torsemide (DEMADEX) 20 MG tablet Take 1 tablet by mouth daily 30 tablet 6    b complex vitamins capsule Take 1 capsule by mouth daily OTC      insulin 70-30 (NOVOLIN 70/30) (70-30) 100 UNIT per ML injection vial Inject into the skin 2 times daily       ondansetron (ZOFRAN) 8 MG tablet Take 8 mg by mouth every 8 hours as needed for Nausea or Vomiting      acyclovir (ZOVIRAX) 200 MG capsule Take 200 mg by mouth 2 times daily       allopurinol (ZYLOPRIM) 300 MG tablet Take 1 tablet by mouth daily 30 tablet 11    atorvastatin (LIPITOR) 20 MG tablet TAKE 1 TABLET BY MOUTH ONE TIME A DAY  30 tablet 11    albuterol sulfate HFA (PROVENTIL HFA) 108 (90 BASE) MCG/ACT inhaler Inhale 2 puffs into the lungs every 6 hours as needed for Wheezing 1 Inhaler 3    fluticasone-salmeterol (ADVAIR DISKUS) 100-50 MCG/DOSE diskus inhaler INHALE ONE PUFF BY MOUTH EVERY 12 HOURS 60 each 3    isosorbide mononitrate (IMDUR) 30 MG CR tablet Take 30 mg by mouth daily      pantoprazole (PROTONIX) 20 MG tablet TAKE ONE TABLET BY MOUTH DAILY 30 tablet 10    gabapentin (NEURONTIN) 300 MG capsule Take 300 mg by mouth 3 times daily      clopidogrel (PLAVIX) 75 MG tablet Take 75 mg by mouth daily      nitroGLYCERIN (NITROSTAT) 0.4 MG SL tablet Place 1 tablet under the tongue every 5 minutes as needed for Chest pain 25 tablet 3    levothyroxine (SYNTHROID) 50 MCG tablet TAKE ONE TABLET BY MOUTH EVERY DAY 90 tablet 3    Biotin 5000 MCG TABS Take  by mouth daily.  aspirin 81 MG EC tablet Take 81 mg by mouth daily. No current facility-administered medications on file prior to visit. Review of Systems  Review of Systems - History obtained from chart review and the patient  General ROS: negative for - chills, fatigue, fever, night sweats or weight gain  Constitutional: Negative for chills, diaphoresis, fatigue, fever and unexpected weight change. Musculoskeletal: Positive for arthralgias, gait problem and joint swelling. Neurological ROS: negative for - behavioral changes, confusion, headaches or seizures. Negative for weakness and numbness. Dermatological ROS: negative for - mole changes, rash  Cardiovascular: Negative for leg swelling. Gastrointestinal: Negative for constipation, diarrhea, nausea and vomiting. Objective:  General: AAO x 3 in NAD.     Derm  Toenail Description  Sites of Onychomycosis Involvement (Check affected area)  [x] [x] [x] [x] [x] [x] [x] [x] [x] [x]  5 4 3 2 1 1 2 3 4 5                          Right                                        Left    Thickness  [x] [x] [x] [x] [x] [x] [x] [x] [x] [x]  5 4 3 2 1 1 2 3 4 5                         Right Left    Dystrophic Changes   [x] [x] [x] [x] [x] [x] [x] [x] [x] [x]  5 4 3 2 1 1 2 3 4 5                         Right                                        Left    Color   [x] [x] [x] [x] [x] [x] [x] [x] [x] [x]  5 4 3 2 1 1 2 3 4 5                          Right                                        Left    Incurvation/Ingrowin   [] [] [] [] [] [] [] [] [] []  5 4 3 2 1 1 2 3 4 5                         Right                                        Left    Inflammation/Pain   [x] [x] [x] [x] [x] [x] [x] [x] [x] [x]  5 4 3 2 1 1 2 3 4 5                         Right                                        Left      Dermatologic Exam:  Skin lesion/ulceration Absent . Skin No rashes or nodules noted. .       Musculoskeletal:     1st MPJ ROM decreased, Bilateral.  Muscle strength 5/5, Bilateral.  Pain present upon palpation of toenails 1-5, Bilateral. decreased medial longitudinal arch, Bilateral.  Ankle ROM decreased,Bilateral.    Dorsally contracted digits present digits 2, Bilateral.     Vascular: DP and PT pulses palpable 1/4, Bilateral.  CFT <5 seconds, Bilateral.  Hair growth absent to the level of the digits, Bilateral.  Edema present, Bilateral.  Varicosities absent, Bilateral. Erythema absent, Bilateral    Neurological: Sensation diminshed to light touch to level of digits, Bilateral.  Protective sensation intact 6/10 sites via 5.07/10g [place] Monofilament, Bilateral.  negative Tinel's, Bilateral.  negative Valleix sign, Bilateral.      Integument: Warm, dry, supple, Bilateral.  Open lesion absent, Bilateral.  Interdigital maceration absent to web spaces 4, Bilateral.  Nails 1-5 left and 1-5 right thickened > 3.0 mm, dystrophic and crumbly, discolored with subungual debris.   Fissures absent, Bilateral.     Visual inspection:  Deformity: hammertoe deformity sharda feet  amputation: absent  Skin lesions: absent  Edema: right- 2+ pitting edema, left- 2+ pitting edema    Sensory exam:  Monofilament

## 2018-08-22 ENCOUNTER — APPOINTMENT (OUTPATIENT)
Dept: GENERAL RADIOLOGY | Age: 78
End: 2018-08-22
Payer: MEDICARE

## 2018-08-22 ENCOUNTER — HOSPITAL ENCOUNTER (OUTPATIENT)
Age: 78
Setting detail: OBSERVATION
Discharge: HOME | End: 2018-08-23
Attending: EMERGENCY MEDICINE | Admitting: FAMILY MEDICINE
Payer: MEDICARE

## 2018-08-22 DIAGNOSIS — R00.2 PALPITATIONS: ICD-10-CM

## 2018-08-22 DIAGNOSIS — R07.9 CHEST PAIN, UNSPECIFIED TYPE: Primary | ICD-10-CM

## 2018-08-22 DIAGNOSIS — R73.9 HYPERGLYCEMIA: ICD-10-CM

## 2018-08-22 LAB
ABSOLUTE EOS #: 0.09 K/UL (ref 0–0.4)
ABSOLUTE IMMATURE GRANULOCYTE: ABNORMAL K/UL (ref 0–0.3)
ABSOLUTE LYMPH #: 0.56 K/UL (ref 1–4.8)
ABSOLUTE MONO #: 0.5 K/UL (ref 0.1–1.3)
ANION GAP SERPL CALCULATED.3IONS-SCNC: 14 MMOL/L (ref 9–17)
BASOPHILS # BLD: 0 % (ref 0–2)
BASOPHILS ABSOLUTE: 0 K/UL (ref 0–0.2)
BNP INTERPRETATION: NORMAL
BUN BLDV-MCNC: 21 MG/DL (ref 8–23)
BUN/CREAT BLD: ABNORMAL (ref 9–20)
CALCIUM SERPL-MCNC: 9.3 MG/DL (ref 8.6–10.4)
CHLORIDE BLD-SCNC: 93 MMOL/L (ref 98–107)
CO2: 27 MMOL/L (ref 20–31)
CREAT SERPL-MCNC: 1.39 MG/DL (ref 0.5–0.9)
D-DIMER QUANTITATIVE: 0.52 MG/L FEU
DIFFERENTIAL TYPE: ABNORMAL
EKG ATRIAL RATE: 92 BPM
EKG P AXIS: 57 DEGREES
EKG P-R INTERVAL: 182 MS
EKG Q-T INTERVAL: 374 MS
EKG QRS DURATION: 84 MS
EKG QTC CALCULATION (BAZETT): 462 MS
EKG R AXIS: 33 DEGREES
EKG T AXIS: 16 DEGREES
EKG VENTRICULAR RATE: 92 BPM
EOSINOPHILS RELATIVE PERCENT: 3 % (ref 0–4)
GFR AFRICAN AMERICAN: 44 ML/MIN
GFR NON-AFRICAN AMERICAN: 37 ML/MIN
GFR SERPL CREATININE-BSD FRML MDRD: ABNORMAL ML/MIN/{1.73_M2}
GFR SERPL CREATININE-BSD FRML MDRD: ABNORMAL ML/MIN/{1.73_M2}
GLUCOSE BLD-MCNC: 427 MG/DL (ref 65–105)
GLUCOSE BLD-MCNC: 456 MG/DL (ref 70–99)
HCT VFR BLD CALC: 33 % (ref 36–46)
HEMOGLOBIN: 11 G/DL (ref 12–16)
IMMATURE GRANULOCYTES: ABNORMAL %
LYMPHOCYTES # BLD: 18 % (ref 24–44)
MCH RBC QN AUTO: 32.8 PG (ref 26–34)
MCHC RBC AUTO-ENTMCNC: 33.2 G/DL (ref 31–37)
MCV RBC AUTO: 98.8 FL (ref 80–100)
MONOCYTES # BLD: 16 % (ref 1–7)
MORPHOLOGY: ABNORMAL
NRBC AUTOMATED: ABNORMAL PER 100 WBC
PDW BLD-RTO: 17.4 % (ref 11.5–14.9)
PLATELET # BLD: 130 K/UL (ref 150–450)
PLATELET ESTIMATE: ABNORMAL
PMV BLD AUTO: 8.4 FL (ref 6–12)
POTASSIUM SERPL-SCNC: 4.3 MMOL/L (ref 3.7–5.3)
PRO-BNP: 273 PG/ML
RBC # BLD: 3.34 M/UL (ref 4–5.2)
RBC # BLD: ABNORMAL 10*6/UL
SEG NEUTROPHILS: 63 % (ref 36–66)
SEGMENTED NEUTROPHILS ABSOLUTE COUNT: 1.95 K/UL (ref 1.3–9.1)
SODIUM BLD-SCNC: 134 MMOL/L (ref 135–144)
TROPONIN INTERP: NORMAL
TROPONIN INTERP: NORMAL
TROPONIN T: <0.03 NG/ML
TROPONIN T: <0.03 NG/ML
WBC # BLD: 3.1 K/UL (ref 3.5–11)
WBC # BLD: ABNORMAL 10*3/UL

## 2018-08-22 PROCEDURE — 80048 BASIC METABOLIC PNL TOTAL CA: CPT

## 2018-08-22 PROCEDURE — 82947 ASSAY GLUCOSE BLOOD QUANT: CPT

## 2018-08-22 PROCEDURE — 85025 COMPLETE CBC W/AUTO DIFF WBC: CPT

## 2018-08-22 PROCEDURE — G0378 HOSPITAL OBSERVATION PER HR: HCPCS

## 2018-08-22 PROCEDURE — 84484 ASSAY OF TROPONIN QUANT: CPT

## 2018-08-22 PROCEDURE — 6360000002 HC RX W HCPCS: Performed by: STUDENT IN AN ORGANIZED HEALTH CARE EDUCATION/TRAINING PROGRAM

## 2018-08-22 PROCEDURE — 2580000003 HC RX 258: Performed by: STUDENT IN AN ORGANIZED HEALTH CARE EDUCATION/TRAINING PROGRAM

## 2018-08-22 PROCEDURE — 6370000000 HC RX 637 (ALT 250 FOR IP): Performed by: STUDENT IN AN ORGANIZED HEALTH CARE EDUCATION/TRAINING PROGRAM

## 2018-08-22 PROCEDURE — 83880 ASSAY OF NATRIURETIC PEPTIDE: CPT

## 2018-08-22 PROCEDURE — 99285 EMERGENCY DEPT VISIT HI MDM: CPT

## 2018-08-22 PROCEDURE — 36415 COLL VENOUS BLD VENIPUNCTURE: CPT

## 2018-08-22 PROCEDURE — 85379 FIBRIN DEGRADATION QUANT: CPT

## 2018-08-22 PROCEDURE — 96372 THER/PROPH/DIAG INJ SC/IM: CPT

## 2018-08-22 PROCEDURE — 96374 THER/PROPH/DIAG INJ IV PUSH: CPT

## 2018-08-22 PROCEDURE — 71046 X-RAY EXAM CHEST 2 VIEWS: CPT

## 2018-08-22 RX ORDER — GABAPENTIN 300 MG/1
300 CAPSULE ORAL 3 TIMES DAILY
Status: DISCONTINUED | OUTPATIENT
Start: 2018-08-23 | End: 2018-08-23 | Stop reason: HOSPADM

## 2018-08-22 RX ORDER — VITAMIN C
1 TAB ORAL DAILY
Status: DISCONTINUED | OUTPATIENT
Start: 2018-08-23 | End: 2018-08-23 | Stop reason: HOSPADM

## 2018-08-22 RX ORDER — ACYCLOVIR 200 MG/1
200 CAPSULE ORAL 2 TIMES DAILY
Status: DISCONTINUED | OUTPATIENT
Start: 2018-08-23 | End: 2018-08-23 | Stop reason: HOSPADM

## 2018-08-22 RX ORDER — ACETAMINOPHEN 325 MG/1
650 TABLET ORAL EVERY 4 HOURS PRN
Status: DISCONTINUED | OUTPATIENT
Start: 2018-08-22 | End: 2018-08-23 | Stop reason: HOSPADM

## 2018-08-22 RX ORDER — NITROGLYCERIN 0.4 MG/1
0.4 TABLET SUBLINGUAL EVERY 5 MIN PRN
Status: DISCONTINUED | OUTPATIENT
Start: 2018-08-22 | End: 2018-08-23 | Stop reason: HOSPADM

## 2018-08-22 RX ORDER — SODIUM CHLORIDE 0.9 % (FLUSH) 0.9 %
10 SYRINGE (ML) INJECTION PRN
Status: DISCONTINUED | OUTPATIENT
Start: 2018-08-22 | End: 2018-08-23 | Stop reason: HOSPADM

## 2018-08-22 RX ORDER — ISOSORBIDE MONONITRATE 30 MG/1
30 TABLET, EXTENDED RELEASE ORAL DAILY
Status: DISCONTINUED | OUTPATIENT
Start: 2018-08-23 | End: 2018-08-23 | Stop reason: HOSPADM

## 2018-08-22 RX ORDER — LEVOTHYROXINE SODIUM 0.07 MG/1
75 TABLET ORAL DAILY
Status: DISCONTINUED | OUTPATIENT
Start: 2018-08-23 | End: 2018-08-23 | Stop reason: HOSPADM

## 2018-08-22 RX ORDER — SODIUM CHLORIDE 9 MG/ML
INJECTION, SOLUTION INTRAVENOUS CONTINUOUS
Status: DISCONTINUED | OUTPATIENT
Start: 2018-08-23 | End: 2018-08-23 | Stop reason: HOSPADM

## 2018-08-22 RX ORDER — 0.9 % SODIUM CHLORIDE 0.9 %
500 INTRAVENOUS SOLUTION INTRAVENOUS ONCE
Status: COMPLETED | OUTPATIENT
Start: 2018-08-22 | End: 2018-08-23

## 2018-08-22 RX ORDER — ASPIRIN 81 MG/1
81 TABLET ORAL DAILY
Status: DISCONTINUED | OUTPATIENT
Start: 2018-08-23 | End: 2018-08-23 | Stop reason: HOSPADM

## 2018-08-22 RX ORDER — TORSEMIDE 20 MG/1
20 TABLET ORAL DAILY
Status: DISCONTINUED | OUTPATIENT
Start: 2018-08-23 | End: 2018-08-23 | Stop reason: HOSPADM

## 2018-08-22 RX ORDER — SODIUM CHLORIDE 0.9 % (FLUSH) 0.9 %
10 SYRINGE (ML) INJECTION EVERY 12 HOURS SCHEDULED
Status: DISCONTINUED | OUTPATIENT
Start: 2018-08-23 | End: 2018-08-23 | Stop reason: HOSPADM

## 2018-08-22 RX ORDER — CARVEDILOL 12.5 MG/1
12.5 TABLET ORAL 2 TIMES DAILY
Status: DISCONTINUED | OUTPATIENT
Start: 2018-08-23 | End: 2018-08-23 | Stop reason: HOSPADM

## 2018-08-22 RX ORDER — ONDANSETRON 4 MG/1
8 TABLET, FILM COATED ORAL EVERY 8 HOURS PRN
Status: DISCONTINUED | OUTPATIENT
Start: 2018-08-22 | End: 2018-08-23 | Stop reason: HOSPADM

## 2018-08-22 RX ORDER — SPIRONOLACTONE 25 MG/1
25 TABLET ORAL DAILY
Status: DISCONTINUED | OUTPATIENT
Start: 2018-08-23 | End: 2018-08-23 | Stop reason: HOSPADM

## 2018-08-22 RX ORDER — ERGOCALCIFEROL 1.25 MG/1
50000 CAPSULE ORAL WEEKLY
Status: DISCONTINUED | OUTPATIENT
Start: 2018-08-23 | End: 2018-08-23 | Stop reason: HOSPADM

## 2018-08-22 RX ORDER — PANTOPRAZOLE SODIUM 20 MG/1
20 TABLET, DELAYED RELEASE ORAL
Status: DISCONTINUED | OUTPATIENT
Start: 2018-08-23 | End: 2018-08-23 | Stop reason: HOSPADM

## 2018-08-22 RX ORDER — CLOPIDOGREL BISULFATE 75 MG/1
75 TABLET ORAL DAILY
Status: DISCONTINUED | OUTPATIENT
Start: 2018-08-23 | End: 2018-08-23 | Stop reason: HOSPADM

## 2018-08-22 RX ORDER — ALBUTEROL SULFATE 90 UG/1
2 AEROSOL, METERED RESPIRATORY (INHALATION) EVERY 6 HOURS PRN
Status: DISCONTINUED | OUTPATIENT
Start: 2018-08-22 | End: 2018-08-23 | Stop reason: HOSPADM

## 2018-08-22 RX ORDER — ALLOPURINOL 300 MG/1
300 TABLET ORAL DAILY
Status: DISCONTINUED | OUTPATIENT
Start: 2018-08-23 | End: 2018-08-23 | Stop reason: HOSPADM

## 2018-08-22 RX ORDER — ASPIRIN 81 MG/1
324 TABLET, CHEWABLE ORAL ONCE
Status: COMPLETED | OUTPATIENT
Start: 2018-08-22 | End: 2018-08-22

## 2018-08-22 RX ORDER — ATORVASTATIN CALCIUM 20 MG/1
20 TABLET, FILM COATED ORAL NIGHTLY
Status: DISCONTINUED | OUTPATIENT
Start: 2018-08-23 | End: 2018-08-23 | Stop reason: HOSPADM

## 2018-08-22 RX ADMIN — INSULIN HUMAN 10 UNITS: 100 INJECTION, SOLUTION PARENTERAL at 21:48

## 2018-08-22 RX ADMIN — SODIUM CHLORIDE 500 ML: 9 INJECTION, SOLUTION INTRAVENOUS at 21:46

## 2018-08-22 RX ADMIN — ENOXAPARIN SODIUM 120 MG: 120 INJECTION SUBCUTANEOUS at 23:00

## 2018-08-22 RX ADMIN — ASPIRIN 81 MG 324 MG: 81 TABLET ORAL at 20:40

## 2018-08-22 RX ADMIN — INSULIN HUMAN 5 UNITS: 100 INJECTION, SOLUTION PARENTERAL at 23:17

## 2018-08-22 ASSESSMENT — PAIN SCALES - GENERAL
PAINLEVEL_OUTOF10: 6
PAINLEVEL_OUTOF10: 0
PAINLEVEL_OUTOF10: 0

## 2018-08-22 ASSESSMENT — HEART SCORE: ECG: 1

## 2018-08-23 ENCOUNTER — APPOINTMENT (OUTPATIENT)
Dept: NUCLEAR MEDICINE | Age: 78
End: 2018-08-23
Payer: MEDICARE

## 2018-08-23 VITALS
TEMPERATURE: 97.6 F | RESPIRATION RATE: 16 BRPM | SYSTOLIC BLOOD PRESSURE: 136 MMHG | BODY MASS INDEX: 43.33 KG/M2 | WEIGHT: 229.5 LBS | DIASTOLIC BLOOD PRESSURE: 58 MMHG | OXYGEN SATURATION: 98 % | HEART RATE: 74 BPM | HEIGHT: 61 IN

## 2018-08-23 LAB
GLUCOSE BLD-MCNC: 175 MG/DL (ref 65–105)
GLUCOSE BLD-MCNC: 342 MG/DL (ref 65–105)

## 2018-08-23 PROCEDURE — G0378 HOSPITAL OBSERVATION PER HR: HCPCS

## 2018-08-23 PROCEDURE — 94664 DEMO&/EVAL PT USE INHALER: CPT

## 2018-08-23 PROCEDURE — 82947 ASSAY GLUCOSE BLOOD QUANT: CPT

## 2018-08-23 PROCEDURE — 2580000003 HC RX 258: Performed by: FAMILY MEDICINE

## 2018-08-23 PROCEDURE — 93005 ELECTROCARDIOGRAM TRACING: CPT

## 2018-08-23 PROCEDURE — 99220 PR INITIAL OBSERVATION CARE/DAY 70 MINUTES: CPT | Performed by: FAMILY MEDICINE

## 2018-08-23 RX ORDER — DEXTROSE MONOHYDRATE 25 G/50ML
12.5 INJECTION, SOLUTION INTRAVENOUS PRN
Status: DISCONTINUED | OUTPATIENT
Start: 2018-08-23 | End: 2018-08-23 | Stop reason: HOSPADM

## 2018-08-23 RX ORDER — NICOTINE POLACRILEX 4 MG
15 LOZENGE BUCCAL PRN
Status: DISCONTINUED | OUTPATIENT
Start: 2018-08-23 | End: 2018-08-23 | Stop reason: HOSPADM

## 2018-08-23 RX ORDER — AMINOPHYLLINE DIHYDRATE 25 MG/ML
100 INJECTION, SOLUTION INTRAVENOUS
Status: DISCONTINUED | OUTPATIENT
Start: 2018-08-23 | End: 2018-08-23

## 2018-08-23 RX ORDER — SODIUM CHLORIDE 0.9 % (FLUSH) 0.9 %
10 SYRINGE (ML) INJECTION PRN
Status: DISCONTINUED | OUTPATIENT
Start: 2018-08-23 | End: 2018-08-23 | Stop reason: HOSPADM

## 2018-08-23 RX ORDER — NITROGLYCERIN 0.4 MG/1
0.4 TABLET SUBLINGUAL EVERY 5 MIN PRN
Status: DISCONTINUED | OUTPATIENT
Start: 2018-08-23 | End: 2018-08-23 | Stop reason: HOSPADM

## 2018-08-23 RX ORDER — DEXTROSE MONOHYDRATE 50 MG/ML
100 INJECTION, SOLUTION INTRAVENOUS PRN
Status: DISCONTINUED | OUTPATIENT
Start: 2018-08-23 | End: 2018-08-23 | Stop reason: HOSPADM

## 2018-08-23 RX ORDER — METOPROLOL TARTRATE 5 MG/5ML
2.5 INJECTION INTRAVENOUS PRN
Status: DISCONTINUED | OUTPATIENT
Start: 2018-08-23 | End: 2018-08-23 | Stop reason: HOSPADM

## 2018-08-23 RX ORDER — CAFFEINE CITRATE 20 MG/ML
60 SOLUTION INTRAVENOUS
Status: DISCONTINUED | OUTPATIENT
Start: 2018-08-23 | End: 2018-08-23 | Stop reason: HOSPADM

## 2018-08-23 RX ORDER — 0.9 % SODIUM CHLORIDE 0.9 %
250 INTRAVENOUS SOLUTION INTRAVENOUS ONCE
Status: DISCONTINUED | OUTPATIENT
Start: 2018-08-23 | End: 2018-08-23 | Stop reason: HOSPADM

## 2018-08-23 RX ADMIN — SODIUM CHLORIDE: 9 INJECTION, SOLUTION INTRAVENOUS at 00:03

## 2018-08-23 ASSESSMENT — ENCOUNTER SYMPTOMS
BACK PAIN: 0
NAUSEA: 0
COUGH: 1
VOMITING: 0
RHINORRHEA: 0
CHEST TIGHTNESS: 1
SHORTNESS OF BREATH: 1
WHEEZING: 0

## 2018-08-23 NOTE — DISCHARGE INSTR - DIET

## 2018-08-23 NOTE — PROGRESS NOTES
Pt refusing to have lexiscan done. States that she just had one done in December and they didn't do anything. Cyndy Hopknis that she has an appointment September 11th and would just rather follow up with her cardiologist.  Dr. Joselito Ortiz notified.

## 2018-08-23 NOTE — H&P
ANGIOPLASTY WITH STENT PLACEMENT  1/18/2016    stent X 1    KNEE ARTHROSCOPY Right 1993    UPPER GASTROINTESTINAL ENDOSCOPY  5/5/09       Medications Prior to Admission:    Prior to Admission medications    Medication Sig Start Date End Date Taking?  Authorizing Provider   atorvastatin (LIPITOR) 20 MG tablet TAKE 1 TABLET BY MOUTH ONE TIME A DAY  8/15/18  Yes PHILIP Riojas CNP   spironolactone (ALDACTONE) 25 MG tablet TAKE 1 TABLET BY MOUTH ONE TIME A DAY  6/7/18  Yes Leonard Patel MD   magnesium oxide (MAG-OX) 400 (241.3 Mg) MG TABS tablet TAKE 1 TABLET BY MOUTH ONE TIME A DAY  6/7/18  Yes Leonard Patel MD   bortezomib (VELCADE) 3.5 MG chemo innjection Sig: once a week injection 6/5/18  Yes PHILIP Riojas CNP   vitamin D (ERGOCALCIFEROL) 38884 units CAPS capsule TAKE 1 CAPSULE BY MOUTH ONE TIME A WEEK  5/11/18  Yes Leonard Patel MD   carvedilol (COREG) 12.5 MG tablet Take 1 tablet by mouth 2 times daily 1/22/18  Yes Leonard Patel MD   insulin aspart (NOVOLOG) 100 UNIT/ML injection vial Inject into the skin 3 times daily (before meals)   Yes Historical Provider, MD   torsemide (DEMADEX) 20 MG tablet Take 1 tablet by mouth daily 11/3/17  Yes Leonard Patel MD   b complex vitamins capsule Take 1 capsule by mouth daily OTC   Yes Historical Provider, MD   insulin 70-30 (NOVOLIN 70/30) (70-30) 100 UNIT per ML injection vial Inject into the skin 2 times daily    Yes Historical Provider, MD   ondansetron (ZOFRAN) 8 MG tablet Take 8 mg by mouth every 8 hours as needed for Nausea or Vomiting   Yes Historical Provider, MD   acyclovir (ZOVIRAX) 200 MG capsule Take 200 mg by mouth 2 times daily  7/20/17  Yes Historical Provider, MD   allopurinol (ZYLOPRIM) 300 MG tablet Take 1 tablet by mouth daily 8/29/17  Yes Nati Mccormack APRN - CNP   albuterol sulfate HFA (PROVENTIL HFA) 108 (90 BASE) MCG/ACT inhaler Inhale 2 puffs into the lungs every 6 hours as needed for Wheezing 3/31/17 6/15/19 Father     Heart Disease Father     Stroke Father     Hypertension Sister     Diabetes Brother     Hypertension Brother     Kidney Disease Brother     Breast Cancer Daughter     Cancer Daughter         breast       PHYSICAL EXAM:    BP (!) 151/73   Pulse 92   Temp 98.1 °F (36.7 °C) (Oral)   Resp 16   Ht 5' 1\" (1.549 m)   Wt 229 lb 8 oz (104.1 kg)   SpO2 100%   BMI 43.36 kg/m²     General appearance: No apparent distress appears stated age and cooperative. HEENT Normal cephalic, atraumatic without obvious deformity. Pupils equal, round, and reactive to light. Extra ocular muscles intact. Conjunctivae/corneas clear. Neck: Supple, No jugular venous distention/bruits. Trachea midline without thyromegaly or adenopathy with full range of motion. Lungs: Clear to auscultation, bilaterally without Rales/Wheezes/Rhonchi with good respiratory effort. Heart: Regular rate and rhythm with Normal S1/S2 without murmurs, rubs or gallops, point of maximum impulse non-displaced  Abdomen: Soft, non-tender or non-distended without rigidity or guarding and positive bowel sounds all four quadrants. Extremities: No clubbing, cyanosis, or edema bilaterally. Full range of motion without deformity and normal gait intact. Skin: Skin color, texture, turgor normal.  No rashes or lesions. Neurologic: Alert and oriented X 3, neurovascularly intact with sensory/motor intact upper extremities/lower extremities, bilaterally. Cranial nerves: II-XII intact, grossly non-focal.  Mental status: Alert, oriented, thought content appropriate.     CXR:  I have reviewed the CXR with the following interpretation: no acute changes  EKG:  I have reviewed the EKG with the following interpretation: sinus rhythm with PAC's    CBC   Recent Labs      08/22/18 2055   WBC  3.1*   HGB  11.0*   HCT  33.0*   PLT  130*      RENAL  Recent Labs      08/22/18 2055   NA  134*   K  4.3   CL  93*   CO2  27   BUN  21   CREATININE  1.39*     LFT'S  No results for input(s): AST, ALT, ALB, BILIDIR, BILITOT, ALKPHOS in the last 72 hours. COAG  No results for input(s): INR in the last 72 hours. CARDIAC ENZYMES  No results for input(s): CKTOTAL, CKMB, CKMBINDEX, TROPONINI in the last 72 hours. U/A:    Lab Results   Component Value Date    NITRITE neg 08/16/2016    COLORU Yellow 06/15/2018    WBCUA 0 TO 2 05/03/2018    RBCUA 0 TO 2 05/03/2018    MUCUS NOT REPORTED 05/03/2018    BACTERIA None 05/03/2018    CLARITYU Clear 06/15/2018    SPECGRAV 1.015 06/15/2018    LEUKOCYTESUR Negative 06/15/2018    LEUKOCYTESUR NEGATIVE 05/03/2018    BLOODU Negative 06/15/2018    GLUCOSEU Negative 06/15/2018    AMORPHOUS NOT REPORTED 05/03/2018       ABG  No results found for: JBV1PWS, BEART, X2ORDBPJ, PHART, THGBART, QLF2MTJ, PO2ART, VAC7STE        Active Hospital Problems    Diagnosis Date Noted    Chest pain [R07.9] 08/22/2018    Essential hypertension [I10] 09/07/2017    Type 2 diabetes mellitus without complication, with long-term current use of insulin ([facility] [address] [E11.9, Z79.4] 09/07/2017    Cardiac amyloidosis ([facility] [address] [E85.4, I43] 07/20/2017    Cardiomyopathy, nonischemic (HCC) [I42.8] 05/03/2017    Organ-limited amyloidosis ([facility] [address] [E85.4] 05/03/2017    Multiple myeloma ([facility] [address] [C90.00] 03/31/2017    JAMES (obstructive sleep apnea) [G47.33] 03/31/2017    Chronic diastolic CHF (congestive heart failure) ([facility] [address] [I50.32] 03/19/2017    Morbid obesity due to excess calories ([facility] [address] [E66.01] 11/04/2016    Cervical pain (neck) [M54.2] 06/20/2016    Chronic renal insufficiency [N18.9] 03/21/2016    Chronic gout without tophus [M1A.9XX0] 07/21/2015         ASSESSMENT/PLAN:  Patient admitted with chest pain & palpitations. Co-morbidities as above.     Orders Placed This Encounter   Procedures    XR CHEST STANDARD (2 VW)    NM Myocardial Spect Rest Exercise or Rx    CBC Auto Differential    Basic Metabolic Panel    D-Dimer, Quantitative    Troponin    Brain Natriuretic Peptide   

## 2018-08-23 NOTE — PROGRESS NOTES
RN spoke with Dr. Dominguez Cheung.   Pt ok for discharge and to follow up with PCP and Cardiologist.

## 2018-08-23 NOTE — PLAN OF CARE
Problem: Falls - Risk of:  Goal: Will remain free from falls  Will remain free from falls   Outcome: Ongoing  Pt remained absent from falls. Call light within reach. Bed locked and in lowest position.

## 2018-08-23 NOTE — ED PROVIDER NOTES
angioplasty with stent (1/18/2016); Cardiac catheterization (1999, 2009, 2013, 2016); Cardiac catheterization (10/22/13); and Cardiac catheterization (1/16/18). Social History     Social History    Marital status:      Spouse name: N/A    Number of children: N/A    Years of education: N/A     Occupational History    Not on file. Social History Main Topics    Smoking status: Never Smoker    Smokeless tobacco: Never Used    Alcohol use No    Drug use: No    Sexual activity: Not on file     Other Topics Concern    Not on file     Social History Narrative    No narrative on file       Family History   Problem Relation Age of Onset    Diabetes Mother     Hypertension Mother     Heart Disease Mother     Stroke Mother     Hypertension Father     Heart Disease Father     Stroke Father     Hypertension Sister     Diabetes Brother     Hypertension Brother     Kidney Disease Brother     Breast Cancer Daughter     Cancer Daughter         breast       Allergies:  Hepatitis b virus vaccine; Norvasc [amlodipine besylate]; Tramadol; Fentanyl; Percocet [oxycodone-acetaminophen]; and Velcade [bortezomib]    Home Medications:  Prior to Admission medications    Medication Sig Start Date End Date Taking?  Authorizing Provider   atorvastatin (LIPITOR) 20 MG tablet TAKE 1 TABLET BY MOUTH ONE TIME A DAY  8/15/18  Yes PHILIP Tavera CNP   spironolactone (ALDACTONE) 25 MG tablet TAKE 1 TABLET BY MOUTH ONE TIME A DAY  6/7/18  Yes Min Osman MD   magnesium oxide (MAG-OX) 400 (241.3 Mg) MG TABS tablet TAKE 1 TABLET BY MOUTH ONE TIME A DAY  6/7/18  Yes Min Osman MD   bortezomib (VELCADE) 3.5 MG chemo innjection Sig: once a week injection 6/5/18  Yes PHILIP Tavera CNP   vitamin D (ERGOCALCIFEROL) 92009 units CAPS capsule TAKE 1 CAPSULE BY MOUTH ONE TIME A WEEK  5/11/18  Yes Min Osman MD   carvedilol (COREG) 12.5 MG tablet Take 1 tablet by mouth 2 times daily 1/22/18 81 mg by mouth daily. Yes Historical Provider, MD       REVIEW OF SYSTEMS    (2-9 systems for level 4, 10 or more for level 5)      Review of Systems   Constitutional: Positive for activity change and fatigue. Negative for appetite change, chills, diaphoresis and fever. HENT: Negative for congestion, postnasal drip and rhinorrhea. Respiratory: Positive for cough, chest tightness and shortness of breath. Negative for wheezing. Cardiovascular: Positive for chest pain and palpitations. Gastrointestinal: Negative for nausea and vomiting. Genitourinary: Negative for decreased urine volume, dysuria and urgency. Musculoskeletal: Negative for arthralgias and back pain. Skin: Negative for rash and wound. Neurological: Negative for light-headedness and headaches. PHYSICAL EXAM   (up to 7 for level 4, 8 or more for level 5)      INITIAL VITALS:   BP (!) 151/73   Pulse 92   Temp 98.1 °F (36.7 °C) (Oral)   Resp 16   Wt 252 lb (114.3 kg)   SpO2 100%   BMI 47.61 kg/m²     Physical Exam   Constitutional: She is oriented to person, place, and time. She appears well-developed and well-nourished. No distress. HENT:   Head: Normocephalic and atraumatic. Mouth/Throat: Oropharynx is clear and moist.   Eyes: Conjunctivae are normal. Right eye exhibits no discharge. Left eye exhibits no discharge. Neck: Normal range of motion. Neck supple. Cardiovascular: Normal rate. No murmur heard. Pulmonary/Chest: Effort normal. No respiratory distress. She has no wheezes. She exhibits no tenderness. Abdominal: Soft. Bowel sounds are normal. She exhibits no distension. There is no tenderness. There is no rebound and no guarding. Musculoskeletal: Normal range of motion. She exhibits no edema, tenderness or deformity. Neurological: She is alert and oriented to person, place, and time. No cranial nerve deficit. She exhibits normal muscle tone. Coordination normal.   Skin: Skin is warm. No erythema. Nursing note and vitals reviewed.       DIFFERENTIAL  DIAGNOSIS     PLAN (LABS / IMAGING / EKG):  Orders Placed This Encounter   Procedures    XR CHEST STANDARD (2 VW)    CBC Auto Differential    Basic Metabolic Panel    D-Dimer, Quantitative    Troponin    Brain Natriuretic Peptide    Diet NPO, After Midnight    Vital signs per unit routine    Notify physician    Notify physician    Up as tolerated    Place intermittent pneumatic compression device    Telemetry monitoring    Full Code    Inpatient consult to Primary Care Provider    POC Glucose Fingerstick    EKG 12 Lead    Insert peripheral IV    PATIENT STATUS (FROM ED OR OR/PROCEDURAL) Observation       MEDICATIONS ORDERED:  Orders Placed This Encounter   Medications    aspirin chewable tablet 324 mg    insulin regular (HUMULIN R;NOVOLIN R) injection 10 Units    0.9 % sodium chloride bolus    enoxaparin (LOVENOX) injection 120 mg    aspirin EC tablet 81 mg    levothyroxine (SYNTHROID) tablet 75 mcg    nitroGLYCERIN (NITROSTAT) SL tablet 0.4 mg    gabapentin (NEURONTIN) capsule 300 mg    clopidogrel (PLAVIX) tablet 75 mg    pantoprazole (PROTONIX) tablet 20 mg    isosorbide mononitrate (IMDUR) extended release tablet 30 mg    mometasone-formoterol (DULERA) 100-5 MCG/ACT inhaler 2 puff    albuterol sulfate  (90 Base) MCG/ACT inhaler 2 puff    allopurinol (ZYLOPRIM) tablet 300 mg    ondansetron (ZOFRAN) tablet 8 mg    acyclovir (ZOVIRAX) capsule 200 mg    b complex vitamins capsule 1 capsule    torsemide (DEMADEX) tablet 20 mg    carvedilol (COREG) tablet 12.5 mg    vitamin D (ERGOCALCIFEROL) capsule 50,000 Units    spironolactone (ALDACTONE) tablet 25 mg    magnesium oxide (MAG-OX) tablet 400 mg    atorvastatin (LIPITOR) tablet 20 mg    sodium chloride flush 0.9 % injection 10 mL    sodium chloride flush 0.9 % injection 10 mL    acetaminophen (TYLENOL) tablet 650 mg    0.9 % sodium chloride infusion    insulin regular (HUMULIN R;NOVOLIN R) injection 5 Units    insulin lispro (HUMALOG) injection vial 0-12 Units    insulin lispro (HUMALOG) injection vial 0-6 Units       DDX: ACS, PE, PNA, bronchitis, Hyperglycemia, considered but less likely.     DIAGNOSTIC RESULTS / EMERGENCY DEPARTMENT COURSE / MDM     LABS:  Results for orders placed or performed during the hospital encounter of 08/22/18   CBC Auto Differential   Result Value Ref Range    WBC 3.1 (L) 3.5 - 11.0 k/uL    RBC 3.34 (L) 4.0 - 5.2 m/uL    Hemoglobin 11.0 (L) 12.0 - 16.0 g/dL    Hematocrit 33.0 (L) 36 - 46 %    MCV 98.8 80 - 100 fL    MCH 32.8 26 - 34 pg    MCHC 33.2 31 - 37 g/dL    RDW 17.4 (H) 11.5 - 14.9 %    Platelets 258 (L) 423 - 450 k/uL    MPV 8.4 6.0 - 12.0 fL    NRBC Automated NOT REPORTED per 100 WBC    Differential Type NOT REPORTED     Immature Granulocytes NOT REPORTED 0 %    Absolute Immature Granulocyte NOT REPORTED 0.00 - 0.30 k/uL    WBC Morphology NOT REPORTED     RBC Morphology NOT REPORTED     Platelet Estimate NOT REPORTED     Seg Neutrophils 63 36 - 66 %    Lymphocytes 18 (L) 24 - 44 %    Monocytes 16 (H) 1 - 7 %    Eosinophils % 3 0 - 4 %    Basophils 0 0 - 2 %    Segs Absolute 1.95 1.3 - 9.1 k/uL    Absolute Lymph # 0.56 (L) 1.0 - 4.8 k/uL    Absolute Mono # 0.50 0.1 - 1.3 k/uL    Absolute Eos # 0.09 0.0 - 0.4 k/uL    Basophils # 0.00 0.0 - 0.2 k/uL    Morphology ANISOCYTOSIS PRESENT    Basic Metabolic Panel   Result Value Ref Range    Glucose 456 (HH) 70 - 99 mg/dL    BUN 21 8 - 23 mg/dL    CREATININE 1.39 (H) 0.50 - 0.90 mg/dL    Bun/Cre Ratio NOT REPORTED 9 - 20    Calcium 9.3 8.6 - 10.4 mg/dL    Sodium 134 (L) 135 - 144 mmol/L    Potassium 4.3 3.7 - 5.3 mmol/L    Chloride 93 (L) 98 - 107 mmol/L    CO2 27 20 - 31 mmol/L    Anion Gap 14 9 - 17 mmol/L    GFR Non-African American 37 (L) >60 mL/min    GFR  44 (L) >60 mL/min    GFR Comment          GFR Staging NOT REPORTED    D-Dimer, Quantitative   Result Value Ref Range

## 2018-08-24 ENCOUNTER — CARE COORDINATION (OUTPATIENT)
Dept: CASE MANAGEMENT | Age: 78
End: 2018-08-24

## 2018-08-24 DIAGNOSIS — R07.9 CHEST PAIN, UNSPECIFIED TYPE: Primary | ICD-10-CM

## 2018-08-24 PROCEDURE — 1111F DSCHRG MED/CURRENT MED MERGE: CPT

## 2018-08-28 PROBLEM — D64.9 ANEMIA: Status: ACTIVE | Noted: 2018-08-28

## 2018-08-28 PROBLEM — B37.0 THRUSH, ORAL: Status: ACTIVE | Noted: 2018-08-28

## 2018-10-27 ENCOUNTER — APPOINTMENT (OUTPATIENT)
Dept: CT IMAGING | Age: 78
End: 2018-10-27
Payer: MEDICARE

## 2018-10-27 ENCOUNTER — HOSPITAL ENCOUNTER (EMERGENCY)
Age: 78
Discharge: HOME | End: 2018-10-27
Attending: EMERGENCY MEDICINE
Payer: MEDICARE

## 2018-10-27 ENCOUNTER — APPOINTMENT (OUTPATIENT)
Dept: GENERAL RADIOLOGY | Age: 78
End: 2018-10-27
Payer: MEDICARE

## 2018-10-27 VITALS
WEIGHT: 234 LBS | RESPIRATION RATE: 23 BRPM | HEART RATE: 91 BPM | SYSTOLIC BLOOD PRESSURE: 162 MMHG | DIASTOLIC BLOOD PRESSURE: 67 MMHG | TEMPERATURE: 98.2 F | HEIGHT: 61 IN | BODY MASS INDEX: 44.18 KG/M2 | OXYGEN SATURATION: 93 %

## 2018-10-27 DIAGNOSIS — R10.9 ABDOMINAL PAIN, UNSPECIFIED ABDOMINAL LOCATION: Primary | ICD-10-CM

## 2018-10-27 LAB
ABSOLUTE EOS #: 0.1 K/UL (ref 0–0.4)
ABSOLUTE IMMATURE GRANULOCYTE: ABNORMAL K/UL (ref 0–0.3)
ABSOLUTE LYMPH #: 0.9 K/UL (ref 1–4.8)
ABSOLUTE MONO #: 0.4 K/UL (ref 0.1–1.3)
ALBUMIN SERPL-MCNC: 4 G/DL (ref 3.5–5.2)
ALBUMIN/GLOBULIN RATIO: ABNORMAL (ref 1–2.5)
ALP BLD-CCNC: 132 U/L (ref 35–104)
ALT SERPL-CCNC: 26 U/L (ref 5–33)
ANION GAP SERPL CALCULATED.3IONS-SCNC: 13 MMOL/L (ref 9–17)
AST SERPL-CCNC: 37 U/L
BASOPHILS # BLD: 0 % (ref 0–2)
BASOPHILS ABSOLUTE: 0 K/UL (ref 0–0.2)
BILIRUB SERPL-MCNC: 0.98 MG/DL (ref 0.3–1.2)
BUN BLDV-MCNC: 22 MG/DL (ref 8–23)
BUN/CREAT BLD: ABNORMAL (ref 9–20)
CALCIUM SERPL-MCNC: 10 MG/DL (ref 8.6–10.4)
CHLORIDE BLD-SCNC: 95 MMOL/L (ref 98–107)
CO2: 27 MMOL/L (ref 20–31)
CREAT SERPL-MCNC: 1.15 MG/DL (ref 0.5–0.9)
DIFFERENTIAL TYPE: ABNORMAL
EOSINOPHILS RELATIVE PERCENT: 1 % (ref 0–4)
GFR AFRICAN AMERICAN: 55 ML/MIN
GFR NON-AFRICAN AMERICAN: 46 ML/MIN
GFR SERPL CREATININE-BSD FRML MDRD: ABNORMAL ML/MIN/{1.73_M2}
GFR SERPL CREATININE-BSD FRML MDRD: ABNORMAL ML/MIN/{1.73_M2}
GLUCOSE BLD-MCNC: 297 MG/DL (ref 70–99)
HCT VFR BLD CALC: 36.9 % (ref 36–46)
HEMOGLOBIN: 12 G/DL (ref 12–16)
IMMATURE GRANULOCYTES: ABNORMAL %
LACTIC ACID, SEPSIS WHOLE BLOOD: ABNORMAL MMOL/L (ref 0.5–1.9)
LACTIC ACID, SEPSIS WHOLE BLOOD: NORMAL MMOL/L (ref 0.5–1.9)
LACTIC ACID, SEPSIS: 1.2 MMOL/L (ref 0.5–1.9)
LACTIC ACID, SEPSIS: 2.2 MMOL/L (ref 0.5–1.9)
LIPASE: 26 U/L (ref 13–60)
LYMPHOCYTES # BLD: 14 % (ref 24–44)
MCH RBC QN AUTO: 32.1 PG (ref 26–34)
MCHC RBC AUTO-ENTMCNC: 32.5 G/DL (ref 31–37)
MCV RBC AUTO: 98.9 FL (ref 80–100)
MONOCYTES # BLD: 6 % (ref 1–7)
NRBC AUTOMATED: ABNORMAL PER 100 WBC
PDW BLD-RTO: 16.1 % (ref 11.5–14.9)
PLATELET # BLD: 151 K/UL (ref 150–450)
PLATELET ESTIMATE: ABNORMAL
PMV BLD AUTO: 9.5 FL (ref 6–12)
POTASSIUM SERPL-SCNC: 4.2 MMOL/L (ref 3.7–5.3)
RBC # BLD: 3.73 M/UL (ref 4–5.2)
RBC # BLD: ABNORMAL 10*6/UL
SEG NEUTROPHILS: 79 % (ref 36–66)
SEGMENTED NEUTROPHILS ABSOLUTE COUNT: 5.2 K/UL (ref 1.3–9.1)
SODIUM BLD-SCNC: 135 MMOL/L (ref 135–144)
TOTAL PROTEIN: 8.3 G/DL (ref 6.4–8.3)
WBC # BLD: 6.7 K/UL (ref 3.5–11)
WBC # BLD: ABNORMAL 10*3/UL

## 2018-10-27 PROCEDURE — 6360000004 HC RX CONTRAST MEDICATION: Performed by: STUDENT IN AN ORGANIZED HEALTH CARE EDUCATION/TRAINING PROGRAM

## 2018-10-27 PROCEDURE — 36415 COLL VENOUS BLD VENIPUNCTURE: CPT

## 2018-10-27 PROCEDURE — 99284 EMERGENCY DEPT VISIT MOD MDM: CPT

## 2018-10-27 PROCEDURE — 74177 CT ABD & PELVIS W/CONTRAST: CPT

## 2018-10-27 PROCEDURE — 83605 ASSAY OF LACTIC ACID: CPT

## 2018-10-27 PROCEDURE — 83690 ASSAY OF LIPASE: CPT

## 2018-10-27 PROCEDURE — 2580000003 HC RX 258: Performed by: STUDENT IN AN ORGANIZED HEALTH CARE EDUCATION/TRAINING PROGRAM

## 2018-10-27 PROCEDURE — 80053 COMPREHEN METABOLIC PANEL: CPT

## 2018-10-27 PROCEDURE — 71046 X-RAY EXAM CHEST 2 VIEWS: CPT

## 2018-10-27 PROCEDURE — 85025 COMPLETE CBC W/AUTO DIFF WBC: CPT

## 2018-10-27 RX ORDER — LEVOFLOXACIN 500 MG/1
500 TABLET, FILM COATED ORAL DAILY
Qty: 5 TABLET | Refills: 0 | Status: SHIPPED | OUTPATIENT
Start: 2018-10-27 | End: 2018-11-01

## 2018-10-27 RX ORDER — ONDANSETRON 4 MG/1
4 TABLET, ORALLY DISINTEGRATING ORAL EVERY 8 HOURS PRN
Qty: 20 TABLET | Refills: 0 | Status: SHIPPED | OUTPATIENT
Start: 2018-10-27

## 2018-10-27 RX ORDER — OXYCODONE HYDROCHLORIDE AND ACETAMINOPHEN 5; 325 MG/1; MG/1
1-2 TABLET ORAL EVERY 6 HOURS PRN
Qty: 5 TABLET | Refills: 0 | Status: SHIPPED | OUTPATIENT
Start: 2018-10-27 | End: 2018-10-28

## 2018-10-27 RX ORDER — 0.9 % SODIUM CHLORIDE 0.9 %
80 INTRAVENOUS SOLUTION INTRAVENOUS ONCE
Status: COMPLETED | OUTPATIENT
Start: 2018-10-27 | End: 2018-10-27

## 2018-10-27 RX ORDER — FUROSEMIDE 20 MG/1
20 TABLET ORAL SEE ADMIN INSTRUCTIONS
COMMUNITY
End: 2019-03-22 | Stop reason: ALTCHOICE

## 2018-10-27 RX ORDER — 0.9 % SODIUM CHLORIDE 0.9 %
1000 INTRAVENOUS SOLUTION INTRAVENOUS ONCE
Status: COMPLETED | OUTPATIENT
Start: 2018-10-27 | End: 2018-10-27

## 2018-10-27 RX ORDER — BENZONATATE 100 MG/1
100 CAPSULE ORAL 3 TIMES DAILY PRN
Qty: 30 CAPSULE | Refills: 0 | Status: SHIPPED | OUTPATIENT
Start: 2018-10-27 | End: 2018-11-03

## 2018-10-27 RX ORDER — BENZONATATE 100 MG/1
100 CAPSULE ORAL 3 TIMES DAILY PRN
Status: DISCONTINUED | OUTPATIENT
Start: 2018-10-27 | End: 2018-10-27 | Stop reason: HOSPADM

## 2018-10-27 RX ORDER — SODIUM CHLORIDE 0.9 % (FLUSH) 0.9 %
10 SYRINGE (ML) INJECTION PRN
Status: DISCONTINUED | OUTPATIENT
Start: 2018-10-27 | End: 2018-10-27 | Stop reason: HOSPADM

## 2018-10-27 RX ADMIN — IOPAMIDOL 75 ML: 755 INJECTION, SOLUTION INTRAVENOUS at 19:02

## 2018-10-27 RX ADMIN — SODIUM CHLORIDE 1000 ML: 9 INJECTION, SOLUTION INTRAVENOUS at 19:22

## 2018-10-27 RX ADMIN — Medication 10 ML: at 19:02

## 2018-10-27 RX ADMIN — SODIUM CHLORIDE 80 ML: 9 INJECTION, SOLUTION INTRAVENOUS at 19:02

## 2018-10-27 ASSESSMENT — PAIN DESCRIPTION - PAIN TYPE: TYPE: ACUTE PAIN

## 2018-10-27 ASSESSMENT — ENCOUNTER SYMPTOMS
BACK PAIN: 0
CHEST TIGHTNESS: 0
NAUSEA: 0
PHOTOPHOBIA: 0
SHORTNESS OF BREATH: 0
ABDOMINAL PAIN: 1
SORE THROAT: 0
WHEEZING: 0
VOMITING: 0
COUGH: 0
TROUBLE SWALLOWING: 0

## 2018-10-27 ASSESSMENT — PAIN SCALES - GENERAL: PAINLEVEL_OUTOF10: 9

## 2018-10-27 ASSESSMENT — PAIN DESCRIPTION - ORIENTATION: ORIENTATION: MID

## 2018-10-27 ASSESSMENT — PAIN DESCRIPTION - LOCATION: LOCATION: ABDOMEN

## 2018-10-27 NOTE — ED PROVIDER NOTES
[address] ED  Emergency Department Encounter  EmergencyMedicine Resident     Pt Elvin Sawyer  MRN: [id number]  Birthdate 1940  Date of evaluation: 10/27/18  PCP:  PHILIP Roque CNP    CHIEF COMPLAINT       Chief Complaint   Patient presents with    Abdominal Pain       HISTORY OF PRESENT ILLNESS  (Location/Symptom, Timing/Onset, Context/Setting, Quality, Duration, Modifying Factors, Severity.)      Lorene Doran is a 65 y.o. female who presents with worsening abdominal pain since yesterday evening. Patient denying any nausea, vomiting. She states that she did have one loose stool yesterday. Patient has been unable to pass stool today. Patient describing pain is bilateral lower quadrant, tearing, ripping type of pain that radiates to her back. Patient with history of multiple myeloma, takes chemotherapy medication. She is also insulin-dependent diabetic. Patient states that she's been compliant with her medications. Patient denies any chest pain. She does state that she's been having a cough. She denies any shortness of breath. No fevers, chills, sweats. She also admits to possible dysuria. PAST MEDICAL / SURGICAL / SOCIAL / FAMILY HISTORY      has a past medical history of Allergic rhinitis; CAD (coronary artery disease); Cholelithiasis; Chronic cough; Chronic cystitis; Colitis; GERD; Gout; Guillain-[place] ([address] Hyperlipidemia; Hypertension; Irritable bowel syndrome; Migraines; Multiple myeloma ([facility] [address] OA (osteoarthritis); Osteoarthritis; Osteopenia; Pneumonia; Post-menopausal; Pulmonary nodules; Raynaud's disease; S/P cardiac cath; Sleep apnea; Thyroid disease; Type 2 diabetes mellitus; and Type II or unspecified type diabetes mellitus without mention of complication, not stated as uncontrolled. has a past surgical history that includes Knee arthroscopy (Right, 1993); Appendectomy (07/16/12); Colonoscopy (5/5/09);  Upper gastrointestinal endoscopy DISCONTD: benzonatate (TESSALON) capsule 100 mg    benzonatate (TESSALON PERLES) 100 MG capsule     Sig: Take 1 capsule by mouth 3 times daily as needed for Cough     Dispense:  30 capsule     Refill:  0       DDX: GERD, PUD, pancreatitis, cholecystitis, GB colic, cholangitis, Jnmg-Lszr-Valwty, ACS/ MI, pneumonia, SBO, DKA, AAA, mesenteric ischemia, perforated viscous, acute gastroenteritis, NSAP, pyelonephritis, kidney stone, appendicitis, hernia, UTI, constipation, ectopic, ovarian torsion, ovarian cyst, PID, tuboovarian abscess, period/ fibroid      DIAGNOSTIC RESULTS / EMERGENCY DEPARTMENT COURSE / MDM     LABS:  Results for orders placed or performed during the hospital encounter of 10/27/18   CBC Auto Differential   Result Value Ref Range    WBC 6.7 3.5 - 11.0 k/uL    RBC 3.73 (L) 4.0 - 5.2 m/uL    Hemoglobin 12.0 12.0 - 16.0 g/dL    Hematocrit 36.9 36 - 46 %    MCV 98.9 80 - 100 fL    MCH 32.1 26 - 34 pg    MCHC 32.5 31 - 37 g/dL    RDW 16.1 (H) 11.5 - 14.9 %    Platelets 467 162 - 210 k/uL    MPV 9.5 6.0 - 12.0 fL    NRBC Automated NOT REPORTED per 100 WBC    Differential Type NOT REPORTED     Seg Neutrophils 79 (H) 36 - 66 %    Lymphocytes 14 (L) 24 - 44 %    Monocytes 6 1 - 7 %    Eosinophils % 1 0 - 4 %    Basophils 0 0 - 2 %    Immature Granulocytes NOT REPORTED 0 %    Segs Absolute 5.20 1.3 - 9.1 k/uL    Absolute Lymph # 0.90 (L) 1.0 - 4.8 k/uL    Absolute Mono # 0.40 0.1 - 1.3 k/uL    Absolute Eos # 0.10 0.0 - 0.4 k/uL    Basophils # 0.00 0.0 - 0.2 k/uL    Absolute Immature Granulocyte NOT REPORTED 0.00 - 0.30 k/uL    WBC Morphology NOT REPORTED     RBC Morphology NOT REPORTED     Platelet Estimate NOT REPORTED    Comprehensive Metabolic Panel   Result Value Ref Range    Glucose 297 (H) 70 - 99 mg/dL    BUN 22 8 - 23 mg/dL    CREATININE 1.15 (H) 0.50 - 0.90 mg/dL    Bun/Cre Ratio NOT REPORTED 9 - 20    Calcium 10.0 8.6 - 10.4 mg/dL    Sodium 135 135 - 144 mmol/L    Potassium 4.2 3.7 - 5.3 mmol/L

## 2018-10-29 ENCOUNTER — OFFICE VISIT (OUTPATIENT)
Dept: PODIATRY | Age: 78
End: 2018-10-29
Payer: MEDICARE

## 2018-10-29 VITALS — HEIGHT: 61 IN | BODY MASS INDEX: 47.58 KG/M2 | WEIGHT: 252 LBS

## 2018-10-29 DIAGNOSIS — I73.9 PVD (PERIPHERAL VASCULAR DISEASE) (HCC): ICD-10-CM

## 2018-10-29 DIAGNOSIS — M79.671 PAIN IN BOTH FEET: ICD-10-CM

## 2018-10-29 DIAGNOSIS — B35.1 DERMATOPHYTOSIS OF NAIL: ICD-10-CM

## 2018-10-29 DIAGNOSIS — E11.51 TYPE II DIABETES MELLITUS WITH PERIPHERAL CIRCULATORY DISORDER (HCC): Primary | ICD-10-CM

## 2018-10-29 DIAGNOSIS — D23.71 BENIGN NEOPLASM OF SKIN OF LOWER LIMB, INCLUDING HIP, RIGHT: ICD-10-CM

## 2018-10-29 DIAGNOSIS — M79.672 PAIN IN BOTH FEET: ICD-10-CM

## 2018-10-29 DIAGNOSIS — D23.72 BENIGN NEOPLASM OF SKIN OF LOWER LIMB, INCLUDING HIP, LEFT: ICD-10-CM

## 2018-10-29 PROCEDURE — 1090F PRES/ABSN URINE INCON ASSESS: CPT | Performed by: PODIATRIST

## 2018-10-29 PROCEDURE — 17110 DESTRUCTION B9 LES UP TO 14: CPT | Performed by: PODIATRIST

## 2018-10-29 PROCEDURE — 1036F TOBACCO NON-USER: CPT | Performed by: PODIATRIST

## 2018-10-29 PROCEDURE — G8427 DOCREV CUR MEDS BY ELIG CLIN: HCPCS | Performed by: PODIATRIST

## 2018-10-29 PROCEDURE — G8598 ASA/ANTIPLAT THER USED: HCPCS | Performed by: PODIATRIST

## 2018-10-29 PROCEDURE — 1123F ACP DISCUSS/DSCN MKR DOCD: CPT | Performed by: PODIATRIST

## 2018-10-29 PROCEDURE — 11721 DEBRIDE NAIL 6 OR MORE: CPT | Performed by: PODIATRIST

## 2018-10-29 PROCEDURE — 4040F PNEUMOC VAC/ADMIN/RCVD: CPT | Performed by: PODIATRIST

## 2018-10-29 PROCEDURE — 1101F PT FALLS ASSESS-DOCD LE1/YR: CPT | Performed by: PODIATRIST

## 2018-10-29 PROCEDURE — G8399 PT W/DXA RESULTS DOCUMENT: HCPCS | Performed by: PODIATRIST

## 2018-10-29 PROCEDURE — G8417 CALC BMI ABV UP PARAM F/U: HCPCS | Performed by: PODIATRIST

## 2018-10-29 PROCEDURE — G8484 FLU IMMUNIZE NO ADMIN: HCPCS | Performed by: PODIATRIST

## 2018-10-29 NOTE — PROGRESS NOTES
 Pneumonia     Post-menopausal     vaginal atrophy    Pulmonary nodules     Raynaud's disease     S/P cardiac cath     x4    Sleep apnea     no machine at night.  Thyroid disease     Type 2 diabetes mellitus 9/14/2011    Type II or unspecified type diabetes mellitus without mention of complication, not stated as uncontrolled        Allergies   Allergen Reactions    Hepatitis B Virus Vaccine Other (See Comments)     [place] (toxic reaction)    Norvasc [Amlodipine Besylate]      Swelling. COMMON SIDE EFFECT      Tramadol      Other reaction(s):  Intolerance  Seizures felt to be related    Fentanyl Nausea And Vomiting    Percocet [Oxycodone-Acetaminophen] Nausea And Vomiting    Velcade [Bortezomib] Diarrhea and Rash     Current Outpatient Prescriptions on File Prior to Visit   Medication Sig Dispense Refill    furosemide (LASIX) 20 MG tablet Take 20 mg by mouth See Admin Instructions      ondansetron (ZOFRAN ODT) 4 MG disintegrating tablet Take 1 tablet by mouth every 8 hours as needed for Nausea 20 tablet 0    levofloxacin (LEVAQUIN) 500 MG tablet Take 1 tablet by mouth daily for 5 days 5 tablet 0    benzonatate (TESSALON PERLES) 100 MG capsule Take 1 capsule by mouth 3 times daily as needed for Cough 30 capsule 0    torsemide (DEMADEX) 20 MG tablet TAKE 40 MG ON M-W-F AND 20 MG T-TH-SA-SUN 45 tablet 3    allopurinol (ZYLOPRIM) 300 MG tablet TAKE 1 TABLET BY MOUTH ONE TIME A DAY  30 tablet 10    vitamin D (ERGOCALCIFEROL) 75111 units CAPS capsule TAKE 1 CAPSULE BY MOUTH ONE TIME A WEEK  12 capsule 0    atorvastatin (LIPITOR) 20 MG tablet TAKE 1 TABLET BY MOUTH ONE TIME A DAY  90 tablet 10    spironolactone (ALDACTONE) 25 MG tablet TAKE 1 TABLET BY MOUTH ONE TIME A DAY  30 tablet 2    magnesium oxide (MAG-OX) 400 (241.3 Mg) MG TABS tablet TAKE 1 TABLET BY MOUTH ONE TIME A DAY  30 tablet 1    bortezomib (VELCADE) 3.5 MG chemo innjection Sig: once a week injection 2.9 mg 0   

## 2018-11-28 PROBLEM — Z99.89 AMBULATES WITH CANE: Status: ACTIVE | Noted: 2018-11-28

## 2018-12-12 ENCOUNTER — OFFICE VISIT (OUTPATIENT)
Dept: PODIATRY | Age: 78
End: 2018-12-12
Payer: MEDICARE

## 2018-12-12 VITALS — BODY MASS INDEX: 44.76 KG/M2 | WEIGHT: 228 LBS | HEIGHT: 60 IN

## 2018-12-12 DIAGNOSIS — M79.671 PAIN IN BOTH FEET: ICD-10-CM

## 2018-12-12 DIAGNOSIS — B35.1 DERMATOPHYTOSIS OF NAIL: ICD-10-CM

## 2018-12-12 DIAGNOSIS — M79.672 PAIN IN BOTH FEET: ICD-10-CM

## 2018-12-12 DIAGNOSIS — I73.9 PVD (PERIPHERAL VASCULAR DISEASE) (HCC): ICD-10-CM

## 2018-12-12 DIAGNOSIS — E11.51 TYPE II DIABETES MELLITUS WITH PERIPHERAL CIRCULATORY DISORDER (HCC): Primary | ICD-10-CM

## 2018-12-12 PROCEDURE — 1101F PT FALLS ASSESS-DOCD LE1/YR: CPT | Performed by: PODIATRIST

## 2018-12-12 PROCEDURE — 4040F PNEUMOC VAC/ADMIN/RCVD: CPT | Performed by: PODIATRIST

## 2018-12-12 PROCEDURE — 1123F ACP DISCUSS/DSCN MKR DOCD: CPT | Performed by: PODIATRIST

## 2018-12-12 PROCEDURE — G8484 FLU IMMUNIZE NO ADMIN: HCPCS | Performed by: PODIATRIST

## 2018-12-12 PROCEDURE — G8399 PT W/DXA RESULTS DOCUMENT: HCPCS | Performed by: PODIATRIST

## 2018-12-12 PROCEDURE — G8427 DOCREV CUR MEDS BY ELIG CLIN: HCPCS | Performed by: PODIATRIST

## 2018-12-12 PROCEDURE — 99213 OFFICE O/P EST LOW 20 MIN: CPT | Performed by: PODIATRIST

## 2018-12-12 PROCEDURE — G8417 CALC BMI ABV UP PARAM F/U: HCPCS | Performed by: PODIATRIST

## 2018-12-12 PROCEDURE — G8598 ASA/ANTIPLAT THER USED: HCPCS | Performed by: PODIATRIST

## 2018-12-12 PROCEDURE — 1036F TOBACCO NON-USER: CPT | Performed by: PODIATRIST

## 2018-12-12 PROCEDURE — 11721 DEBRIDE NAIL 6 OR MORE: CPT | Performed by: PODIATRIST

## 2018-12-12 PROCEDURE — 1090F PRES/ABSN URINE INCON ASSESS: CPT | Performed by: PODIATRIST

## 2018-12-12 RX ORDER — PYRIDOXINE HCL (VITAMIN B6) 50 MG
50 TABLET ORAL
COMMUNITY
Start: 2018-12-10 | End: 2019-07-19 | Stop reason: ALTCHOICE

## 2018-12-12 NOTE — PROGRESS NOTES
[address] [facility] [id number] [facility]  [address]  Dept: [phone]    DIABETIC NAIL PROGRESS NOTE  Date of patient's visit: 12/12/2018  Patient's Name:  Kye Starr YOB: 1940            Patient Care Team:  PHILIP Kang CNP as PCP - General (Nurse Practitioner)  PHILIP Kang CNP as PCP - S Attributed Provider  Suresh Thornton MD as Consulting Physician (Cardiology)  Trino Garcia as Consulting Physician (Endocrinology)  Kayla Miles MD as Consulting Physician (Urology)  Matthew Villalpando MD as Consulting Physician (Internal Medicine)  Richelle Sharif MD as Consulting Physician (General Surgery)          Chief Complaint   Patient presents with    Diabetes    Nail Problem     pcp is dr Wendi Koehler last seen november 27 2018    Circulatory Problem       Subjective:   Kye Starr comes to clinic for Diabetes; Nail Problem (pcp is dr Wendi Koehler last seen november 27 2018); and Circulatory Problem    she is a diabetic and states that her sugars have been really high lately. Pt currently has complaint of thickened, elongated nails that they cannot manage by themselves. Pt's primary care physician is PHILIP Kang CNP last seen November 27 2018   Pt's last blood sugar was 380 . Lab Results   Component Value Date    LABA1C 8.2 03/01/2017      Complains of numbness in the feet bilat. Past Medical History:   Diagnosis Date    Allergic rhinitis     CAD (coronary artery disease)     s/p stents  total x5    Cholelithiasis     Chronic cough 9/14/2011    Chronic cystitis 9/14/2011    Colitis 09/2016    GERD 9/14/2011    Gout     Guillain-[place] ([facility] [address]     history of     Hyperlipidemia     Hypertension     Irritable bowel syndrome     Migraines     hx.  of    Multiple myeloma ([facility] [address]     OA (osteoarthritis) 9/14/2011    Osteoarthritis     Osteopenia     Pneumonia     Post-menopausal     vaginal

## 2018-12-20 ENCOUNTER — HOSPITAL ENCOUNTER (EMERGENCY)
Age: 78
Discharge: HOME | End: 2018-12-20
Attending: EMERGENCY MEDICINE
Payer: MEDICARE

## 2018-12-20 ENCOUNTER — APPOINTMENT (OUTPATIENT)
Dept: GENERAL RADIOLOGY | Age: 78
End: 2018-12-20
Payer: MEDICARE

## 2018-12-20 VITALS
DIASTOLIC BLOOD PRESSURE: 55 MMHG | TEMPERATURE: 97.8 F | BODY MASS INDEX: 41.72 KG/M2 | OXYGEN SATURATION: 99 % | WEIGHT: 221 LBS | SYSTOLIC BLOOD PRESSURE: 151 MMHG | RESPIRATION RATE: 16 BRPM | HEIGHT: 61 IN | HEART RATE: 78 BPM

## 2018-12-20 DIAGNOSIS — K59.00 CONSTIPATION, UNSPECIFIED CONSTIPATION TYPE: Primary | ICD-10-CM

## 2018-12-20 PROCEDURE — 99283 EMERGENCY DEPT VISIT LOW MDM: CPT

## 2018-12-20 PROCEDURE — 74022 RADEX COMPL AQT ABD SERIES: CPT

## 2018-12-20 PROCEDURE — 6370000000 HC RX 637 (ALT 250 FOR IP): Performed by: STUDENT IN AN ORGANIZED HEALTH CARE EDUCATION/TRAINING PROGRAM

## 2018-12-20 RX ORDER — POLYETHYLENE GLYCOL 3350 17 G/17G
17 POWDER, FOR SOLUTION ORAL DAILY
Qty: 1 BOTTLE | Refills: 3 | Status: SHIPPED | OUTPATIENT
Start: 2018-12-20 | End: 2018-12-27

## 2018-12-20 RX ORDER — MAGNESIUM CARB/ALUMINUM HYDROX 105-160MG
296 TABLET,CHEWABLE ORAL ONCE
Qty: 296 ML | Refills: 0 | Status: SHIPPED | OUTPATIENT
Start: 2018-12-20 | End: 2018-12-20

## 2018-12-20 RX ADMIN — MAGESIUM CITRATE 296 ML: 1.75 LIQUID ORAL at 20:47

## 2018-12-20 ASSESSMENT — PAIN DESCRIPTION - LOCATION: LOCATION: RECTUM

## 2018-12-20 ASSESSMENT — PAIN DESCRIPTION - DESCRIPTORS: DESCRIPTORS: BURNING;SHARP;CONSTANT

## 2018-12-20 ASSESSMENT — PAIN DESCRIPTION - PAIN TYPE: TYPE: ACUTE PAIN

## 2018-12-20 ASSESSMENT — PAIN SCALES - GENERAL: PAINLEVEL_OUTOF10: 8

## 2018-12-21 ENCOUNTER — CARE COORDINATION (OUTPATIENT)
Dept: CARE COORDINATION | Age: 78
End: 2018-12-21

## 2018-12-21 NOTE — ED PROVIDER NOTES
[address] ED  Emergency Department Encounter  Emergency Medicine Resident     Pt Name: Bismark Luz  MRN: [id number]  [place] 1940  Date of evaluation: 12/21/18  PCP:  Maudry Cogan, APRN - CNP    Chief Complaint     Chief Complaint   Patient presents with    Constipation     HISTORY OF PRESENT ILLNESS     Bismark Luz is a 77 y.o. female who presents with constipation x 4 days. Denies Nausea, vomiting, or diarrhea. The patient denies urinary symptoms. No rectal bleeding. Abdomen is soft, diffusely-tender, and non-distended. The patient is in no acute distress with even and unlabored respirations. REVIEW OF SYSTEMS       Constitutional: Denies recent fever, chills. Eyes: No visual changes. Neck: No midline neck pain  Respiratory: Denies recent shortness of breath. Cardiac:  Denies recent chest pain. GI: + abdominal pain but denies nausea or vomiting. Denies Blood in the stool or black tarry stools. + constipation  : denies dysuria. Musculoskeletal: Denies focal weakness. Neurologic: denies headache or focal weakness. Skin:  Denies any rash. PAST MEDICAL/SURGICAL/FAMILY HISTORY     PMH:  has a past medical history of Allergic rhinitis; CAD (coronary artery disease); Cholelithiasis; Chronic cough; Chronic cystitis; Colitis; GERD; Gout; Guillain-[place] ([address] Hyperlipidemia; Hypertension; Irritable bowel syndrome; Migraines; Multiple myeloma ([address] OA (osteoarthritis); Osteoarthritis; Osteopenia; Pneumonia; Post-menopausal; Pulmonary nodules; Raynaud's disease; S/P cardiac cath; Sleep apnea; Thyroid disease; Type 2 diabetes mellitus; and Type II or unspecified type diabetes mellitus without mention of complication, not stated as uncontrolled. Surgical History:  has a past surgical history that includes Knee arthroscopy (Right, 1993); Appendectomy (07/16/12); Colonoscopy (5/5/09); Upper gastrointestinal endoscopy (5/5/09);  Cholecystectomy (4/1988);

## 2019-01-01 ENCOUNTER — APPOINTMENT (OUTPATIENT)
Dept: CARDIAC REHAB | Age: 79
End: 2019-01-01
Payer: MEDICARE

## 2019-01-01 ENCOUNTER — HOSPITAL ENCOUNTER (OUTPATIENT)
Dept: CARDIAC REHAB | Age: 79
Setting detail: THERAPIES SERIES
Discharge: HOME | End: 2019-12-30
Payer: MEDICARE

## 2019-01-01 ENCOUNTER — HOSPITAL ENCOUNTER (OUTPATIENT)
Dept: CARDIAC REHAB | Age: 79
Setting detail: THERAPIES SERIES
Discharge: HOME | End: 2019-12-23
Payer: MEDICARE

## 2019-01-01 ENCOUNTER — HOSPITAL ENCOUNTER (OUTPATIENT)
Dept: CARDIAC REHAB | Age: 79
Setting detail: THERAPIES SERIES
Discharge: HOME | End: 2019-12-03
Payer: MEDICARE

## 2019-01-01 ENCOUNTER — HOSPITAL ENCOUNTER (OUTPATIENT)
Dept: CARDIAC REHAB | Age: 79
Setting detail: THERAPIES SERIES
Discharge: HOME | End: 2019-12-12
Payer: MEDICARE

## 2019-01-01 ENCOUNTER — HOSPITAL ENCOUNTER (OUTPATIENT)
Dept: CARDIAC REHAB | Age: 79
Setting detail: THERAPIES SERIES
Discharge: HOME | End: 2019-11-26
Payer: MEDICARE

## 2019-01-01 ENCOUNTER — OFFICE VISIT (OUTPATIENT)
Dept: PODIATRY | Age: 79
End: 2019-01-01
Payer: MEDICARE

## 2019-01-01 ENCOUNTER — HOSPITAL ENCOUNTER (OUTPATIENT)
Dept: CARDIAC REHAB | Age: 79
Setting detail: THERAPIES SERIES
Discharge: HOME | End: 2019-12-05
Payer: MEDICARE

## 2019-01-01 ENCOUNTER — HOSPITAL ENCOUNTER (OUTPATIENT)
Dept: CARDIAC REHAB | Age: 79
Setting detail: THERAPIES SERIES
Discharge: HOME | End: 2019-11-19
Payer: MEDICARE

## 2019-01-01 ENCOUNTER — HOSPITAL ENCOUNTER (OUTPATIENT)
Dept: CARDIAC REHAB | Age: 79
Setting detail: THERAPIES SERIES
Discharge: HOME | End: 2019-12-10
Payer: MEDICARE

## 2019-01-01 ENCOUNTER — HOSPITAL ENCOUNTER (OUTPATIENT)
Dept: CARDIAC REHAB | Age: 79
Setting detail: THERAPIES SERIES
Discharge: HOME | End: 2019-12-27
Payer: MEDICARE

## 2019-01-01 VITALS — BODY MASS INDEX: 40.22 KG/M2 | HEIGHT: 61 IN | WEIGHT: 213 LBS

## 2019-01-01 VITALS — BODY MASS INDEX: 41.19 KG/M2 | WEIGHT: 218 LBS

## 2019-01-01 VITALS — BODY MASS INDEX: 40.85 KG/M2 | WEIGHT: 216.2 LBS

## 2019-01-01 VITALS — WEIGHT: 224 LBS | BODY MASS INDEX: 42.32 KG/M2

## 2019-01-01 VITALS — BODY MASS INDEX: 40.7 KG/M2 | WEIGHT: 215.4 LBS

## 2019-01-01 VITALS — WEIGHT: 214.8 LBS | BODY MASS INDEX: 40.59 KG/M2

## 2019-01-01 VITALS — WEIGHT: 221.7 LBS | BODY MASS INDEX: 41.89 KG/M2

## 2019-01-01 DIAGNOSIS — D23.71 BENIGN NEOPLASM OF SKIN OF LOWER LIMB, INCLUDING HIP, RIGHT: ICD-10-CM

## 2019-01-01 DIAGNOSIS — B35.1 DERMATOPHYTOSIS OF NAIL: ICD-10-CM

## 2019-01-01 DIAGNOSIS — I73.9 PERIPHERAL VASCULAR DISORDER (HCC): ICD-10-CM

## 2019-01-01 DIAGNOSIS — E11.51 TYPE II DIABETES MELLITUS WITH PERIPHERAL CIRCULATORY DISORDER (HCC): Primary | ICD-10-CM

## 2019-01-01 DIAGNOSIS — M79.671 PAIN IN BOTH FEET: ICD-10-CM

## 2019-01-01 DIAGNOSIS — M79.672 PAIN IN BOTH FEET: ICD-10-CM

## 2019-01-01 DIAGNOSIS — D23.72 BENIGN NEOPLASM OF SKIN OF LOWER LIMB, INCLUDING HIP, LEFT: ICD-10-CM

## 2019-01-01 PROCEDURE — 93798 PHYS/QHP OP CAR RHAB W/ECG: CPT

## 2019-01-01 PROCEDURE — 11721 DEBRIDE NAIL 6 OR MORE: CPT | Performed by: PODIATRIST

## 2019-01-01 PROCEDURE — 17110 DESTRUCTION B9 LES UP TO 14: CPT | Performed by: PODIATRIST

## 2019-01-01 PROCEDURE — 99999 PR OFFICE/OUTPT VISIT,PROCEDURE ONLY: CPT | Performed by: PODIATRIST

## 2019-01-01 RX ORDER — ISOSORBIDE MONONITRATE 30 MG/1
30 TABLET, EXTENDED RELEASE ORAL DAILY
COMMUNITY
Start: 2019-10-29

## 2019-01-18 ENCOUNTER — APPOINTMENT (OUTPATIENT)
Dept: CT IMAGING | Age: 79
End: 2019-01-18
Payer: MEDICARE

## 2019-01-18 ENCOUNTER — HOSPITAL ENCOUNTER (EMERGENCY)
Age: 79
Discharge: ANOTHER HOSPITAL | End: 2019-01-18
Attending: EMERGENCY MEDICINE
Payer: MEDICARE

## 2019-01-18 VITALS
HEART RATE: 88 BPM | SYSTOLIC BLOOD PRESSURE: 165 MMHG | WEIGHT: 219 LBS | DIASTOLIC BLOOD PRESSURE: 72 MMHG | RESPIRATION RATE: 16 BRPM | HEIGHT: 61 IN | BODY MASS INDEX: 41.35 KG/M2 | OXYGEN SATURATION: 98 %

## 2019-01-18 DIAGNOSIS — K42.9 PERIUMBILICAL HERNIA: Primary | ICD-10-CM

## 2019-01-18 DIAGNOSIS — K56.609 SMALL BOWEL OBSTRUCTION (HCC): ICD-10-CM

## 2019-01-18 LAB
-: ABNORMAL
ABSOLUTE EOS #: 0.1 K/UL (ref 0–0.4)
ABSOLUTE IMMATURE GRANULOCYTE: ABNORMAL K/UL (ref 0–0.3)
ABSOLUTE LYMPH #: 1 K/UL (ref 1–4.8)
ABSOLUTE MONO #: 0.6 K/UL (ref 0.1–1.3)
ALBUMIN SERPL-MCNC: 3.7 G/DL (ref 3.5–5.2)
ALBUMIN/GLOBULIN RATIO: ABNORMAL (ref 1–2.5)
ALP BLD-CCNC: 135 U/L (ref 35–104)
ALT SERPL-CCNC: 21 U/L (ref 5–33)
AMORPHOUS: ABNORMAL
ANION GAP SERPL CALCULATED.3IONS-SCNC: 16 MMOL/L (ref 9–17)
AST SERPL-CCNC: 29 U/L
BACTERIA: ABNORMAL
BASOPHILS # BLD: 0 % (ref 0–2)
BASOPHILS ABSOLUTE: 0 K/UL (ref 0–0.2)
BILIRUB SERPL-MCNC: 0.99 MG/DL (ref 0.3–1.2)
BILIRUBIN URINE: NEGATIVE
BUN BLDV-MCNC: 43 MG/DL (ref 8–23)
BUN/CREAT BLD: ABNORMAL (ref 9–20)
CALCIUM SERPL-MCNC: 9.9 MG/DL (ref 8.6–10.4)
CASTS UA: ABNORMAL /LPF
CHLORIDE BLD-SCNC: 97 MMOL/L (ref 98–107)
CO2: 24 MMOL/L (ref 20–31)
COLOR: YELLOW
COMMENT UA: ABNORMAL
CREAT SERPL-MCNC: 1.63 MG/DL (ref 0.5–0.9)
CRYSTALS, UA: ABNORMAL /HPF
DIFFERENTIAL TYPE: ABNORMAL
EOSINOPHILS RELATIVE PERCENT: 1 % (ref 0–4)
EPITHELIAL CELLS UA: ABNORMAL /HPF
GFR AFRICAN AMERICAN: 37 ML/MIN
GFR NON-AFRICAN AMERICAN: 30 ML/MIN
GFR SERPL CREATININE-BSD FRML MDRD: ABNORMAL ML/MIN/{1.73_M2}
GFR SERPL CREATININE-BSD FRML MDRD: ABNORMAL ML/MIN/{1.73_M2}
GLUCOSE BLD-MCNC: 278 MG/DL (ref 70–99)
GLUCOSE URINE: NEGATIVE
HCT VFR BLD CALC: 38.7 % (ref 36–46)
HEMOGLOBIN: 12.4 G/DL (ref 12–16)
IMMATURE GRANULOCYTES: ABNORMAL %
KETONES, URINE: NEGATIVE
LACTIC ACID: 1.8 MMOL/L (ref 0.5–2.2)
LEUKOCYTE ESTERASE, URINE: NEGATIVE
LYMPHOCYTES # BLD: 13 % (ref 24–44)
MCH RBC QN AUTO: 32 PG (ref 26–34)
MCHC RBC AUTO-ENTMCNC: 32.1 G/DL (ref 31–37)
MCV RBC AUTO: 99.8 FL (ref 80–100)
MONOCYTES # BLD: 7 % (ref 1–7)
MUCUS: ABNORMAL
NITRITE, URINE: NEGATIVE
NRBC AUTOMATED: ABNORMAL PER 100 WBC
OTHER OBSERVATIONS UA: ABNORMAL
PDW BLD-RTO: 16.8 % (ref 11.5–14.9)
PH UA: 5 (ref 5–8)
PLATELET # BLD: 168 K/UL (ref 150–450)
PLATELET ESTIMATE: ABNORMAL
PMV BLD AUTO: 9.8 FL (ref 6–12)
POTASSIUM SERPL-SCNC: 4.4 MMOL/L (ref 3.7–5.3)
PROTEIN UA: ABNORMAL
RBC # BLD: 3.87 M/UL (ref 4–5.2)
RBC # BLD: ABNORMAL 10*6/UL
RBC UA: ABNORMAL /HPF
RENAL EPITHELIAL, UA: ABNORMAL /HPF
SEG NEUTROPHILS: 79 % (ref 36–66)
SEGMENTED NEUTROPHILS ABSOLUTE COUNT: 6.2 K/UL (ref 1.3–9.1)
SODIUM BLD-SCNC: 137 MMOL/L (ref 135–144)
SPECIFIC GRAVITY UA: 1.01 (ref 1–1.03)
TOTAL PROTEIN: 8.3 G/DL (ref 6.4–8.3)
TRICHOMONAS: ABNORMAL
TURBIDITY: ABNORMAL
URINE HGB: NEGATIVE
UROBILINOGEN, URINE: NORMAL
WBC # BLD: 7.8 K/UL (ref 3.5–11)
WBC # BLD: ABNORMAL 10*3/UL
WBC UA: ABNORMAL /HPF
YEAST: ABNORMAL

## 2019-01-18 PROCEDURE — 96374 THER/PROPH/DIAG INJ IV PUSH: CPT

## 2019-01-18 PROCEDURE — 83605 ASSAY OF LACTIC ACID: CPT

## 2019-01-18 PROCEDURE — 36415 COLL VENOUS BLD VENIPUNCTURE: CPT

## 2019-01-18 PROCEDURE — 99285 EMERGENCY DEPT VISIT HI MDM: CPT

## 2019-01-18 PROCEDURE — 81001 URINALYSIS AUTO W/SCOPE: CPT

## 2019-01-18 PROCEDURE — 2580000003 HC RX 258: Performed by: EMERGENCY MEDICINE

## 2019-01-18 PROCEDURE — 6360000002 HC RX W HCPCS: Performed by: EMERGENCY MEDICINE

## 2019-01-18 PROCEDURE — 74176 CT ABD & PELVIS W/O CONTRAST: CPT

## 2019-01-18 PROCEDURE — 80053 COMPREHEN METABOLIC PANEL: CPT

## 2019-01-18 PROCEDURE — 87086 URINE CULTURE/COLONY COUNT: CPT

## 2019-01-18 PROCEDURE — 85025 COMPLETE CBC W/AUTO DIFF WBC: CPT

## 2019-01-18 PROCEDURE — 96375 TX/PRO/DX INJ NEW DRUG ADDON: CPT

## 2019-01-18 PROCEDURE — 96376 TX/PRO/DX INJ SAME DRUG ADON: CPT

## 2019-01-18 RX ORDER — MORPHINE SULFATE 4 MG/ML
4 INJECTION, SOLUTION INTRAMUSCULAR; INTRAVENOUS ONCE
Status: COMPLETED | OUTPATIENT
Start: 2019-01-18 | End: 2019-01-18

## 2019-01-18 RX ORDER — 0.9 % SODIUM CHLORIDE 0.9 %
1000 INTRAVENOUS SOLUTION INTRAVENOUS ONCE
Status: COMPLETED | OUTPATIENT
Start: 2019-01-18 | End: 2019-01-18

## 2019-01-18 RX ORDER — ONDANSETRON 2 MG/ML
8 INJECTION INTRAMUSCULAR; INTRAVENOUS ONCE
Status: COMPLETED | OUTPATIENT
Start: 2019-01-18 | End: 2019-01-18

## 2019-01-18 RX ADMIN — MORPHINE SULFATE 4 MG: 4 INJECTION INTRAVENOUS at 15:50

## 2019-01-18 RX ADMIN — MORPHINE SULFATE 4 MG: 4 INJECTION INTRAVENOUS at 14:32

## 2019-01-18 RX ADMIN — SODIUM CHLORIDE 1000 ML: 9 INJECTION, SOLUTION INTRAVENOUS at 14:33

## 2019-01-18 RX ADMIN — ONDANSETRON 8 MG: 2 INJECTION INTRAMUSCULAR; INTRAVENOUS at 17:05

## 2019-01-18 ASSESSMENT — ENCOUNTER SYMPTOMS
DIARRHEA: 0
CONSTIPATION: 1
EYE PAIN: 0
EYE REDNESS: 0
CHEST TIGHTNESS: 0
VOMITING: 0
COUGH: 0
BACK PAIN: 0
SORE THROAT: 0
ABDOMINAL PAIN: 1
NAUSEA: 1
SHORTNESS OF BREATH: 0

## 2019-01-18 ASSESSMENT — PAIN SCALES - GENERAL
PAINLEVEL_OUTOF10: 4
PAINLEVEL_OUTOF10: 4
PAINLEVEL_OUTOF10: 8
PAINLEVEL_OUTOF10: 9

## 2019-01-18 ASSESSMENT — PAIN DESCRIPTION - LOCATION
LOCATION: ABDOMEN;VAGINA;PELVIS
LOCATION: ABDOMEN

## 2019-01-18 ASSESSMENT — PAIN DESCRIPTION - ORIENTATION
ORIENTATION: RIGHT
ORIENTATION: RIGHT

## 2019-01-18 ASSESSMENT — PAIN - FUNCTIONAL ASSESSMENT: PAIN_FUNCTIONAL_ASSESSMENT: 0-10

## 2019-01-18 ASSESSMENT — PAIN DESCRIPTION - PAIN TYPE: TYPE: ACUTE PAIN

## 2019-01-19 LAB
CULTURE: NO GROWTH
Lab: NORMAL
SPECIMEN DESCRIPTION: NORMAL
STATUS: NORMAL

## 2019-03-11 ENCOUNTER — OFFICE VISIT (OUTPATIENT)
Dept: PODIATRY | Age: 79
End: 2019-03-11
Payer: MEDICARE

## 2019-03-11 DIAGNOSIS — D23.71 BENIGN NEOPLASM OF SKIN OF LOWER LIMB, INCLUDING HIP, RIGHT: ICD-10-CM

## 2019-03-11 DIAGNOSIS — D23.72 BENIGN NEOPLASM OF SKIN OF LOWER LIMB, INCLUDING HIP, LEFT: ICD-10-CM

## 2019-03-11 DIAGNOSIS — M79.671 PAIN IN BOTH FEET: Primary | ICD-10-CM

## 2019-03-11 DIAGNOSIS — E11.51 TYPE II DIABETES MELLITUS WITH PERIPHERAL CIRCULATORY DISORDER (HCC): ICD-10-CM

## 2019-03-11 DIAGNOSIS — I73.9 PVD (PERIPHERAL VASCULAR DISEASE) (HCC): ICD-10-CM

## 2019-03-11 DIAGNOSIS — B35.1 DERMATOPHYTOSIS OF NAIL: ICD-10-CM

## 2019-03-11 DIAGNOSIS — M79.672 PAIN IN BOTH FEET: Primary | ICD-10-CM

## 2019-03-11 PROCEDURE — G8484 FLU IMMUNIZE NO ADMIN: HCPCS | Performed by: PODIATRIST

## 2019-03-11 PROCEDURE — 4040F PNEUMOC VAC/ADMIN/RCVD: CPT | Performed by: PODIATRIST

## 2019-03-11 PROCEDURE — 1123F ACP DISCUSS/DSCN MKR DOCD: CPT | Performed by: PODIATRIST

## 2019-03-11 PROCEDURE — G8399 PT W/DXA RESULTS DOCUMENT: HCPCS | Performed by: PODIATRIST

## 2019-03-11 PROCEDURE — 17110 DESTRUCTION B9 LES UP TO 14: CPT | Performed by: PODIATRIST

## 2019-03-11 PROCEDURE — 11721 DEBRIDE NAIL 6 OR MORE: CPT | Performed by: PODIATRIST

## 2019-03-11 PROCEDURE — 1036F TOBACCO NON-USER: CPT | Performed by: PODIATRIST

## 2019-03-11 PROCEDURE — G8417 CALC BMI ABV UP PARAM F/U: HCPCS | Performed by: PODIATRIST

## 2019-03-11 PROCEDURE — G8598 ASA/ANTIPLAT THER USED: HCPCS | Performed by: PODIATRIST

## 2019-03-11 PROCEDURE — 1101F PT FALLS ASSESS-DOCD LE1/YR: CPT | Performed by: PODIATRIST

## 2019-03-11 PROCEDURE — 1090F PRES/ABSN URINE INCON ASSESS: CPT | Performed by: PODIATRIST

## 2019-03-11 PROCEDURE — 99213 OFFICE O/P EST LOW 20 MIN: CPT | Performed by: PODIATRIST

## 2019-03-11 PROCEDURE — G8428 CUR MEDS NOT DOCUMENT: HCPCS | Performed by: PODIATRIST

## 2019-03-18 ENCOUNTER — HOSPITAL ENCOUNTER (OUTPATIENT)
Age: 79
Discharge: HOME | End: 2019-03-18
Payer: MEDICARE

## 2019-03-18 DIAGNOSIS — N18.30 SECONDARY DIABETES MELLITUS WITH STAGE 3 CHRONIC KIDNEY DISEASE (GFR 30-59) (HCC): ICD-10-CM

## 2019-03-18 DIAGNOSIS — N18.9 CHRONIC RENAL IMPAIRMENT, UNSPECIFIED CKD STAGE: ICD-10-CM

## 2019-03-18 DIAGNOSIS — E55.9 VITAMIN D DEFICIENCY: ICD-10-CM

## 2019-03-18 DIAGNOSIS — E03.9 HYPOTHYROIDISM, UNSPECIFIED TYPE: ICD-10-CM

## 2019-03-18 DIAGNOSIS — E13.22 SECONDARY DIABETES MELLITUS WITH STAGE 3 CHRONIC KIDNEY DISEASE (GFR 30-59) (HCC): ICD-10-CM

## 2019-03-18 DIAGNOSIS — N18.30 ANEMIA IN STAGE 3 CHRONIC KIDNEY DISEASE (HCC): ICD-10-CM

## 2019-03-18 DIAGNOSIS — I12.9 BENIGN HYPERTENSION WITH CKD (CHRONIC KIDNEY DISEASE) STAGE III (HCC): ICD-10-CM

## 2019-03-18 DIAGNOSIS — D63.1 ANEMIA IN STAGE 3 CHRONIC KIDNEY DISEASE (HCC): ICD-10-CM

## 2019-03-18 DIAGNOSIS — N18.30 CKD (CHRONIC KIDNEY DISEASE) STAGE 3, GFR 30-59 ML/MIN (HCC): ICD-10-CM

## 2019-03-18 DIAGNOSIS — N18.30 BENIGN HYPERTENSION WITH CKD (CHRONIC KIDNEY DISEASE) STAGE III (HCC): ICD-10-CM

## 2019-03-18 LAB
ABSOLUTE EOS #: 0.1 K/UL (ref 0–0.4)
ABSOLUTE IMMATURE GRANULOCYTE: ABNORMAL K/UL (ref 0–0.3)
ABSOLUTE LYMPH #: 1.3 K/UL (ref 1–4.8)
ABSOLUTE MONO #: 0.5 K/UL (ref 0.1–1.3)
ALBUMIN SERPL-MCNC: 4 G/DL (ref 3.5–5.2)
ALBUMIN/GLOBULIN RATIO: ABNORMAL (ref 1–2.5)
ALP BLD-CCNC: 116 U/L (ref 35–104)
ALT SERPL-CCNC: 13 U/L (ref 5–33)
ANION GAP SERPL CALCULATED.3IONS-SCNC: 12 MMOL/L (ref 9–17)
ANION GAP SERPL CALCULATED.3IONS-SCNC: 13 MMOL/L (ref 9–17)
AST SERPL-CCNC: 30 U/L
BASOPHILS # BLD: 0 % (ref 0–2)
BASOPHILS ABSOLUTE: 0 K/UL (ref 0–0.2)
BILIRUB SERPL-MCNC: 0.41 MG/DL (ref 0.3–1.2)
BUN BLDV-MCNC: 26 MG/DL (ref 8–23)
BUN BLDV-MCNC: 27 MG/DL (ref 8–23)
BUN/CREAT BLD: ABNORMAL (ref 9–20)
CALCIUM SERPL-MCNC: 10 MG/DL (ref 8.6–10.4)
CALCIUM SERPL-MCNC: 10.1 MG/DL (ref 8.6–10.4)
CHLORIDE BLD-SCNC: 100 MMOL/L (ref 98–107)
CHLORIDE BLD-SCNC: 100 MMOL/L (ref 98–107)
CO2: 28 MMOL/L (ref 20–31)
CO2: 29 MMOL/L (ref 20–31)
CREAT SERPL-MCNC: 1.24 MG/DL (ref 0.5–0.9)
CREAT SERPL-MCNC: 1.36 MG/DL (ref 0.5–0.9)
DIFFERENTIAL TYPE: ABNORMAL
EOSINOPHILS RELATIVE PERCENT: 2 % (ref 0–4)
GFR AFRICAN AMERICAN: 45 ML/MIN
GFR AFRICAN AMERICAN: 51 ML/MIN
GFR NON-AFRICAN AMERICAN: 38 ML/MIN
GFR NON-AFRICAN AMERICAN: 42 ML/MIN
GFR SERPL CREATININE-BSD FRML MDRD: ABNORMAL ML/MIN/{1.73_M2}
GLUCOSE BLD-MCNC: 117 MG/DL (ref 70–99)
HCT VFR BLD CALC: 34.7 % (ref 36–46)
HCT VFR BLD CALC: 34.7 % (ref 36–46)
HEMOGLOBIN: 11.1 G/DL (ref 12–16)
HEMOGLOBIN: 11.1 G/DL (ref 12–16)
IMMATURE GRANULOCYTES: ABNORMAL %
LYMPHOCYTES # BLD: 21 % (ref 24–44)
MAGNESIUM: 1.3 MG/DL (ref 1.6–2.6)
MCH RBC QN AUTO: 31.5 PG (ref 26–34)
MCH RBC QN AUTO: 31.5 PG (ref 26–34)
MCHC RBC AUTO-ENTMCNC: 32 G/DL (ref 31–37)
MCHC RBC AUTO-ENTMCNC: 32 G/DL (ref 31–37)
MCV RBC AUTO: 98.3 FL (ref 80–100)
MCV RBC AUTO: 98.3 FL (ref 80–100)
MONOCYTES # BLD: 8 % (ref 1–7)
NRBC AUTOMATED: ABNORMAL PER 100 WBC
NRBC AUTOMATED: ABNORMAL PER 100 WBC
PDW BLD-RTO: 15.8 % (ref 11.5–14.9)
PDW BLD-RTO: 15.8 % (ref 11.5–14.9)
PHOSPHORUS: 2.9 MG/DL (ref 2.6–4.5)
PLATELET # BLD: 194 K/UL (ref 150–450)
PLATELET # BLD: 194 K/UL (ref 150–450)
PLATELET ESTIMATE: ABNORMAL
PMV BLD AUTO: 7.5 FL (ref 6–12)
PMV BLD AUTO: 7.5 FL (ref 6–12)
POTASSIUM SERPL-SCNC: 3.8 MMOL/L (ref 3.7–5.3)
POTASSIUM SERPL-SCNC: 3.8 MMOL/L (ref 3.7–5.3)
RBC # BLD: 3.53 M/UL (ref 4–5.2)
RBC # BLD: 3.53 M/UL (ref 4–5.2)
RBC # BLD: ABNORMAL 10*6/UL
SEG NEUTROPHILS: 69 % (ref 36–66)
SEGMENTED NEUTROPHILS ABSOLUTE COUNT: 4.4 K/UL (ref 1.3–9.1)
SODIUM BLD-SCNC: 141 MMOL/L (ref 135–144)
SODIUM BLD-SCNC: 141 MMOL/L (ref 135–144)
THYROXINE, FREE: 1.66 NG/DL (ref 0.93–1.7)
TOTAL PROTEIN: 7.8 G/DL (ref 6.4–8.3)
TSH SERPL DL<=0.05 MIU/L-ACNC: 1.55 MIU/L (ref 0.3–5)
VITAMIN D 25-HYDROXY: 43.4 NG/ML (ref 30–100)
VITAMIN D 25-HYDROXY: 43.4 NG/ML (ref 30–100)
WBC # BLD: 6.4 K/UL (ref 3.5–11)
WBC # BLD: 6.4 K/UL (ref 3.5–11)
WBC # BLD: ABNORMAL 10*3/UL

## 2019-03-18 PROCEDURE — 82310 ASSAY OF CALCIUM: CPT

## 2019-03-18 PROCEDURE — 82306 VITAMIN D 25 HYDROXY: CPT

## 2019-03-18 PROCEDURE — 84100 ASSAY OF PHOSPHORUS: CPT

## 2019-03-18 PROCEDURE — 85027 COMPLETE CBC AUTOMATED: CPT

## 2019-03-18 PROCEDURE — 82565 ASSAY OF CREATININE: CPT

## 2019-03-18 PROCEDURE — 80053 COMPREHEN METABOLIC PANEL: CPT

## 2019-03-18 PROCEDURE — 84443 ASSAY THYROID STIM HORMONE: CPT

## 2019-03-18 PROCEDURE — 84439 ASSAY OF FREE THYROXINE: CPT

## 2019-03-18 PROCEDURE — 36415 COLL VENOUS BLD VENIPUNCTURE: CPT

## 2019-03-18 PROCEDURE — 85025 COMPLETE CBC W/AUTO DIFF WBC: CPT

## 2019-03-18 PROCEDURE — 83735 ASSAY OF MAGNESIUM: CPT

## 2019-03-18 PROCEDURE — 84520 ASSAY OF UREA NITROGEN: CPT

## 2019-03-18 PROCEDURE — 80051 ELECTROLYTE PANEL: CPT

## 2019-05-02 ENCOUNTER — HOSPITAL ENCOUNTER (OUTPATIENT)
Age: 79
Discharge: HOME | End: 2019-05-02
Payer: MEDICARE

## 2019-05-02 DIAGNOSIS — N18.30 BENIGN HYPERTENSION WITH CKD (CHRONIC KIDNEY DISEASE) STAGE III (HCC): ICD-10-CM

## 2019-05-02 DIAGNOSIS — E13.22 SECONDARY DIABETES MELLITUS WITH STAGE 3 CHRONIC KIDNEY DISEASE (GFR 30-59) (HCC): ICD-10-CM

## 2019-05-02 DIAGNOSIS — E83.42 HYPOMAGNESEMIA: ICD-10-CM

## 2019-05-02 DIAGNOSIS — D63.1 ANEMIA IN STAGE 3 CHRONIC KIDNEY DISEASE (HCC): ICD-10-CM

## 2019-05-02 DIAGNOSIS — N18.30 ANEMIA IN STAGE 3 CHRONIC KIDNEY DISEASE (HCC): ICD-10-CM

## 2019-05-02 DIAGNOSIS — I12.9 BENIGN HYPERTENSION WITH CKD (CHRONIC KIDNEY DISEASE) STAGE III (HCC): ICD-10-CM

## 2019-05-02 DIAGNOSIS — N18.30 SECONDARY DIABETES MELLITUS WITH STAGE 3 CHRONIC KIDNEY DISEASE (GFR 30-59) (HCC): ICD-10-CM

## 2019-05-02 DIAGNOSIS — N18.30 CKD (CHRONIC KIDNEY DISEASE) STAGE 3, GFR 30-59 ML/MIN (HCC): ICD-10-CM

## 2019-05-02 LAB — MAGNESIUM: 2.1 MG/DL (ref 1.6–2.6)

## 2019-05-02 PROCEDURE — 36415 COLL VENOUS BLD VENIPUNCTURE: CPT

## 2019-05-02 PROCEDURE — 83735 ASSAY OF MAGNESIUM: CPT

## 2019-05-09 ENCOUNTER — APPOINTMENT (OUTPATIENT)
Dept: GENERAL RADIOLOGY | Age: 79
DRG: 281 | End: 2019-05-09
Payer: MEDICARE

## 2019-05-09 ENCOUNTER — APPOINTMENT (OUTPATIENT)
Dept: NUCLEAR MEDICINE | Age: 79
DRG: 281 | End: 2019-05-09
Payer: MEDICARE

## 2019-05-09 ENCOUNTER — HOSPITAL ENCOUNTER (INPATIENT)
Age: 79
LOS: 1 days | Discharge: ANOTHER HOSPITAL | DRG: 281 | End: 2019-05-10
Attending: EMERGENCY MEDICINE | Admitting: FAMILY MEDICINE
Payer: MEDICARE

## 2019-05-09 DIAGNOSIS — I48.91 ATRIAL FIBRILLATION, UNSPECIFIED TYPE (HCC): ICD-10-CM

## 2019-05-09 DIAGNOSIS — R07.9 CHEST PAIN, UNSPECIFIED TYPE: Primary | ICD-10-CM

## 2019-05-09 PROBLEM — E66.9 OBESITY, CLASS II, BMI 35-39.9: Status: ACTIVE | Noted: 2019-05-09

## 2019-05-09 LAB
ABSOLUTE EOS #: 0.1 K/UL (ref 0–0.4)
ABSOLUTE IMMATURE GRANULOCYTE: ABNORMAL K/UL (ref 0–0.3)
ABSOLUTE LYMPH #: 1.2 K/UL (ref 1–4.8)
ABSOLUTE MONO #: 0.6 K/UL (ref 0.1–1.3)
ALBUMIN SERPL-MCNC: 3.9 G/DL (ref 3.5–5.2)
ALBUMIN/GLOBULIN RATIO: ABNORMAL (ref 1–2.5)
ALP BLD-CCNC: 136 U/L (ref 35–104)
ALT SERPL-CCNC: 20 U/L (ref 5–33)
ANION GAP SERPL CALCULATED.3IONS-SCNC: 12 MMOL/L (ref 9–17)
ANTI-XA UNFRAC HEPARIN: 0.27 IU/L (ref 0.3–0.7)
ANTI-XA UNFRAC HEPARIN: 0.8 IU/L (ref 0.3–0.7)
AST SERPL-CCNC: 29 U/L
BASOPHILS # BLD: 0 % (ref 0–2)
BASOPHILS ABSOLUTE: 0 K/UL (ref 0–0.2)
BILIRUB SERPL-MCNC: 0.35 MG/DL (ref 0.3–1.2)
BUN BLDV-MCNC: 31 MG/DL (ref 8–23)
BUN/CREAT BLD: ABNORMAL (ref 9–20)
CALCIUM SERPL-MCNC: 9.8 MG/DL (ref 8.6–10.4)
CHLORIDE BLD-SCNC: 94 MMOL/L (ref 98–107)
CO2: 27 MMOL/L (ref 20–31)
CREAT SERPL-MCNC: 1.6 MG/DL (ref 0.5–0.9)
DIFFERENTIAL TYPE: ABNORMAL
EOSINOPHILS RELATIVE PERCENT: 2 % (ref 0–4)
GFR AFRICAN AMERICAN: 38 ML/MIN
GFR NON-AFRICAN AMERICAN: 31 ML/MIN
GFR SERPL CREATININE-BSD FRML MDRD: ABNORMAL ML/MIN/{1.73_M2}
GFR SERPL CREATININE-BSD FRML MDRD: ABNORMAL ML/MIN/{1.73_M2}
GLUCOSE BLD-MCNC: 139 MG/DL (ref 65–105)
GLUCOSE BLD-MCNC: 140 MG/DL (ref 70–99)
GLUCOSE BLD-MCNC: 213 MG/DL (ref 65–105)
GLUCOSE BLD-MCNC: 329 MG/DL (ref 65–105)
HCT VFR BLD CALC: 35.3 % (ref 36–46)
HCT VFR BLD CALC: 36.3 % (ref 36–46)
HEMOGLOBIN: 11.5 G/DL (ref 12–16)
HEMOGLOBIN: 11.8 G/DL (ref 12–16)
IMMATURE GRANULOCYTES: ABNORMAL %
INR BLD: 1.1
INR BLD: 1.2
LV EF: 55 %
LVEF MODALITY: NORMAL
LYMPHOCYTES # BLD: 26 % (ref 24–44)
MCH RBC QN AUTO: 31.5 PG (ref 26–34)
MCH RBC QN AUTO: 32 PG (ref 26–34)
MCHC RBC AUTO-ENTMCNC: 32.5 G/DL (ref 31–37)
MCHC RBC AUTO-ENTMCNC: 32.6 G/DL (ref 31–37)
MCV RBC AUTO: 96.5 FL (ref 80–100)
MCV RBC AUTO: 98.3 FL (ref 80–100)
MONOCYTES # BLD: 12 % (ref 1–7)
NRBC AUTOMATED: ABNORMAL PER 100 WBC
NRBC AUTOMATED: ABNORMAL PER 100 WBC
PARTIAL THROMBOPLASTIN TIME: 44.2 SEC (ref 24–36)
PARTIAL THROMBOPLASTIN TIME: 53 SEC (ref 24–36)
PDW BLD-RTO: 16.2 % (ref 11.5–14.9)
PDW BLD-RTO: 16.3 % (ref 11.5–14.9)
PLATELET # BLD: 155 K/UL (ref 150–450)
PLATELET # BLD: 164 K/UL (ref 150–450)
PLATELET ESTIMATE: ABNORMAL
PMV BLD AUTO: 8.5 FL (ref 6–12)
PMV BLD AUTO: 8.5 FL (ref 6–12)
POTASSIUM SERPL-SCNC: 4.8 MMOL/L (ref 3.7–5.3)
PROTHROMBIN TIME: 14.4 SEC (ref 11.8–14.6)
PROTHROMBIN TIME: 14.7 SEC (ref 11.8–14.6)
RBC # BLD: 3.66 M/UL (ref 4–5.2)
RBC # BLD: 3.69 M/UL (ref 4–5.2)
RBC # BLD: ABNORMAL 10*6/UL
SEG NEUTROPHILS: 60 % (ref 36–66)
SEGMENTED NEUTROPHILS ABSOLUTE COUNT: 2.9 K/UL (ref 1.3–9.1)
SODIUM BLD-SCNC: 133 MMOL/L (ref 135–144)
TOTAL PROTEIN: 7.6 G/DL (ref 6.4–8.3)
TROPONIN INTERP: ABNORMAL
TROPONIN INTERP: ABNORMAL
TROPONIN T: ABNORMAL NG/ML
TROPONIN T: ABNORMAL NG/ML
TROPONIN, HIGH SENSITIVITY: 28 NG/L (ref 0–14)
TROPONIN, HIGH SENSITIVITY: 30 NG/L (ref 0–14)
WBC # BLD: 4.1 K/UL (ref 3.5–11)
WBC # BLD: 4.9 K/UL (ref 3.5–11)
WBC # BLD: ABNORMAL 10*3/UL

## 2019-05-09 PROCEDURE — 80053 COMPREHEN METABOLIC PANEL: CPT

## 2019-05-09 PROCEDURE — A9500 TC99M SESTAMIBI: HCPCS | Performed by: FAMILY MEDICINE

## 2019-05-09 PROCEDURE — 2060000000 HC ICU INTERMEDIATE R&B

## 2019-05-09 PROCEDURE — 99223 1ST HOSP IP/OBS HIGH 75: CPT | Performed by: FAMILY MEDICINE

## 2019-05-09 PROCEDURE — 6360000002 HC RX W HCPCS: Performed by: INTERNAL MEDICINE

## 2019-05-09 PROCEDURE — 6360000002 HC RX W HCPCS: Performed by: FAMILY MEDICINE

## 2019-05-09 PROCEDURE — 93306 TTE W/DOPPLER COMPLETE: CPT

## 2019-05-09 PROCEDURE — 85610 PROTHROMBIN TIME: CPT

## 2019-05-09 PROCEDURE — 2580000003 HC RX 258: Performed by: FAMILY MEDICINE

## 2019-05-09 PROCEDURE — 3430000000 HC RX DIAGNOSTIC RADIOPHARMACEUTICAL: Performed by: FAMILY MEDICINE

## 2019-05-09 PROCEDURE — 84484 ASSAY OF TROPONIN QUANT: CPT

## 2019-05-09 PROCEDURE — 85730 THROMBOPLASTIN TIME PARTIAL: CPT

## 2019-05-09 PROCEDURE — 82947 ASSAY GLUCOSE BLOOD QUANT: CPT

## 2019-05-09 PROCEDURE — 85027 COMPLETE CBC AUTOMATED: CPT

## 2019-05-09 PROCEDURE — 85025 COMPLETE CBC W/AUTO DIFF WBC: CPT

## 2019-05-09 PROCEDURE — 36415 COLL VENOUS BLD VENIPUNCTURE: CPT

## 2019-05-09 PROCEDURE — 85520 HEPARIN ASSAY: CPT

## 2019-05-09 PROCEDURE — 99285 EMERGENCY DEPT VISIT HI MDM: CPT

## 2019-05-09 PROCEDURE — 78452 HT MUSCLE IMAGE SPECT MULT: CPT

## 2019-05-09 PROCEDURE — 93005 ELECTROCARDIOGRAM TRACING: CPT

## 2019-05-09 PROCEDURE — 6370000000 HC RX 637 (ALT 250 FOR IP): Performed by: FAMILY MEDICINE

## 2019-05-09 PROCEDURE — 71045 X-RAY EXAM CHEST 1 VIEW: CPT

## 2019-05-09 RX ORDER — SODIUM CHLORIDE 0.9 % (FLUSH) 0.9 %
10 SYRINGE (ML) INJECTION PRN
Status: DISCONTINUED | OUTPATIENT
Start: 2019-05-09 | End: 2019-05-10 | Stop reason: SDUPTHER

## 2019-05-09 RX ORDER — ONDANSETRON 4 MG/1
4 TABLET, ORALLY DISINTEGRATING ORAL EVERY 8 HOURS PRN
Status: DISCONTINUED | OUTPATIENT
Start: 2019-05-09 | End: 2019-05-09 | Stop reason: SDUPTHER

## 2019-05-09 RX ORDER — ERGOCALCIFEROL 1.25 MG/1
50000 CAPSULE ORAL WEEKLY
Status: DISCONTINUED | OUTPATIENT
Start: 2019-05-09 | End: 2019-05-10 | Stop reason: SDUPTHER

## 2019-05-09 RX ORDER — NICOTINE POLACRILEX 4 MG
15 LOZENGE BUCCAL PRN
Status: DISCONTINUED | OUTPATIENT
Start: 2019-05-09 | End: 2019-05-10 | Stop reason: HOSPADM

## 2019-05-09 RX ORDER — AMINOPHYLLINE DIHYDRATE 25 MG/ML
50 INJECTION, SOLUTION INTRAVENOUS PRN
Status: ACTIVE | OUTPATIENT
Start: 2019-05-09 | End: 2019-05-09

## 2019-05-09 RX ORDER — DEXTROSE MONOHYDRATE 50 MG/ML
100 INJECTION, SOLUTION INTRAVENOUS PRN
Status: DISCONTINUED | OUTPATIENT
Start: 2019-05-09 | End: 2019-05-10 | Stop reason: HOSPADM

## 2019-05-09 RX ORDER — CARVEDILOL 3.12 MG/1
3.12 TABLET ORAL 2 TIMES DAILY
COMMUNITY
End: 2019-05-17

## 2019-05-09 RX ORDER — SODIUM CHLORIDE 0.9 % (FLUSH) 0.9 %
10 SYRINGE (ML) INJECTION PRN
Status: DISCONTINUED | OUTPATIENT
Start: 2019-05-09 | End: 2019-05-10 | Stop reason: HOSPADM

## 2019-05-09 RX ORDER — ALBUTEROL SULFATE 90 UG/1
2 AEROSOL, METERED RESPIRATORY (INHALATION) EVERY 6 HOURS PRN
Status: DISCONTINUED | OUTPATIENT
Start: 2019-05-09 | End: 2019-05-10 | Stop reason: HOSPADM

## 2019-05-09 RX ORDER — ANTIARTHRITIC COMBINATION NO.2 900 MG
5000 TABLET ORAL DAILY
Status: DISCONTINUED | OUTPATIENT
Start: 2019-05-09 | End: 2019-05-09 | Stop reason: ALTCHOICE

## 2019-05-09 RX ORDER — ACYCLOVIR 200 MG/1
200 CAPSULE ORAL 2 TIMES DAILY
Status: DISCONTINUED | OUTPATIENT
Start: 2019-05-09 | End: 2019-05-10 | Stop reason: SDUPTHER

## 2019-05-09 RX ORDER — TORSEMIDE 20 MG/1
20 TABLET ORAL DAILY
Status: DISCONTINUED | OUTPATIENT
Start: 2019-05-09 | End: 2019-05-09

## 2019-05-09 RX ORDER — SODIUM CHLORIDE 0.9 % (FLUSH) 0.9 %
10 SYRINGE (ML) INJECTION PRN
Status: DISCONTINUED | OUTPATIENT
Start: 2019-05-09 | End: 2019-05-09 | Stop reason: SDUPTHER

## 2019-05-09 RX ORDER — CARVEDILOL 3.12 MG/1
3.12 TABLET ORAL 2 TIMES DAILY
Status: DISCONTINUED | OUTPATIENT
Start: 2019-05-09 | End: 2019-05-10 | Stop reason: SDUPTHER

## 2019-05-09 RX ORDER — VITAMIN C
1 TAB ORAL DAILY
Status: DISCONTINUED | OUTPATIENT
Start: 2019-05-09 | End: 2019-05-10 | Stop reason: HOSPADM

## 2019-05-09 RX ORDER — CARVEDILOL 3.12 MG/1
3.12 TABLET ORAL 2 TIMES DAILY WITH MEALS
Status: DISCONTINUED | OUTPATIENT
Start: 2019-05-09 | End: 2019-05-09

## 2019-05-09 RX ORDER — NITROGLYCERIN 0.4 MG/1
0.4 TABLET SUBLINGUAL EVERY 5 MIN PRN
Status: DISCONTINUED | OUTPATIENT
Start: 2019-05-09 | End: 2019-05-10 | Stop reason: SDUPTHER

## 2019-05-09 RX ORDER — HEPARIN SODIUM 5000 [USP'U]/ML
4000 INJECTION, SOLUTION INTRAVENOUS; SUBCUTANEOUS ONCE
Status: COMPLETED | OUTPATIENT
Start: 2019-05-09 | End: 2019-05-09

## 2019-05-09 RX ORDER — HEPARIN SODIUM 10000 [USP'U]/100ML
12 INJECTION, SOLUTION INTRAVENOUS CONTINUOUS
Status: DISCONTINUED | OUTPATIENT
Start: 2019-05-09 | End: 2019-05-10 | Stop reason: HOSPADM

## 2019-05-09 RX ORDER — SODIUM CHLORIDE 0.9 % (FLUSH) 0.9 %
10 SYRINGE (ML) INJECTION EVERY 12 HOURS SCHEDULED
Status: DISCONTINUED | OUTPATIENT
Start: 2019-05-09 | End: 2019-05-10 | Stop reason: HOSPADM

## 2019-05-09 RX ORDER — LANOLIN ALCOHOL/MO/W.PET/CERES
50 CREAM (GRAM) TOPICAL DAILY
Status: DISCONTINUED | OUTPATIENT
Start: 2019-05-10 | End: 2019-05-10 | Stop reason: HOSPADM

## 2019-05-09 RX ORDER — PANTOPRAZOLE SODIUM 20 MG/1
20 TABLET, DELAYED RELEASE ORAL DAILY
Status: DISCONTINUED | OUTPATIENT
Start: 2019-05-09 | End: 2019-05-10 | Stop reason: SDUPTHER

## 2019-05-09 RX ORDER — ONDANSETRON 4 MG/1
8 TABLET, FILM COATED ORAL EVERY 8 HOURS PRN
Status: DISCONTINUED | OUTPATIENT
Start: 2019-05-09 | End: 2019-05-10 | Stop reason: HOSPADM

## 2019-05-09 RX ORDER — ACETAMINOPHEN 325 MG/1
650 TABLET ORAL EVERY 4 HOURS PRN
Status: DISCONTINUED | OUTPATIENT
Start: 2019-05-09 | End: 2019-05-10 | Stop reason: HOSPADM

## 2019-05-09 RX ORDER — HEPARIN SODIUM 5000 [USP'U]/ML
4000 INJECTION, SOLUTION INTRAVENOUS; SUBCUTANEOUS PRN
Status: DISCONTINUED | OUTPATIENT
Start: 2019-05-09 | End: 2019-05-10 | Stop reason: HOSPADM

## 2019-05-09 RX ORDER — TORSEMIDE 20 MG/1
20 TABLET ORAL
Status: DISCONTINUED | OUTPATIENT
Start: 2019-05-09 | End: 2019-05-09

## 2019-05-09 RX ORDER — TORSEMIDE 20 MG/1
20 TABLET ORAL
COMMUNITY
End: 2019-08-13

## 2019-05-09 RX ORDER — TORSEMIDE 20 MG/1
40 TABLET ORAL
Status: DISCONTINUED | OUTPATIENT
Start: 2019-05-10 | End: 2019-05-10 | Stop reason: SDUPTHER

## 2019-05-09 RX ORDER — DEXTROSE MONOHYDRATE 25 G/50ML
12.5 INJECTION, SOLUTION INTRAVENOUS PRN
Status: DISCONTINUED | OUTPATIENT
Start: 2019-05-09 | End: 2019-05-10 | Stop reason: HOSPADM

## 2019-05-09 RX ORDER — ATROPINE SULFATE 0.1 MG/ML
0.5 INJECTION INTRAVENOUS EVERY 5 MIN PRN
Status: ACTIVE | OUTPATIENT
Start: 2019-05-09 | End: 2019-05-09

## 2019-05-09 RX ORDER — ATORVASTATIN CALCIUM 20 MG/1
20 TABLET, FILM COATED ORAL DAILY
Status: DISCONTINUED | OUTPATIENT
Start: 2019-05-10 | End: 2019-05-10 | Stop reason: SDUPTHER

## 2019-05-09 RX ORDER — SPIRONOLACTONE 25 MG/1
25 TABLET ORAL DAILY
Status: DISCONTINUED | OUTPATIENT
Start: 2019-05-10 | End: 2019-05-10 | Stop reason: SDUPTHER

## 2019-05-09 RX ORDER — ALLOPURINOL 300 MG/1
300 TABLET ORAL DAILY
Status: DISCONTINUED | OUTPATIENT
Start: 2019-05-09 | End: 2019-05-10 | Stop reason: SDUPTHER

## 2019-05-09 RX ORDER — METOPROLOL TARTRATE 5 MG/5ML
5 INJECTION INTRAVENOUS EVERY 5 MIN PRN
Status: ACTIVE | OUTPATIENT
Start: 2019-05-09 | End: 2019-05-09

## 2019-05-09 RX ORDER — ASPIRIN 81 MG/1
81 TABLET ORAL DAILY
Status: DISCONTINUED | OUTPATIENT
Start: 2019-05-09 | End: 2019-05-10 | Stop reason: HOSPADM

## 2019-05-09 RX ORDER — CARVEDILOL 12.5 MG/1
12.5 TABLET ORAL 2 TIMES DAILY
Status: DISCONTINUED | OUTPATIENT
Start: 2019-05-09 | End: 2019-05-09

## 2019-05-09 RX ORDER — LEVOTHYROXINE SODIUM 0.07 MG/1
75 TABLET ORAL DAILY
Status: DISCONTINUED | OUTPATIENT
Start: 2019-05-09 | End: 2019-05-10 | Stop reason: SDUPTHER

## 2019-05-09 RX ORDER — HEPARIN SODIUM 5000 [USP'U]/ML
2000 INJECTION, SOLUTION INTRAVENOUS; SUBCUTANEOUS PRN
Status: DISCONTINUED | OUTPATIENT
Start: 2019-05-09 | End: 2019-05-10 | Stop reason: HOSPADM

## 2019-05-09 RX ORDER — CLOPIDOGREL BISULFATE 75 MG/1
75 TABLET ORAL DAILY
Status: DISCONTINUED | OUTPATIENT
Start: 2019-05-10 | End: 2019-05-10 | Stop reason: SDUPTHER

## 2019-05-09 RX ORDER — TORSEMIDE 20 MG/1
20 TABLET ORAL
Status: DISCONTINUED | OUTPATIENT
Start: 2019-05-11 | End: 2019-05-10 | Stop reason: SDUPTHER

## 2019-05-09 RX ORDER — LEVOTHYROXINE SODIUM 0.07 MG/1
75 TABLET ORAL DAILY
COMMUNITY

## 2019-05-09 RX ORDER — SODIUM CHLORIDE 9 MG/ML
500 INJECTION, SOLUTION INTRAVENOUS CONTINUOUS PRN
Status: ACTIVE | OUTPATIENT
Start: 2019-05-09 | End: 2019-05-09

## 2019-05-09 RX ORDER — NITROGLYCERIN 0.4 MG/1
0.4 TABLET SUBLINGUAL EVERY 5 MIN PRN
Status: ACTIVE | OUTPATIENT
Start: 2019-05-09 | End: 2019-05-09

## 2019-05-09 RX ORDER — ALBUTEROL SULFATE 90 UG/1
2 AEROSOL, METERED RESPIRATORY (INHALATION) PRN
Status: ACTIVE | OUTPATIENT
Start: 2019-05-09 | End: 2019-05-09

## 2019-05-09 RX ORDER — TORSEMIDE 20 MG/1
40 TABLET ORAL
COMMUNITY
End: 2019-08-13

## 2019-05-09 RX ADMIN — ACYCLOVIR 200 MG: 200 CAPSULE ORAL at 20:30

## 2019-05-09 RX ADMIN — HEPARIN SODIUM AND DEXTROSE 12 UNITS/KG/HR: 10000; 5 INJECTION INTRAVENOUS at 13:23

## 2019-05-09 RX ADMIN — CARVEDILOL 3.12 MG: 3.12 TABLET ORAL at 13:58

## 2019-05-09 RX ADMIN — Medication 10 ML: at 10:29

## 2019-05-09 RX ADMIN — CARVEDILOL 3.12 MG: 3.12 TABLET ORAL at 20:30

## 2019-05-09 RX ADMIN — HEPARIN SODIUM 4000 UNITS: 5000 INJECTION, SOLUTION INTRAVENOUS; SUBCUTANEOUS at 13:23

## 2019-05-09 RX ADMIN — INSULIN LISPRO 12 UNITS: 100 INJECTION, SOLUTION INTRAVENOUS; SUBCUTANEOUS at 17:04

## 2019-05-09 RX ADMIN — INSULIN LISPRO 58 UNITS: 100 INJECTION, SUSPENSION SUBCUTANEOUS at 17:04

## 2019-05-09 RX ADMIN — ENOXAPARIN SODIUM 30 MG: 30 INJECTION SUBCUTANEOUS at 08:11

## 2019-05-09 RX ADMIN — Medication 10 ML: at 08:09

## 2019-05-09 RX ADMIN — Medication 10 ML: at 09:38

## 2019-05-09 RX ADMIN — LEVOTHYROXINE SODIUM 75 MCG: 0.07 TABLET ORAL at 13:58

## 2019-05-09 RX ADMIN — TETRAKIS(2-METHOXYISOBUTYLISOCYANIDE)COPPER(I) TETRAFLUOROBORATE 11.2 MILLICURIE: 1 INJECTION, POWDER, LYOPHILIZED, FOR SOLUTION INTRAVENOUS at 08:08

## 2019-05-09 ASSESSMENT — ENCOUNTER SYMPTOMS
SHORTNESS OF BREATH: 0
ABDOMINAL PAIN: 0
RESPIRATORY NEGATIVE: 1
COUGH: 0
BACK PAIN: 0
EYES NEGATIVE: 1
GASTROINTESTINAL NEGATIVE: 1

## 2019-05-09 NOTE — ED PROVIDER NOTES
EMERGENCY DEPARTMENT ENCOUNTER    Pt Name: Rafi Escudero  MRN: [id number]  [place] 1940  Date of evaluation: 5/9/19  CHIEF COMPLAINT       Chief Complaint   Patient presents with    Chest Pain     HISTORY OF PRESENT ILLNESS   The pt presents for evaluation of chest pain. The pain started at 1230. Sudden onset, resolved with nitro. Pt took x2 full dose asa prior to arrival.  Pt is asymptomatic at this time. No fever or cough. The history is provided by the patient. Chest Pain   Pain location:  L chest  Pain quality: sharp    Pain radiates to:  Does not radiate  Pain severity:  Moderate  Onset quality:  Sudden  Duration:  1 hour  Timing:  Constant  Progression:  Resolved  Chronicity:  New  Relieved by:  Nitroglycerin  Worsened by:  Nothing  Ineffective treatments:  None tried  Associated symptoms: no abdominal pain, no back pain, no cough, no fever, no headache and no shortness of breath        REVIEW OF SYSTEMS     Review of Systems   Constitutional: Negative. Negative for chills and fever. HENT: Negative. Negative for congestion. Eyes: Negative. Respiratory: Negative. Negative for cough and shortness of breath. Cardiovascular: Positive for chest pain. Gastrointestinal: Negative. Negative for abdominal pain. Genitourinary: Negative. Musculoskeletal: Negative. Negative for back pain. Skin: Negative. Negative for rash. Neurological: Negative. Negative for headaches. All other systems reviewed and are negative. PASTMEDICAL HISTORY     Past Medical History:   Diagnosis Date    Allergic rhinitis     CAD (coronary artery disease)     s/p stents  total x5    Cholelithiasis     Chronic cough 9/14/2011    Chronic cystitis 9/14/2011    Colitis 09/2016    GERD 9/14/2011    Gout     Gu[place]ain[place] ([facility] [address]     history of     Hyperlipidemia     Hypertension     Irritable bowel syndrome     Migraines     hx.  of    Multiple myeloma (HCC)     OA (osteoarthritis) 9/14/2011    Osteoarthritis     Osteopenia     Pneumonia     Post-menopausal     vaginal atrophy    Pulmonary nodules     Raynaud's disease     S/P cardiac cath     x4    Sleep apnea     no machine at night.  Thyroid disease     Type 2 diabetes mellitus 9/14/2011    Type II or unspecified type diabetes mellitus without mention of complication, not stated as uncontrolled      SURGICAL HISTORY       Past Surgical History:   Procedure Laterality Date    APPENDECTOMY  07/16/12   [address] [place] [address] CARDIAC CATHETERIZATION  1999, 2009, 2013, 2016    CARDIAC CATHETERIZATION  10/22/13    CARDIAC CATHETERIZATION  1/16/18    mid LAD stent    CHOLECYSTECTOMY  4/1988    COLONOSCOPY  5/5/09    CORONARY ANGIOPLASTY WITH STENT PLACEMENT  1/18/2016    stent X 1    KNEE ARTHROSCOPY Right 1993    UPPER GASTROINTESTINAL ENDOSCOPY  5/5/09     CURRENT MEDICATIONS       Previous Medications    ACYCLOVIR (ZOVIRAX) 200 MG CAPSULE    Take 200 mg by mouth 2 times daily     ALBUTEROL SULFATE HFA (PROVENTIL HFA) 108 (90 BASE) MCG/ACT INHALER    Inhale 2 puffs into the lungs every 6 hours as needed for Wheezing    ALLOPURINOL (ZYLOPRIM) 300 MG TABLET    TAKE 1 TABLET BY MOUTH ONE TIME A DAY     ASPIRIN 81 MG EC TABLET    Take 81 mg by mouth daily. ATORVASTATIN (LIPITOR) 20 MG TABLET    TAKE 1 TABLET BY MOUTH ONE TIME A DAY     B COMPLEX VITAMINS CAPSULE    Take 1 capsule by mouth daily OTC    BIOTIN 5000 MCG TABS    Take  by mouth daily.       BORTEZOMIB (VELCADE) 3.5 MG CHEMO INNJECTION    Sig: once a week injection    CARVEDILOL (COREG) 12.5 MG TABLET    Take 1 tablet by mouth 2 times daily    CLOPIDOGREL (PLAVIX) 75 MG TABLET    Take 75 mg by mouth daily    FLUTICASONE-SALMETEROL (ADVAIR DISKUS) 100-50 MCG/DOSE DISKUS INHALER    INHALE ONE PUFF BY MOUTH EVERY 12 HOURS    INSULIN 70-30 (NOVOLIN 70/30) (70-30) 100 UNIT PER ML INJECTION VIAL    Inject into the skin 2 times daily Indications: 50  units am, 60-70 units afternoon, 50 units evening Uses sliding scale bid    INSULIN ASPART (NOVOLOG) 100 UNIT/ML INJECTION VIAL    Inject into the skin 3 times daily (before meals)    LEVOTHYROXINE (SYNTHROID) 50 MCG TABLET    TAKE ONE TABLET BY MOUTH EVERY DAY    MAGNESIUM OXIDE (MAG-OX) 400 (241.3 MG) MG TABS TABLET    Take 1 tablet by mouth 2 times daily for 15 days    NITROGLYCERIN (NITROSTAT) 0.4 MG SL TABLET    Place 1 tablet under the tongue every 5 minutes as needed for Chest pain    ONDANSETRON (ZOFRAN ODT) 4 MG DISINTEGRATING TABLET    Take 1 tablet by mouth every 8 hours as needed for Nausea    ONDANSETRON (ZOFRAN) 8 MG TABLET    Take 8 mg by mouth every 8 hours as needed for Nausea or Vomiting    PANTOPRAZOLE (PROTONIX) 20 MG TABLET    Take 1 tablet by mouth daily    PYRIDOXINE (B-6) 50 MG TABLET    Take 50 mg by mouth    SPIRONOLACTONE (ALDACTONE) 25 MG TABLET    TAKE 1 TABLET BY MOUTH ONE TIME A DAY     TORSEMIDE (DEMADEX) 20 MG TABLET    TAKE 40 MG  AND 20 MG T-TH-SA-SUN    VITAMIN D (ERGOCALCIFEROL) 96560 UNITS CAPS CAPSULE    TAKE 1 CAPSULE BY MOUTH ONE TIME A WEEK     ALLERGIES     is allergic to hepatitis b virus vaccine; norvasc [amlodipine besylate]; tramadol; fentanyl; percocet [oxycodone-acetaminophen]; and velcade [bortezomib]. FAMILY HISTORY     indicated that her mother is . She indicated that her father is . She indicated that the status of her sister is unknown. She indicated that the status of her brother is unknown. She indicated that the status of her daughter is unknown. SOCIAL HISTORY       Social History     Tobacco Use    Smoking status: Never Smoker    Smokeless tobacco: Never Used   Substance Use Topics    Alcohol use: No    Drug use: No     PHYSICAL EXAM     INITIAL VITALS: BP (!) 160/69   Pulse 82   Temp 97.7 °F (36.5 °C) (Oral)   Resp 20   SpO2 99%    Physical Exam   Constitutional: She is oriented to person, place, and time. No distress.    HENT:   Head: Normocephalic and atraumatic. Eyes: Conjunctivae are normal.   Neck: Normal range of motion. Neck supple. Cardiovascular: Normal rate and normal heart sounds. An irregularly irregular rhythm present. No murmur heard. Pulmonary/Chest: Effort normal and breath sounds normal.   Abdominal: Soft. There is no tenderness. Musculoskeletal: She exhibits no edema or tenderness. No calf tenderness. Neurological: She is alert and oriented to person, place, and time. Skin: Skin is warm and dry. Capillary refill takes less than 2 seconds. No rash noted. She is not diaphoretic. Nursing note and vitals reviewed. MEDICAL DECISION MAKING:     Reviewed results. Ekg demonstrates afib, pt states no history but she is rate controlled, trop mildly elevated. CXR, clinically felt less likely infectious. Labs otherwise nonacute. On reeval, pt appears well, no distress, nontoxic. Still symptom free. Discussed case with medicine, will admit for further therapy and evaluation. Pt is ready for admission at this time. CRITICAL CARE:       PROCEDURES:    Procedures    DIAGNOSTIC RESULTS   EKG:All EKG's are interpreted by the Emergency Department Physician who either signs or Co-signs this chart in the absence of a cardiologist.    Ekg, rate of 81, afib, no st seg changes, normal qrs, no acute ischemic changes. RADIOLOGY:All plain film, CT, MRI, and formal ultrasound images (except ED bedside ultrasound) are read by the radiologist, see reports below, unless otherwisenoted in MDM or here. XR CHEST PORTABLE   Final Result   Mild bibasilar heterogeneous opacities could represent subsegmental   atelectasis or a developing infectious/inflammatory process. No lobar   consolidation. LABS: All lab results were reviewed by myself, and all abnormals are listed below.   Labs Reviewed   CBC WITH AUTO DIFFERENTIAL - Abnormal; Notable for the following components:       Result Value    RBC 3.66 (*)     Hemoglobin 11.5 (*) Hematocrit 35.3 (*)     RDW 16.2 (*)     Monocytes 12 (*)     All other components within normal limits   COMPREHENSIVE METABOLIC PANEL - Abnormal; Notable for the following components:    Glucose 140 (*)     BUN 31 (*)     CREATININE 1.60 (*)     Sodium 133 (*)     Chloride 94 (*)     Alkaline Phosphatase 136 (*)     GFR Non- 31 (*)     GFR  38 (*)     All other components within normal limits   APTT - Abnormal; Notable for the following components:    PTT 44.2 (*)     All other components within normal limits   PROTIME-INR - Abnormal; Notable for the following components:    Protime 14.7 (*)     All other components within normal limits   TROPONIN - Abnormal; Notable for the following components:    Troponin, High Sensitivity 30 (*)     All other components within normal limits   TROPONIN       EMERGENCY DEPARTMENTCOURSE:         Vitals:    Vitals:    05/09/19 0218 05/09/19 0228 05/09/19 0335 05/09/19 0347   BP: 126/61  (!) 128/48 (!) 160/69   Pulse: 78  72 82   Resp: 18  16 20   Temp:  97.9 °F (36.6 °C) 97.7 °F (36.5 °C)    TempSrc:  Oral Oral    SpO2: 99%  97% 99%       The patient was given the following medications while in the emergency department:  No orders of the defined types were placed in this encounter. CONSULTS:  IP CONSULT TO INTERNAL MEDICINE    FINAL IMPRESSION      1. Chest pain, unspecified type    2.  Atrial fibrillation, unspecified type [facility])          DISPOSITION/PLAN   DISPOSITION Admitted 05/09/2019 03:46:03 AM      PATIENT REFERRED TO:  PHILIP Huerta - CNP  [facility]  [address] [facility] [address]  [address] [facility] 264  [phone]          DISCHARGE MEDICATIONS:  New Prescriptions    No medications on file     Iain Mcdonald MD  Attending Emergency Physician                    Kecia Eddy MD  05/09/19 4312

## 2019-05-09 NOTE — H&P
 CARDIAC CATHETERIZATION  1/16/18    mid LAD stent    CHOLECYSTECTOMY  4/1988    COLONOSCOPY  5/5/09    CORONARY ANGIOPLASTY WITH STENT PLACEMENT  1/18/2016    stent X 1    KNEE ARTHROSCOPY Right 0620    UMBILICAL HERNIA REPAIR      UPPER GASTROINTESTINAL ENDOSCOPY  5/5/09       Medications Prior to Admission:    Prior to Admission medications    Medication Sig Start Date End Date Taking?  Authorizing Provider   vitamin D (ERGOCALCIFEROL) 88316 units CAPS capsule TAKE 1 CAPSULE BY MOUTH ONE TIME A WEEK 3/28/19  Yes Patrick Dimas MD   spironolactone (ALDACTONE) 25 MG tablet TAKE 1 TABLET BY MOUTH ONE TIME A DAY  3/21/19  Yes Patrick Dimas MD   magnesium oxide (MAG-OX) 400 (241.3 Mg) MG TABS tablet Take 1 tablet by mouth 2 times daily for 15 days 3/19/19 5/9/19 Yes Patrick Dimas MD   torsemide (DEMADEX) 20 MG tablet TAKE 40 MG ON M-W-F AND 20 MG T-TH-SA-SUN 2/7/19  Yes Patrick Dimas MD   pyridoxine (B-6) 50 MG tablet Take 50 mg by mouth 12/10/18  Yes Historical Provider, MD   pantoprazole (PROTONIX) 20 MG tablet Take 1 tablet by mouth daily 11/2/18  Yes PHILIP Huerta CNP   ondansetron (ZOFRAN ODT) 4 MG disintegrating tablet Take 1 tablet by mouth every 8 hours as needed for Nausea 10/27/18  Yes Carlo Bailey MD   allopurinol (ZYLOPRIM) 300 MG tablet TAKE 1 TABLET BY MOUTH ONE TIME A DAY  9/4/18  Yes PHILIP Huerta CNP   atorvastatin (LIPITOR) 20 MG tablet TAKE 1 TABLET BY MOUTH ONE TIME A DAY  8/15/18  Yes PHILIP Huerta CNP   bortezomib (VELCADE) 3.5 MG chemo innjection Sig: once a week injection  Patient taking differently: Sig: every other week injection 6/5/18  Yes PHILIP Huerta CNP   carvedilol (COREG) 12.5 MG tablet Take 1 tablet by mouth 2 times daily 1/22/18  Yes Patrick Dimas MD   insulin aspart (NOVOLOG) 100 UNIT/ML injection vial Inject into the skin 3 times daily (before meals)   Yes Historical Provider, MD   insulin 70-30 (NOVOLIN 70/30) (70-30) 100 UNIT per ML injection vial Inject into the skin 2 times daily Indications: 50  units am, 60-70 units afternoon, 50 units evening Uses sliding scale bid   Yes Historical Provider, MD   ondansetron (ZOFRAN) 8 MG tablet Take 8 mg by mouth every 8 hours as needed for Nausea or Vomiting   Yes Historical Provider, MD   acyclovir (ZOVIRAX) 200 MG capsule Take 200 mg by mouth 2 times daily  7/20/17  Yes Historical Provider, MD   albuterol sulfate HFA (PROVENTIL HFA) 108 (90 BASE) MCG/ACT inhaler Inhale 2 puffs into the lungs every 6 hours as needed for Wheezing 3/31/17 6/15/19 Yes PHILIP Albrecht CNP   fluticasone-salmeterol (ADVAIR DISKUS) 100-50 MCG/DOSE diskus inhaler INHALE ONE PUFF BY MOUTH EVERY 12 HOURS 3/9/17  Yes PHILIP Albrecht CNP   clopidogrel (PLAVIX) 75 MG tablet Take 75 mg by mouth daily   Yes Historical Provider, MD   nitroGLYCERIN (NITROSTAT) 0.4 MG SL tablet Place 1 tablet under the tongue every 5 minutes as needed for Chest pain 7/21/15  Yes PHILIP Albrecht CNP   levothyroxine (SYNTHROID) 50 MCG tablet TAKE ONE TABLET BY MOUTH EVERY DAY 4/20/15  Yes Bernabe Adjutant, MD   Biotin 5000 MCG TABS Take  by mouth daily. Yes Historical Provider, MD   aspirin 81 MG EC tablet Take 81 mg by mouth daily. Yes Historical Provider, MD   b complex vitamins capsule Take 1 capsule by mouth daily OTC    Historical Provider, MD       Allergies:  Hepatitis b virus vaccine; Norvasc [amlodipine besylate]; Tramadol; Fentanyl; Percocet [oxycodone-acetaminophen]; and Velcade [bortezomib]    Social History:  The patient currently lives at home    TOBACCO:   reports that she has never smoked. She has never used smokeless tobacco.  ETOH:   reports that she does not drink alcohol.     Review of Systems - General ROS: negative  Psychological ROS: negative  Ophthalmic ROS: negative  ENT ROS: negative  Endocrine ROS: positive for - hypothyroid & hyperglycemia  Respiratory ROS: positive for - sleep apnea  Cardiovascular ROS: positive for - chest pain  Gastrointestinal ROS: negative  Musculoskeletal ROS: positive for - joint stiffness  Neurological ROS: negative      Family History:          Problem Relation Age of Onset    Diabetes Mother     Hypertension Mother     Heart Disease Mother     Stroke Mother     Hypertension Father     Heart Disease Father     Stroke Father     Hypertension Sister     Diabetes Brother     Hypertension Brother     Kidney Disease Brother     Breast Cancer Daughter     Cancer Daughter         breast       PHYSICAL EXAM:    /66   Pulse 65   Temp 97.7 °F (36.5 °C) (Oral)   Resp 16   Ht 5' 1\" (1.549 m)   Wt 197 lb 15.6 oz (89.8 kg)   SpO2 98%   BMI 37.41 kg/m²     General appearance: No apparent distress appears stated age and cooperative. HEENT Normal cephalic, atraumatic without obvious deformity. Pupils equal, round, and reactive to light. Extra ocular muscles intact. Conjunctivae/corneas clear. Neck: Supple, No jugular venous distention/bruits. Trachea midline without thyromegaly or adenopathy with full range of motion. Lungs: Clear to auscultation, bilaterally without Rales/Wheezes/Rhonchi with good respiratory effort. Heart: Regular rate and rhythm with Normal S1/S2 without murmurs, rubs or gallops, point of maximum impulse non-displaced  Abdomen: Soft, non-tender or non-distended without rigidity or guarding and positive bowel sounds all four quadrants. Extremities: No clubbing, cyanosis, or edema bilaterally. Full range of motion without deformity and normal gait intact. Skin: Skin color, texture, turgor normal.  No rashes or lesions. Neurologic: Alert and oriented X 3, neurovascularly intact with sensory/motor intact upper extremities/lower extremities, bilaterally. Cranial nerves: II-XII intact, grossly non-focal.  Mental status: Alert, oriented, thought content appropriate.     CXR:  I have reviewed the CXR with the following

## 2019-05-09 NOTE — CONSULTS
hernia repair January 2019 mild appendectomy 2012    Systemic review: No history of GI bleeding, no TIA or CVA  Drug allergy: fentanyl, Percocet    Patient seen and examined and the stress lab  Stress test was canceled  Hemodynamically stable    Electrocardiogram showed A. fib, no ischemic changes    Medications:   Scheduled Meds:   sodium chloride flush  10 mL Intravenous 2 times per day    aspirin  81 mg Oral Daily    levothyroxine  75 mcg Oral Daily    [START ON 5/10/2019] clopidogrel  75 mg Oral Daily    mometasone-formoterol  2 puff Inhalation BID    acyclovir  200 mg Oral BID    insulin lispro protamine & lispro  50 Units Subcutaneous TID    vitamin B and C  1 tablet Oral Daily    [START ON 5/10/2019] atorvastatin  20 mg Oral Daily    allopurinol  300 mg Oral Daily    pantoprazole  20 mg Oral Daily    [START ON 5/10/2019] pyridoxine  50 mg Oral Daily    magnesium oxide  400 mg Oral BID    [START ON 5/10/2019] spironolactone  25 mg Oral Daily    vitamin D  50,000 Units Oral Weekly    insulin lispro  0-18 Units Subcutaneous TID WC    insulin lispro  0-9 Units Subcutaneous Nightly    heparin (porcine)  4,000 Units Intravenous Once    [START ON 5/10/2019] torsemide  40 mg Oral Once per day on Mon Wed Fri    carvedilol  3.125 mg Oral BID    [START ON 5/11/2019] torsemide  20 mg Oral Once per day on Sun Tue Thu Sat     Continuous Infusions:   sodium chloride      dextrose      heparin (porcine)       CBC:   Recent Labs     05/09/19 0211 05/09/19  1237   WBC 4.9 4.1   HGB 11.5* 11.8*    164     BMP:    Recent Labs     05/09/19 0211   *   K 4.8   CL 94*   CO2 27   BUN 31*   CREATININE 1.60*   GLUCOSE 140*     Hepatic:   Recent Labs     05/09/19 0211   AST 29   ALT 20   BILITOT 0.35   ALKPHOS 136*     Troponin: No results for input(s): TROPONINI in the last 72 hours. BNP: No results for input(s): BNP in the last 72 hours.   Lipids: No results for input(s): CHOL, HDL in the last

## 2019-05-09 NOTE — PROGRESS NOTES
Spoke to RN, stress test on hold since patient is scheduled for a cath tomorrow.  Please call nuclear medicine with any updates or with any questions at [phone], thank you Electronically signed by Salina Redding on 5/9/2019 at 7:49 AM

## 2019-05-09 NOTE — PROGRESS NOTES
Dr Aleisha Olson spoke via phone with Dr Rafi Gill re: new a-fib on EKG prior to stress test. Stress test cancelled.

## 2019-05-10 ENCOUNTER — HOSPITAL ENCOUNTER (INPATIENT)
Dept: CARDIAC CATH/INVASIVE PROCEDURES | Age: 79
LOS: 1 days | Discharge: HOME | DRG: 247 | End: 2019-05-11
Attending: INTERNAL MEDICINE | Admitting: INTERNAL MEDICINE
Payer: MEDICARE

## 2019-05-10 VITALS
WEIGHT: 209.66 LBS | DIASTOLIC BLOOD PRESSURE: 62 MMHG | RESPIRATION RATE: 17 BRPM | OXYGEN SATURATION: 99 % | HEART RATE: 75 BPM | SYSTOLIC BLOOD PRESSURE: 126 MMHG | TEMPERATURE: 97.9 F | HEIGHT: 61 IN | BODY MASS INDEX: 39.58 KG/M2

## 2019-05-10 PROBLEM — I21.4 NSTEMI (NON-ST ELEVATED MYOCARDIAL INFARCTION) (HCC): Status: ACTIVE | Noted: 2019-05-10

## 2019-05-10 LAB
ANTI-XA UNFRAC HEPARIN: 0.89 IU/L (ref 0.3–0.7)
GLUCOSE BLD-MCNC: 136 MG/DL (ref 65–105)
GLUCOSE BLD-MCNC: 65 MG/DL (ref 65–105)
GLUCOSE BLD-MCNC: 92 MG/DL (ref 65–105)

## 2019-05-10 PROCEDURE — 6360000002 HC RX W HCPCS

## 2019-05-10 PROCEDURE — 2709999900 HC NON-CHARGEABLE SUPPLY

## 2019-05-10 PROCEDURE — 6370000000 HC RX 637 (ALT 250 FOR IP): Performed by: INTERNAL MEDICINE

## 2019-05-10 PROCEDURE — 82947 ASSAY GLUCOSE BLOOD QUANT: CPT

## 2019-05-10 PROCEDURE — 85520 HEPARIN ASSAY: CPT

## 2019-05-10 PROCEDURE — C1725 CATH, TRANSLUMIN NON-LASER: HCPCS

## 2019-05-10 PROCEDURE — 36415 COLL VENOUS BLD VENIPUNCTURE: CPT

## 2019-05-10 PROCEDURE — 6370000000 HC RX 637 (ALT 250 FOR IP): Performed by: FAMILY MEDICINE

## 2019-05-10 PROCEDURE — 99233 SBSQ HOSP IP/OBS HIGH 50: CPT | Performed by: FAMILY MEDICINE

## 2019-05-10 PROCEDURE — 93458 L HRT ARTERY/VENTRICLE ANGIO: CPT | Performed by: INTERNAL MEDICINE

## 2019-05-10 PROCEDURE — 2580000003 HC RX 258: Performed by: INTERNAL MEDICINE

## 2019-05-10 PROCEDURE — 6360000004 HC RX CONTRAST MEDICATION

## 2019-05-10 PROCEDURE — 027034Z DILATION OF CORONARY ARTERY, ONE ARTERY WITH DRUG-ELUTING INTRALUMINAL DEVICE, PERCUTANEOUS APPROACH: ICD-10-PCS | Performed by: INTERNAL MEDICINE

## 2019-05-10 PROCEDURE — C1887 CATHETER, GUIDING: HCPCS

## 2019-05-10 PROCEDURE — 99223 1ST HOSP IP/OBS HIGH 75: CPT | Performed by: FAMILY MEDICINE

## 2019-05-10 PROCEDURE — 2500000003 HC RX 250 WO HCPCS

## 2019-05-10 PROCEDURE — 2060000000 HC ICU INTERMEDIATE R&B

## 2019-05-10 PROCEDURE — C1874 STENT, COATED/COV W/DEL SYS: HCPCS

## 2019-05-10 PROCEDURE — B2151ZZ FLUOROSCOPY OF LEFT HEART USING LOW OSMOLAR CONTRAST: ICD-10-PCS | Performed by: INTERNAL MEDICINE

## 2019-05-10 PROCEDURE — C1769 GUIDE WIRE: HCPCS

## 2019-05-10 PROCEDURE — C1894 INTRO/SHEATH, NON-LASER: HCPCS

## 2019-05-10 PROCEDURE — 6370000000 HC RX 637 (ALT 250 FOR IP)

## 2019-05-10 PROCEDURE — 6360000002 HC RX W HCPCS: Performed by: INTERNAL MEDICINE

## 2019-05-10 PROCEDURE — C1760 CLOSURE DEV, VASC: HCPCS

## 2019-05-10 PROCEDURE — C9600 PERC DRUG-EL COR STENT SING: HCPCS | Performed by: INTERNAL MEDICINE

## 2019-05-10 PROCEDURE — B2111ZZ FLUOROSCOPY OF MULTIPLE CORONARY ARTERIES USING LOW OSMOLAR CONTRAST: ICD-10-PCS | Performed by: INTERNAL MEDICINE

## 2019-05-10 RX ORDER — ACYCLOVIR 200 MG/1
200 CAPSULE ORAL 2 TIMES DAILY
Status: DISCONTINUED | OUTPATIENT
Start: 2019-05-10 | End: 2019-05-11 | Stop reason: HOSPADM

## 2019-05-10 RX ORDER — SODIUM CHLORIDE 0.9 % (FLUSH) 0.9 %
10 SYRINGE (ML) INJECTION PRN
Status: DISCONTINUED | OUTPATIENT
Start: 2019-05-10 | End: 2019-05-11 | Stop reason: HOSPADM

## 2019-05-10 RX ORDER — ONDANSETRON 4 MG/1
8 TABLET, FILM COATED ORAL EVERY 8 HOURS PRN
Status: DISCONTINUED | OUTPATIENT
Start: 2019-05-10 | End: 2019-05-11 | Stop reason: HOSPADM

## 2019-05-10 RX ORDER — CARVEDILOL 3.12 MG/1
3.12 TABLET ORAL 2 TIMES DAILY
Status: DISCONTINUED | OUTPATIENT
Start: 2019-05-10 | End: 2019-05-11 | Stop reason: HOSPADM

## 2019-05-10 RX ORDER — LEVOTHYROXINE SODIUM 0.07 MG/1
75 TABLET ORAL DAILY
Status: DISCONTINUED | OUTPATIENT
Start: 2019-05-10 | End: 2019-05-10 | Stop reason: HOSPADM

## 2019-05-10 RX ORDER — ASPIRIN 81 MG/1
81 TABLET ORAL DAILY
Status: DISCONTINUED | OUTPATIENT
Start: 2019-05-10 | End: 2019-05-11 | Stop reason: HOSPADM

## 2019-05-10 RX ORDER — ALLOPURINOL 300 MG/1
300 TABLET ORAL DAILY
Status: DISCONTINUED | OUTPATIENT
Start: 2019-05-10 | End: 2019-05-10 | Stop reason: HOSPADM

## 2019-05-10 RX ORDER — PANTOPRAZOLE SODIUM 20 MG/1
20 TABLET, DELAYED RELEASE ORAL DAILY
Status: DISCONTINUED | OUTPATIENT
Start: 2019-05-10 | End: 2019-05-10 | Stop reason: HOSPADM

## 2019-05-10 RX ORDER — TORSEMIDE 20 MG/1
40 TABLET ORAL
Status: DISCONTINUED | OUTPATIENT
Start: 2019-05-10 | End: 2019-05-11 | Stop reason: HOSPADM

## 2019-05-10 RX ORDER — ALBUTEROL SULFATE 90 UG/1
2 AEROSOL, METERED RESPIRATORY (INHALATION) EVERY 6 HOURS PRN
Status: DISCONTINUED | OUTPATIENT
Start: 2019-05-10 | End: 2019-05-11 | Stop reason: HOSPADM

## 2019-05-10 RX ORDER — TORSEMIDE 20 MG/1
40 TABLET ORAL
Status: DISCONTINUED | OUTPATIENT
Start: 2019-05-10 | End: 2019-05-10 | Stop reason: HOSPADM

## 2019-05-10 RX ORDER — CARVEDILOL 3.12 MG/1
3.12 TABLET ORAL 2 TIMES DAILY
Status: DISCONTINUED | OUTPATIENT
Start: 2019-05-10 | End: 2019-05-10 | Stop reason: HOSPADM

## 2019-05-10 RX ORDER — LEVOTHYROXINE SODIUM 0.07 MG/1
75 TABLET ORAL DAILY
Status: DISCONTINUED | OUTPATIENT
Start: 2019-05-10 | End: 2019-05-11 | Stop reason: HOSPADM

## 2019-05-10 RX ORDER — SODIUM CHLORIDE 9 MG/ML
INJECTION, SOLUTION INTRAVENOUS CONTINUOUS
Status: DISCONTINUED | OUTPATIENT
Start: 2019-05-10 | End: 2019-05-11

## 2019-05-10 RX ORDER — ACYCLOVIR 200 MG/1
200 CAPSULE ORAL 2 TIMES DAILY
Status: DISCONTINUED | OUTPATIENT
Start: 2019-05-10 | End: 2019-05-10 | Stop reason: HOSPADM

## 2019-05-10 RX ORDER — ONDANSETRON 4 MG/1
4 TABLET, ORALLY DISINTEGRATING ORAL EVERY 8 HOURS PRN
Status: DISCONTINUED | OUTPATIENT
Start: 2019-05-10 | End: 2019-05-11 | Stop reason: HOSPADM

## 2019-05-10 RX ORDER — TORSEMIDE 20 MG/1
20 TABLET ORAL
Status: DISCONTINUED | OUTPATIENT
Start: 2019-05-11 | End: 2019-05-11 | Stop reason: HOSPADM

## 2019-05-10 RX ORDER — ATORVASTATIN CALCIUM 20 MG/1
20 TABLET, FILM COATED ORAL NIGHTLY
Status: DISCONTINUED | OUTPATIENT
Start: 2019-05-10 | End: 2019-05-10

## 2019-05-10 RX ORDER — ALLOPURINOL 300 MG/1
300 TABLET ORAL DAILY
Status: DISCONTINUED | OUTPATIENT
Start: 2019-05-10 | End: 2019-05-11 | Stop reason: HOSPADM

## 2019-05-10 RX ORDER — ERGOCALCIFEROL 1.25 MG/1
50000 CAPSULE ORAL WEEKLY
Status: DISCONTINUED | OUTPATIENT
Start: 2019-05-10 | End: 2019-05-10 | Stop reason: HOSPADM

## 2019-05-10 RX ORDER — ACETAMINOPHEN 325 MG/1
650 TABLET ORAL EVERY 4 HOURS PRN
Status: DISCONTINUED | OUTPATIENT
Start: 2019-05-10 | End: 2019-05-11 | Stop reason: HOSPADM

## 2019-05-10 RX ORDER — ATORVASTATIN CALCIUM 80 MG/1
80 TABLET, FILM COATED ORAL NIGHTLY
Status: DISCONTINUED | OUTPATIENT
Start: 2019-05-10 | End: 2019-05-11 | Stop reason: HOSPADM

## 2019-05-10 RX ORDER — SPIRONOLACTONE 25 MG/1
25 TABLET ORAL DAILY
Status: DISCONTINUED | OUTPATIENT
Start: 2019-05-10 | End: 2019-05-10 | Stop reason: HOSPADM

## 2019-05-10 RX ORDER — CLOPIDOGREL BISULFATE 75 MG/1
75 TABLET ORAL DAILY
Status: DISCONTINUED | OUTPATIENT
Start: 2019-05-10 | End: 2019-05-11 | Stop reason: HOSPADM

## 2019-05-10 RX ORDER — SPIRONOLACTONE 25 MG/1
25 TABLET ORAL DAILY
Status: DISCONTINUED | OUTPATIENT
Start: 2019-05-10 | End: 2019-05-11

## 2019-05-10 RX ORDER — NITROGLYCERIN 0.4 MG/1
0.4 TABLET SUBLINGUAL EVERY 5 MIN PRN
Status: DISCONTINUED | OUTPATIENT
Start: 2019-05-10 | End: 2019-05-11 | Stop reason: HOSPADM

## 2019-05-10 RX ORDER — PANTOPRAZOLE SODIUM 20 MG/1
20 TABLET, DELAYED RELEASE ORAL DAILY
Status: DISCONTINUED | OUTPATIENT
Start: 2019-05-10 | End: 2019-05-11 | Stop reason: HOSPADM

## 2019-05-10 RX ORDER — TORSEMIDE 20 MG/1
20 TABLET ORAL
Status: DISCONTINUED | OUTPATIENT
Start: 2019-05-11 | End: 2019-05-10 | Stop reason: HOSPADM

## 2019-05-10 RX ORDER — ATORVASTATIN CALCIUM 20 MG/1
20 TABLET, FILM COATED ORAL DAILY
Status: DISCONTINUED | OUTPATIENT
Start: 2019-05-10 | End: 2019-05-10 | Stop reason: HOSPADM

## 2019-05-10 RX ORDER — SODIUM CHLORIDE 0.9 % (FLUSH) 0.9 %
10 SYRINGE (ML) INJECTION EVERY 12 HOURS SCHEDULED
Status: DISCONTINUED | OUTPATIENT
Start: 2019-05-10 | End: 2019-05-11 | Stop reason: HOSPADM

## 2019-05-10 RX ORDER — CLOPIDOGREL BISULFATE 75 MG/1
75 TABLET ORAL DAILY
Status: DISCONTINUED | OUTPATIENT
Start: 2019-05-10 | End: 2019-05-10 | Stop reason: HOSPADM

## 2019-05-10 RX ADMIN — ACETAMINOPHEN 650 MG: 325 TABLET ORAL at 21:50

## 2019-05-10 RX ADMIN — ATORVASTATIN CALCIUM 80 MG: 80 TABLET, FILM COATED ORAL at 21:50

## 2019-05-10 RX ADMIN — CARVEDILOL 3.12 MG: 3.12 TABLET, FILM COATED ORAL at 09:54

## 2019-05-10 RX ADMIN — CLOPIDOGREL BISULFATE 75 MG: 75 TABLET ORAL at 09:54

## 2019-05-10 RX ADMIN — MAGNESIUM GLUCONATE 500 MG ORAL TABLET 400 MG: 500 TABLET ORAL at 21:48

## 2019-05-10 RX ADMIN — HEPARIN SODIUM AND DEXTROSE 9.02 UNITS/KG/HR: 10000; 5 INJECTION INTRAVENOUS at 10:14

## 2019-05-10 RX ADMIN — ASPIRIN 81 MG: 81 TABLET, COATED ORAL at 09:54

## 2019-05-10 RX ADMIN — ACYCLOVIR 200 MG: 200 CAPSULE ORAL at 21:49

## 2019-05-10 RX ADMIN — SODIUM CHLORIDE: 9 INJECTION, SOLUTION INTRAVENOUS at 18:58

## 2019-05-10 RX ADMIN — CARVEDILOL 3.12 MG: 3.12 TABLET, FILM COATED ORAL at 21:49

## 2019-05-10 SDOH — HEALTH STABILITY: MENTAL HEALTH: HOW OFTEN DO YOU HAVE A DRINK CONTAINING ALCOHOL?: MONTHLY OR LESS

## 2019-05-10 SDOH — SOCIAL STABILITY: SOCIAL NETWORK: ARE YOU MARRIED, WIDOWED, DIVORCED, SEPARATED, NEVER MARRIED, OR LIVING WITH A PARTNER?: MARRIED

## 2019-05-10 SDOH — HEALTH STABILITY: MENTAL HEALTH
STRESS IS WHEN SOMEONE FEELS TENSE, NERVOUS, ANXIOUS, OR CAN'T SLEEP AT NIGHT BECAUSE THEIR MIND IS TROUBLED. HOW STRESSED ARE YOU?: TO SOME EXTENT

## 2019-05-10 SDOH — HEALTH STABILITY: MENTAL HEALTH: HOW MANY STANDARD DRINKS CONTAINING ALCOHOL DO YOU HAVE ON A TYPICAL DAY?: 1 OR 2

## 2019-05-10 SDOH — SOCIAL STABILITY: SOCIAL NETWORK
IN A TYPICAL WEEK, HOW MANY TIMES DO YOU TALK ON THE PHONE WITH FAMILY, FRIENDS, OR NEIGHBORS?: MORE THAN THREE TIMES A WEEK

## 2019-05-10 SDOH — SOCIAL STABILITY: SOCIAL INSECURITY
WITHIN THE LAST YEAR, HAVE TO BEEN RAPED OR FORCED TO HAVE ANY KIND OF SEXUAL ACTIVITY BY YOUR PARTNER OR EX-PARTNER?: NO

## 2019-05-10 SDOH — HEALTH STABILITY: PHYSICAL HEALTH: ON AVERAGE, HOW MANY MINUTES DO YOU ENGAGE IN EXERCISE AT THIS LEVEL?: 0 MIN

## 2019-05-10 SDOH — SOCIAL STABILITY: SOCIAL NETWORK: HOW OFTEN DO YOU ATTEND CHURCH OR RELIGIOUS SERVICES?: 1 TO 4 TIMES PER YEAR

## 2019-05-10 SDOH — ECONOMIC STABILITY: FOOD INSECURITY: WITHIN THE PAST 12 MONTHS, YOU WORRIED THAT YOUR FOOD WOULD RUN OUT BEFORE YOU GOT MONEY TO BUY MORE.: NEVER TRUE

## 2019-05-10 SDOH — ECONOMIC STABILITY: FOOD INSECURITY: WITHIN THE PAST 12 MONTHS, THE FOOD YOU BOUGHT JUST DIDN'T LAST AND YOU DIDN'T HAVE MONEY TO GET MORE.: NEVER TRUE

## 2019-05-10 SDOH — ECONOMIC STABILITY: TRANSPORTATION INSECURITY
IN THE PAST 12 MONTHS, HAS THE LACK OF TRANSPORTATION KEPT YOU FROM MEDICAL APPOINTMENTS OR FROM GETTING MEDICATIONS?: NO

## 2019-05-10 SDOH — HEALTH STABILITY: PHYSICAL HEALTH: ON AVERAGE, HOW MANY DAYS PER WEEK DO YOU ENGAGE IN MODERATE TO STRENUOUS EXERCISE (LIKE A BRISK WALK)?: 0 DAYS

## 2019-05-10 SDOH — SOCIAL STABILITY: SOCIAL INSECURITY: WITHIN THE LAST YEAR, HAVE YOU BEEN AFRAID OF YOUR PARTNER OR EX-PARTNER?: NO

## 2019-05-10 SDOH — ECONOMIC STABILITY: INCOME INSECURITY: HOW HARD IS IT FOR YOU TO PAY FOR THE VERY BASICS LIKE FOOD, HOUSING, MEDICAL CARE, AND HEATING?: NOT HARD AT ALL

## 2019-05-10 SDOH — ECONOMIC STABILITY: TRANSPORTATION INSECURITY
IN THE PAST 12 MONTHS, HAS LACK OF TRANSPORTATION KEPT YOU FROM MEETINGS, WORK, OR FROM GETTING THINGS NEEDED FOR DAILY LIVING?: NO

## 2019-05-10 SDOH — SOCIAL STABILITY: SOCIAL NETWORK
DO YOU BELONG TO ANY CLUBS OR ORGANIZATIONS SUCH AS CHURCH GROUPS UNIONS, FRATERNAL OR ATHLETIC GROUPS, OR SCHOOL GROUPS?: NO

## 2019-05-10 SDOH — SOCIAL STABILITY: SOCIAL NETWORK: HOW OFTEN DO YOU ATTENT MEETINGS OF THE CLUB OR ORGANIZATION YOU BELONG TO?: NEVER

## 2019-05-10 SDOH — SOCIAL STABILITY: SOCIAL NETWORK: HOW OFTEN DO YOU GET TOGETHER WITH FRIENDS OR RELATIVES?: ONCE A WEEK

## 2019-05-10 ASSESSMENT — ENCOUNTER SYMPTOMS
VOMITING: 0
WHEEZING: 0
VOICE CHANGE: 0
CONSTIPATION: 0
BLOOD IN STOOL: 0
SORE THROAT: 0
COUGH: 0
DIARRHEA: 0
ABDOMINAL PAIN: 0
SHORTNESS OF BREATH: 0
SINUS PRESSURE: 0
NAUSEA: 0

## 2019-05-10 ASSESSMENT — PAIN DESCRIPTION - PAIN TYPE: TYPE: ACUTE PAIN

## 2019-05-10 ASSESSMENT — PAIN SCALES - GENERAL
PAINLEVEL_OUTOF10: 0
PAINLEVEL_OUTOF10: 5
PAINLEVEL_OUTOF10: 0
PAINLEVEL_OUTOF10: 5
PAINLEVEL_OUTOF10: 0

## 2019-05-10 ASSESSMENT — PAIN DESCRIPTION - LOCATION: LOCATION: GROIN

## 2019-05-10 ASSESSMENT — PAIN DESCRIPTION - ORIENTATION: ORIENTATION: RIGHT

## 2019-05-10 NOTE — PROGRESS NOTES
(ALDACTONE) tablet 25 mg  25 mg Oral Daily Renee Thomas MD        vitamin D (ERGOCALCIFEROL) capsule 50,000 Units  50,000 Units Oral Weekly Renee Thomas MD        glucose (GLUTOSE) 40 % oral gel 15 g  15 g Oral PRN Renee Thomas MD        dextrose 50 % solution 12.5 g  12.5 g Intravenous PRN Renee Thomas MD        glucagon (rDNA) injection 1 mg  1 mg Intramuscular PRN Renee Thomas MD        dextrose 5 % solution  100 mL/hr Intravenous PRN Renee Thomas MD        insulin lispro (HUMALOG) injection vial 0-18 Units  0-18 Units Subcutaneous TID WC Renee Thomas MD   12 Units at 05/09/19 1704    insulin lispro (HUMALOG) injection vial 0-9 Units  0-9 Units Subcutaneous Nightly Renee Thomas MD        sodium chloride flush 0.9 % injection 10 mL  10 mL Intravenous PRN Renee Thomas MD   10 mL at 05/09/19 0809    heparin (porcine) injection 4,000 Units  4,000 Units Intravenous PRN Niki Brown MD        heparin (porcine) injection 2,000 Units  2,000 Units Intravenous PRN Niki Brown MD        heparin 25,000 units in dextrose 5% 250 mL infusion  12 Units/kg/hr Intravenous Continuous Niki Brown MD 8.1 mL/hr at 05/10/19 0518 9 Units/kg/hr at 05/10/19 0518    torsemide (DEMADEX) tablet 40 mg  40 mg Oral Once per day on Mon Wed Fri Renee Thomas MD        carvedilol (COREG) tablet 3.125 mg  3.125 mg Oral BID Renee Thomas MD   3.125 mg at 05/09/19 2030    [START ON 5/11/2019] torsemide (DEMADEX) tablet 20 mg  20 mg Oral Once per day on Sun Tue Thu Sat Renee Thomas MD        insulin lispro protamine & lispro (HUMALOG MIX) (75-25) 100 UNIT per ML injection vial SUSP 45 Units  45 Units Subcutaneous Daily with breakfast Renee Thomas MD        insulin lispro protamine & lispro (HUMALOG MIX) (75-25) 100 UNIT per ML injection vial SUSP 40 Units  40 Units Subcutaneous Lunch Renee Thomas MD        insulin lispro protamine & lispro (HUMALOG MIX) (75-25) 100 UNIT native coronary artery of native heart without angina pectoris     GERD     Chronic cough     OA (osteoarthritis)     LV dysfunction     Edema     Microalbuminuria     Hypothyroidism     Osteopenia     Mild carotid artery disease (HCC)     Chronic gout without tophus     S/P coronary artery stent placement     Chronic renal insufficiency     Pure hypercholesterolemia     Cervical pain (neck)     Paraproteinemia     Colitis     Morbid obesity due to excess calories (HCC)     Multiple myeloma (HCC)     JAMES (obstructive sleep apnea)     Type 2 diabetes mellitus without complication, with long-term current use of insulin ([address]     Essential hypertension     Fall at home     Dyspnea on exertion     Vitamin D deficiency     Anemia     Pain of left lower leg     Cardiac amyloidosis (HCC)     Cardiomyopathy, nonischemic (HCC)     Chronic diastolic CHF (congestive heart failure) ([address]     Examination of participant in clinical trial     Organ-limited amyloidosis ([address]     Anemia     Arteriosclerosis of arteries of extremities (HCC)     Intermittent claudication ([address]     Thrush, oral     Ambulates with cane     Chest pain     Obesity, Class II, BMI 35-39.9     New onset atrial fibrillation ([address]     NSTEMI ([address]           Plan:  Patient admitted with chest pain r/o ACS - cardiac cath today. Continue current treatments. Complete orders per chart.     See orders   Disposition:    Electronically signed by Claudia Reece MD on 5/10/2019 at 7:12 AM

## 2019-05-10 NOTE — FLOWSHEET NOTE
05/10/19 1243   Encounter Summary   Services provided to: Patient   Referral/Consult From: [facility]   (5/10/19V)   Volunteer Visit Yes   Complexity of Encounter Low   Length of Encounter 15 minutes   Spiritual/Episcopal   Type Spiritual support   Intervention Communion   Sacraments   Communion Patient received communion

## 2019-05-10 NOTE — CARE COORDINATION
CASE MANAGEMENT NOTE:    Admission Date:  5/9/2019 Theo Montano is a 77 y.o.  female    Admitted for : Chest pain [R07.9]  Chest pain [R07.9]  Chest pain [R07.9]    Met with:  Patient    PCP:  Catrachito Jackson                                Insurance:  Medicare       Current Residence/ Living Arrangements:  independently at home             Current Services PTA:  No    Is patient agreeable to VNS: No    Freedom of choice provided: NA    List of [address] provided: NA    VNS chosen:  No    DME:  straight cane    Home Oxygen: No    Nebulizer: No    CPAP/BIPAP: No    Supplier: N/A    Potential Assistance Needed: No    SNF needed: No    Pharm[facility]:  [facility]        Is the Patient an [employer] with Readmission Risk Score greater than 14%? Yes  If yes, pt needs a follow up appointment made within 7 days. Does Patient want to use MEDS to BEDS? No    Family Members/Caregivers that pt would like involved in their care:    Yes    If yes, list name here:  [address] [place] [address] Provider:  Family             Is patient in Isolation/One on One/Altered Mental Status? No  If yes, skip next question. If no, would they like an I-Pad to  use? No  If yes, call [phone]. Discharge Plan:  5/10/19 Medicare PT is from home in a two story home with her spouse she uses a cane and denies need for vns Pt will transfer to [facility] for a heart cath today .//tv              Electronically signed by:  Esperanza Mcdonald RN on 5/10/2019 at 2:09 PM

## 2019-05-10 NOTE — OP NOTE
Brief PCI note    Complications:    - No complication    Indications:    - NSTEMI     Conclusions      Procedure Summary      Successful PTCA/DARREN of proximal LAD.   Successful DARREN of distal LAD.      Recommendations      Medical therapy as needed.   Risk factor modification.   Routine Post PCI Orders.     Juan Ramon Echeverria MD

## 2019-05-10 NOTE — PLAN OF CARE
Problem: Falls - Risk of:  Goal: Will remain free from falls  Description  Will remain free from falls  5/10/2019 0526 by Amy Tony RN  Outcome: Ongoing     Problem: Safety:  Goal: Free from accidental physical injury  Description  Free from accidental physical injury  Outcome: Ongoing     Problem: Daily Care:  Goal: Daily care needs are met  Description  Daily care needs are met  Outcome: Ongoing     Problem: Pain:  Goal: Patient's pain/discomfort is manageable  Description  Patient's pain/discomfort is manageable  Outcome: Ongoing.  No c/o pain this shift

## 2019-05-10 NOTE — CARE COORDINATION
[address] Transitions Interview     5/10/2019    Patient: Edna Carrillo Patient : 1940   MRN: [id number]  Reason for Admission: CP      RARS: Readmission Risk Score: 27         Spoke with: Star Oliver and her   Writer met with patient, explained role of CTC, provided contact information, will follow//JU      Readmission Risk  Patient Active Problem List   Diagnosis    Chronic cystitis    Coronary artery disease involving native coronary artery of native heart without angina pectoris    GERD    Chronic cough    OA (osteoarthritis)    LV dysfunction    Edema    Microalbuminuria    Hypothyroidism    Osteopenia    Mild carotid artery disease ([address]    Chronic gout without tophus    S/P coronary artery stent placement    Chronic renal insufficiency    Pure hypercholesterolemia    Cervical pain (neck)    Paraproteinemia    Colitis    Morbid obesity due to excess calories ([address]    Multiple myeloma ([address]    JAMES (obstructive sleep apnea)    Type 2 diabetes mellitus without complication, with long-term current use of insulin ([address]    Essential hypertension    Fall at home    Dyspnea on exertion    Vitamin D deficiency    Anemia    Pain of left lower leg    Cardiac amyloidosis (HCC)    Cardiomyopathy, nonischemic (HCC)    Chronic diastolic CHF (congestive heart failure) ([address]    Examination of participant in clinical trial    Organ-limited amyloidosis ([address]    Anemia    Arteriosclerosis of arteries of extremities ([address]    Intermittent claudication ([address]    Thrush, oral    Ambulates with cane    Chest pain    Obesity, Class II, BMI 35-39.9    New onset atrial fibrillation [facility])       Inpatient Assessment  Care Transitions Summary    Care Transitions Inpatient Review  Medication Review  Do you have all of your prescriptions and are they filled?:  Yes   Barriers to Medication Adherence:  None  Are you able to afford your medications?:  Yes  Housing Review  Who do you live with?:  Partner/Spouse/SO  Are you an active caregiver in your home?:  No  Social Support  Durable Medical Equipment  Patient DME:  Straight cane  Functional Review  Ability to seek help/take action for Emergent/Urgent situations i.e. fire, crime, inclement weather or health crisis. :  Independent  Ability handle personal hygiene needs (bathing/dressing/grooming): Independent  Ability to manage medications: Independent  Ability to prepare food:  Independent  Hearing and Vision  Care Transitions Interventions     Other Services:   (Comment: chemo)          Follow Up  Future Appointments   Date Time Provider Valerie Recinos   5/10/2019  2:00 PM [facility] CATH LAB RM A [facility]   5/13/2019 11:00 AM Rony Lincoln DPM [facility]   7/19/2019 12:45 PM Mirlande Gutierrez MD [facility] None       Health Maintenance  There are no preventive care reminders to display for this patient.     Charla Vick, RN

## 2019-05-10 NOTE — BRIEF OP NOTE
Brief Postoperative Note  ______________________________________________________________    Patient: Yovany Reyes  YOB: 1940  MRN: [id number]  Date of Procedure: 5/10/2019  Procedure: Coronary arteriograms, LV angiography    Pre-Op Diagnosis: NSTEMI    Post-Op Diagnosis: Same       Anesthesia: Local, lidocaine 1% 20 mL right groin    Estimated Blood Loss (mL): Less than 10 mL    Complications: None  Implant: Angioseal right femoral artery    Findings: Normal left main, proximal LAD eccentric lesion 80%, patent stent at the mid region, distal to stent 70% stenosis, patent stent in the proximal circumflex, no significant disease in the RCA, normal LV function, no gradient across the aortic valve    Violette Norwood MD  Date: 5/10/2019  Time: 5:10 PM

## 2019-05-10 NOTE — H&P
[facility]      HISTORY AND PHYSICAL EXAMINATION            Date:   5/10/2019  Patient name:  Launie Mohs  Date of admission:  5/10/2019  5:49 PM  MRN:   [id number]  Account:  [de-identified]  YOB: 1940  PCP:    PHILIP Salcido CNP  Room:   3004/3004-01  Code Status:    Full Code    Chief Complaint:     Unstable angina     History Obtained From:     patient, electronic medical record    History of Present Illness: The patient is a 77 y.o. / female who is admitted in the hospital after cardiac cath and LAD stents x 2. she was admitted to [facility] for the management of  Unstable angina. Patient had sudden onset chest pain in middle of night and was substernal and radiated to Jaw. Initial evaluation showed elevated troponins . EKG showed new onset Afib . Patient was started on heparin infusion and transferred to [facility] for cardiac cath. Patient underwent LAD stent x 2 . She had previous stent x 4 . other comorbidities include DM2 on insulin, hypertension, hyperlipidemia , Multiple myeloma. She also follows [facility] and has been diagnosed amyloidosis. Currently chest pain free . Remains in Afib with controlled HR. Past Medical History:     Past Medical History:   Diagnosis Date    Allergic rhinitis     CAD (coronary artery disease)     s/p stents  total x5    Cholelithiasis     Chronic cough 9/14/2011    Chronic cystitis 9/14/2011    Colitis 09/2016    GERD 9/14/2011    Gout     Guillain-[place] ([facility]     history of     Hyperlipidemia     Hypertension     Irritable bowel syndrome     Migraines     hx. of    Multiple myeloma ([facility]     OA (osteoarthritis) 9/14/2011    Osteoarthritis     Osteopenia     Pneumonia     Post-menopausal     vaginal atrophy    Pulmonary nodules     Raynaud's disease     S/P cardiac cath     x4    Sleep apnea     no machine at night.     Thyroid disease     Hypothyroid    Type 2 BY MOUTH ONE TIME A DAY  3/21/19   Joan Tavarez MD   magnesium oxide (MAG-OX) 400 (241.3 Mg) MG TABS tablet Take 1 tablet by mouth 2 times daily for 15 days 3/19/19 5/9/19  Joan Tavarez MD   pyridoxine (B-6) 50 MG tablet Take 50 mg by mouth 12/10/18   Historical Provider, MD   pantoprazole (PROTONIX) 20 MG tablet Take 1 tablet by mouth daily 11/2/18   Merl Been, PHILIP Mi CNP   ondansetron (ZOFRAN ODT) 4 MG disintegrating tablet Take 1 tablet by mouth every 8 hours as needed for Nausea 10/27/18   James Jewell MD   allopurinol (ZYLOPRIM) 300 MG tablet TAKE 1 TABLET BY MOUTH ONE TIME A DAY  9/4/18   Merl Been, PHILIP Mi CNP   atorvastatin (LIPITOR) 20 MG tablet TAKE 1 TABLET BY MOUTH ONE TIME A DAY  8/15/18   Merl Been, PHILIP Mi CNP   bortezomib (VELCADE) 3.5 MG chemo innjection Sig: once a week injection  Patient taking differently: Sig: every other week injection 6/5/18   Merl Been, PHILIP Mi CNP   insulin aspart (NOVOLOG) 100 UNIT/ML injection vial Inject 8 Units into the skin 3 times daily (before meals)     Historical Provider, MD   b complex vitamins capsule Take 1 capsule by mouth daily OTC    Historical Provider, MD   ondansetron (ZOFRAN) 8 MG tablet Take 8 mg by mouth every 8 hours as needed for Nausea or Vomiting    Historical Provider, MD   acyclovir (ZOVIRAX) 200 MG capsule Take 200 mg by mouth 2 times daily  7/20/17   Historical Provider, MD   albuterol sulfate HFA (PROVENTIL HFA) 108 (90 BASE) MCG/ACT inhaler Inhale 2 puffs into the lungs every 6 hours as needed for Wheezing 3/31/17 6/15/19  Merl Been, PHILIP Mi CNP   fluticasone-salmeterol (ADVAIR DISKUS) 100-50 MCG/DOSE diskus inhaler INHALE ONE PUFF BY MOUTH EVERY 12 HOURS 3/9/17   Merl Been, PHILIP Mi CNP   clopidogrel (PLAVIX) 75 MG tablet Take 75 mg by mouth daily    Historical Provider, MD   nitroGLYCERIN (NITROSTAT) 0.4 MG SL tablet Place 1 tablet under the tongue every 5 minutes as needed for Chest pain 7/21/15   [place] Rao EULOGIO KeenanN - CNP   Biotin 5000 MCG TABS Take  by mouth daily. Historical Provider, MD   aspirin 81 MG EC tablet Take 81 mg by mouth daily. Historical Provider, MD        Allergies:     Hepatitis b virus vaccine; Norvasc [amlodipine besylate]; Tramadol; Fentanyl; Percocet [oxycodone-acetaminophen]; and Velcade [bortezomib]    Social History:     Tobacco:    reports that she has never smoked. She has never used smokeless tobacco.  Alcohol:      reports that she drinks about 0.6 oz of alcohol per week. Drug Use:  reports that she does not use drugs. Family History:     Family History   Problem Relation Age of Onset    Diabetes Mother     Hypertension Mother     Heart Disease Mother    [place] Stroke Mother     Hypertension Father     Heart Disease Father     Stroke Father     Hypertension Sister     Rheum Arthritis Sister     Diabetes Brother     Hypertension Brother     Kidney Disease Brother     Breast Cancer Daughter     Cancer Daughter         breast    Hypertension Brother     Crohn's Disease Son     Rheum Arthritis Sister        Review of Systems:     Positive and Negative as described in HPI. Review of Systems   Constitutional: Negative for activity change, appetite change, chills, fatigue, fever and unexpected weight change. HENT: Negative for congestion, mouth sores, postnasal drip, sinus pressure, sore throat and voice change. Eyes: Negative for visual disturbance. Respiratory: Negative for cough, shortness of breath and wheezing. Cardiovascular: Negative for chest pain and palpitations. Gastrointestinal: Negative for abdominal pain, blood in stool, constipation, diarrhea, nausea and vomiting. Endocrine: Negative for polyuria. Genitourinary: Negative for difficulty urinating, dysuria, frequency and urgency. Musculoskeletal: Negative for arthralgias, joint swelling and myalgias.    Neurological: Negative for dizziness, tremors, speech difficulty, light-headedness and MD  5/10/2019    Copy sent to Dr. Toi Cote, PHILIP - CNP    (Please note that portions of this note were completed with a voice recognition program. Efforts were made to edit the dictations but occasionally words are mis-transcribed.)

## 2019-05-11 VITALS
DIASTOLIC BLOOD PRESSURE: 53 MMHG | HEIGHT: 61 IN | RESPIRATION RATE: 18 BRPM | BODY MASS INDEX: 38.14 KG/M2 | WEIGHT: 202 LBS | SYSTOLIC BLOOD PRESSURE: 106 MMHG | TEMPERATURE: 98.3 F | OXYGEN SATURATION: 97 % | HEART RATE: 76 BPM

## 2019-05-11 LAB
ANION GAP SERPL CALCULATED.3IONS-SCNC: 10 MMOL/L (ref 9–17)
BUN BLDV-MCNC: 29 MG/DL (ref 8–23)
BUN/CREAT BLD: ABNORMAL (ref 9–20)
CALCIUM SERPL-MCNC: 9.2 MG/DL (ref 8.6–10.4)
CHLORIDE BLD-SCNC: 102 MMOL/L (ref 98–107)
CO2: 22 MMOL/L (ref 20–31)
CREAT SERPL-MCNC: 1.43 MG/DL (ref 0.5–0.9)
GFR AFRICAN AMERICAN: 43 ML/MIN
GFR NON-AFRICAN AMERICAN: 35 ML/MIN
GFR SERPL CREATININE-BSD FRML MDRD: ABNORMAL ML/MIN/{1.73_M2}
GFR SERPL CREATININE-BSD FRML MDRD: ABNORMAL ML/MIN/{1.73_M2}
GLUCOSE BLD-MCNC: 125 MG/DL (ref 65–105)
GLUCOSE BLD-MCNC: 223 MG/DL (ref 70–99)
HCT VFR BLD CALC: 29.7 % (ref 36.3–47.1)
HEMOGLOBIN: 9.8 G/DL (ref 11.9–15.1)
LV EF: 35 %
LVEF MODALITY: NORMAL
MCH RBC QN AUTO: 31.3 PG (ref 25.2–33.5)
MCHC RBC AUTO-ENTMCNC: 33 G/DL (ref 28.4–34.8)
MCV RBC AUTO: 94.9 FL (ref 82.6–102.9)
NRBC AUTOMATED: 0.4 PER 100 WBC
PDW BLD-RTO: 14.6 % (ref 11.8–14.4)
PLATELET # BLD: 152 K/UL (ref 138–453)
PMV BLD AUTO: 10.5 FL (ref 8.1–13.5)
POTASSIUM SERPL-SCNC: 4.7 MMOL/L (ref 3.7–5.3)
POTASSIUM SERPL-SCNC: 6 MMOL/L (ref 3.7–5.3)
RBC # BLD: 3.13 M/UL (ref 3.95–5.11)
SODIUM BLD-SCNC: 134 MMOL/L (ref 135–144)
WBC # BLD: 5.6 K/UL (ref 3.5–11.3)

## 2019-05-11 PROCEDURE — 6370000000 HC RX 637 (ALT 250 FOR IP): Performed by: FAMILY MEDICINE

## 2019-05-11 PROCEDURE — 6370000000 HC RX 637 (ALT 250 FOR IP): Performed by: NURSE PRACTITIONER

## 2019-05-11 PROCEDURE — 85027 COMPLETE CBC AUTOMATED: CPT

## 2019-05-11 PROCEDURE — 99239 HOSP IP/OBS DSCHRG MGMT >30: CPT | Performed by: FAMILY MEDICINE

## 2019-05-11 PROCEDURE — 82947 ASSAY GLUCOSE BLOOD QUANT: CPT

## 2019-05-11 PROCEDURE — 84132 ASSAY OF SERUM POTASSIUM: CPT

## 2019-05-11 PROCEDURE — 6370000000 HC RX 637 (ALT 250 FOR IP): Performed by: INTERNAL MEDICINE

## 2019-05-11 PROCEDURE — 36415 COLL VENOUS BLD VENIPUNCTURE: CPT

## 2019-05-11 PROCEDURE — 2580000003 HC RX 258: Performed by: INTERNAL MEDICINE

## 2019-05-11 PROCEDURE — 94640 AIRWAY INHALATION TREATMENT: CPT

## 2019-05-11 PROCEDURE — 80048 BASIC METABOLIC PNL TOTAL CA: CPT

## 2019-05-11 RX ORDER — GABAPENTIN 100 MG/1
100 CAPSULE ORAL 3 TIMES DAILY
Status: DISCONTINUED | OUTPATIENT
Start: 2019-05-11 | End: 2019-05-11 | Stop reason: HOSPADM

## 2019-05-11 RX ORDER — GABAPENTIN 100 MG/1
100 CAPSULE ORAL 3 TIMES DAILY
Qty: 90 CAPSULE | Refills: 3 | Status: SHIPPED | OUTPATIENT
Start: 2019-05-11 | End: 2019-08-13 | Stop reason: DRUGHIGH

## 2019-05-11 RX ORDER — GABAPENTIN 100 MG/1
100 CAPSULE ORAL 3 TIMES DAILY
Qty: 90 CAPSULE | Refills: 3 | Status: SHIPPED | OUTPATIENT
Start: 2019-05-11 | End: 2019-05-11

## 2019-05-11 RX ORDER — SODIUM POLYSTYRENE SULFONATE 15 G/60ML
30 SUSPENSION ORAL; RECTAL ONCE
Status: COMPLETED | OUTPATIENT
Start: 2019-05-11 | End: 2019-05-11

## 2019-05-11 RX ADMIN — ACYCLOVIR 200 MG: 200 CAPSULE ORAL at 09:09

## 2019-05-11 RX ADMIN — SODIUM POLYSTYRENE SULFONATE 30 G: 15 SUSPENSION ORAL; RECTAL at 07:00

## 2019-05-11 RX ADMIN — MOMETASONE FUROATE AND FORMOTEROL FUMARATE DIHYDRATE 2 PUFF: 100; 5 AEROSOL RESPIRATORY (INHALATION) at 08:26

## 2019-05-11 RX ADMIN — INSULIN LISPRO 40 UNITS: 100 INJECTION, SUSPENSION SUBCUTANEOUS at 11:34

## 2019-05-11 RX ADMIN — INSULIN LISPRO 45 UNITS: 100 INJECTION, SUSPENSION SUBCUTANEOUS at 08:17

## 2019-05-11 RX ADMIN — CARVEDILOL 3.12 MG: 3.12 TABLET, FILM COATED ORAL at 09:08

## 2019-05-11 RX ADMIN — INSULIN LISPRO 8 UNITS: 100 INJECTION, SOLUTION INTRAVENOUS; SUBCUTANEOUS at 11:34

## 2019-05-11 RX ADMIN — GABAPENTIN 100 MG: 100 CAPSULE ORAL at 13:31

## 2019-05-11 RX ADMIN — INSULIN LISPRO 8 UNITS: 100 INJECTION, SOLUTION INTRAVENOUS; SUBCUTANEOUS at 07:01

## 2019-05-11 RX ADMIN — TORSEMIDE 20 MG: 20 TABLET ORAL at 09:12

## 2019-05-11 RX ADMIN — SODIUM CHLORIDE, PRESERVATIVE FREE 10 ML: 5 INJECTION INTRAVENOUS at 09:14

## 2019-05-11 RX ADMIN — ASPIRIN 81 MG: 81 TABLET ORAL at 09:09

## 2019-05-11 RX ADMIN — SPIRONOLACTONE 25 MG: 25 TABLET ORAL at 09:08

## 2019-05-11 RX ADMIN — ALLOPURINOL 300 MG: 300 TABLET ORAL at 09:09

## 2019-05-11 RX ADMIN — PANTOPRAZOLE SODIUM 20 MG: 20 TABLET, DELAYED RELEASE ORAL at 09:08

## 2019-05-11 RX ADMIN — CLOPIDOGREL 75 MG: 75 TABLET, FILM COATED ORAL at 09:08

## 2019-05-11 RX ADMIN — GABAPENTIN 100 MG: 100 CAPSULE ORAL at 09:08

## 2019-05-11 RX ADMIN — SODIUM CHLORIDE: 9 INJECTION, SOLUTION INTRAVENOUS at 09:19

## 2019-05-11 RX ADMIN — LEVOTHYROXINE SODIUM 75 MCG: 75 TABLET ORAL at 07:00

## 2019-05-11 RX ADMIN — MAGNESIUM GLUCONATE 500 MG ORAL TABLET 400 MG: 500 TABLET ORAL at 09:08

## 2019-05-11 RX ADMIN — APIXABAN 2.5 MG: 2.5 TABLET, FILM COATED ORAL at 11:35

## 2019-05-11 ASSESSMENT — ENCOUNTER SYMPTOMS
ABDOMINAL PAIN: 0
WHEEZING: 0
DIARRHEA: 0
VOICE CHANGE: 0
COUGH: 0
SHORTNESS OF BREATH: 0
SORE THROAT: 0
SINUS PRESSURE: 0
VOMITING: 0
CONSTIPATION: 0
BLOOD IN STOOL: 0
NAUSEA: 0

## 2019-05-11 ASSESSMENT — PAIN SCALES - GENERAL
PAINLEVEL_OUTOF10: 0

## 2019-05-11 NOTE — FLOWSHEET NOTE
Pressure gradually released from right groin. Site with ecchymosis. Gauze and tegaderm placed on puncture site. No active bleeding.

## 2019-05-11 NOTE — PROGRESS NOTES
(coronary artery disease)     s/p stents  total x5    Cholelithiasis     Chronic cough 9/14/2011    Chronic cystitis 9/14/2011    Colitis 09/2016    GERD 9/14/2011    Gout     Guillain-[place] ([facility]     history of     Hyperlipidemia     Hypertension     Irritable bowel syndrome     Migraines     hx. of    Multiple myeloma ([facility]     OA (osteoarthritis) 9/14/2011    Osteoarthritis     Osteopenia     Pneumonia     Post-menopausal     vaginal atrophy    Pulmonary nodules     Raynaud's disease     S/P cardiac cath     x4    Sleep apnea     no machine at night.  Thyroid disease     Hypothyroid    Type 2 diabetes mellitus 9/14/2011    Type II or unspecified type diabetes mellitus without mention of complication, not stated as uncontrolled       Allergies: Allergies   Allergen Reactions    Hepatitis B Virus Vaccine Other (See Comments)     Gu[place] (toxic reaction)    Norvasc [Amlodipine Besylate]      Swelling. COMMON SIDE EFFECT      Tramadol      Other reaction(s):  Intolerance  Seizures felt to be related    Fentanyl Nausea And Vomiting    Percocet [Oxycodone-Acetaminophen] Nausea And Vomiting    Velcade [Bortezomib] Diarrhea and Rash      Medications :    aspirin 81 mg Oral Daily   clopidogrel 75 mg Oral Daily   mometasone-formoterol 2 puff Inhalation BID   acyclovir 200 mg Oral BID   insulin lispro 8 Units Subcutaneous TID AC   allopurinol 300 mg Oral Daily   pantoprazole 20 mg Oral Daily   magnesium oxide 400 mg Oral BID   spironolactone 25 mg Oral Daily   carvedilol 3.125 mg Oral BID   torsemide 40 mg Oral Once per day on Mon Wed Fri   torsemide 20 mg Oral Once per day on Sun Tue Thu Sat   levothyroxine 75 mcg Oral Daily   insulin lispro protamine & lispro 45 Units Subcutaneous Daily with breakfast   insulin lispro protamine & lispro 40 Units Subcutaneous Lunch   insulin lispro protamine & lispro 58 Units Subcutaneous Dinner   sodium chloride flush 10 mL Intravenous 2 times per day   atorvastatin 80 mg Oral Nightly       Review of Systems   Review of Systems   Constitutional: Negative for activity change, appetite change, chills, fatigue, fever and unexpected weight change. HENT: Negative for congestion, mouth sores, postnasal drip, sinus pressure, sore throat and voice change. Eyes: Negative for visual disturbance. Respiratory: Negative for cough, shortness of breath and wheezing. Cardiovascular: Negative for chest pain and palpitations. Gastrointestinal: Negative for abdominal pain, blood in stool, constipation, diarrhea, nausea and vomiting. Endocrine: Negative for polyuria. Genitourinary: Negative for difficulty urinating, dysuria, frequency and urgency. Musculoskeletal: Negative for arthralgias, joint swelling and myalgias. Neurological: Negative for dizziness, tremors, speech difficulty, light-headedness and headaches.      Objective :      Current Vitals : Temp: 97.7 °F (36.5 °C),  Pulse: 104, Resp: 22, BP: (!) 119/45, SpO2: 96 %  Last 24 Hrs Vitals   Patient Vitals for the past 24 hrs:   BP Temp Temp src Pulse Resp SpO2 Height Weight   05/11/19 0332 (!) 119/45 97.7 °F (36.5 °C) Oral 104 22 -- -- --   05/11/19 0257 123/81 -- -- 89 -- -- -- --   05/11/19 0020 123/81 97.7 °F (36.5 °C) Oral 89 17 -- -- --   05/11/19 0015 -- -- -- 89 19 -- -- --   05/11/19 0000 (!) 123/51 -- -- 88 19 -- -- --   05/10/19 2345 -- -- -- 88 19 -- -- --   05/10/19 2330 -- -- -- 88 21 -- -- --   05/10/19 2315 -- -- -- 87 20 -- -- --   05/10/19 2300 120/60 -- -- 91 16 -- -- --   05/10/19 2245 -- -- -- 92 17 -- -- --   05/10/19 2230 -- -- -- 86 24 -- -- --   05/10/19 2215 -- -- -- 85 19 -- -- --   05/10/19 2200 (!) 131/97 -- -- 92 22 -- -- --   05/10/19 2145 -- -- -- 90 21 -- -- --   05/10/19 2130 124/74 -- -- 89 22 -- -- --   05/10/19 2115 -- -- -- 87 21 -- -- --   05/10/19 2100 (!) 126/58 -- -- 85 25 -- -- --   05/10/19 2045 -- -- -- 97 24 -- -- --   05/10/19 2030 (!) 105/54 -- -- 94 15 -- -- --   05/10/19 2015 -- -- -- 94 16 -- -- --   05/10/19 2000 126/78 97.7 °F (36.5 °C) Oral 101 21 -- -- --   05/10/19 1945 -- -- -- 92 19 -- -- --   05/10/19 1930 (!) 111/94 97.2 °F (36.2 °C) Oral 90 16 -- -- --   05/10/19 1915 -- -- -- 91 19 -- -- --   05/10/19 1900 120/61 -- -- 92 16 -- -- --   05/10/19 1832 130/61 -- -- 92 20 -- -- --   05/10/19 1815 136/66 -- -- 92 18 -- -- --   05/10/19 1800 (!) 130/56 -- -- 90 16 -- -- --   05/10/19 1755 (!) 115/57 -- -- 91 15 -- -- --   05/10/19 1747 (!) 117/59 97.7 °F (36.5 °C) Oral 86 17 96 % -- --   05/10/19 1254 124/64 -- -- 92 18 -- -- --   05/10/19 1158 (!) 144/43 97.9 °F (36.6 °C) Oral 86 14 -- 5' 1\" (1.549 m) 202 lb (91.6 kg)     Intake / output   05/10 0701 - 05/11 0700  In: 942 [P.O.:240; I.V.:675]  Out: -   Physical Exam:  Physical Exam   Constitutional: She is oriented to person, place, and time. She appears well-developed and well-nourished. No distress. HENT:   Mouth/Throat: No oropharyngeal exudate. Eyes: Pupils are equal, round, and reactive to light. Conjunctivae are normal. No scleral icterus. Neck: No JVD present. No thyromegaly present. Cardiovascular: Normal rate, regular rhythm and normal heart sounds. Exam reveals no gallop and no friction rub. No murmur heard. Pulmonary/Chest: Effort normal. No respiratory distress. She has no wheezes. She has no rales. Abdominal: Soft. Bowel sounds are normal. She exhibits no distension and no mass. There is no tenderness. There is no rebound and no guarding. Musculoskeletal:   No groin hematoma. No tenderness. Lymphadenopathy:     She has no cervical adenopathy. Neurological: She is alert and oriented to person, place, and time. No cranial nerve deficit. She exhibits normal muscle tone. Skin: She is not diaphoretic. Nursing note and vitals reviewed.     Lower Extremities : No ankle Edema , No calf Tenderness     Laboratory findings:    Recent Labs     05/09/19  0211 05/09/19  1237 05/11/19  0510   WBC 4.9 4.1 5.6   HGB 11.5* 11.8* 9.8*   HCT 35.3* 36.3 29.7*    164 152   INR 1.2 1.1  --      Recent Labs     05/09/19  0211 05/11/19  0510   * 134*   K 4.8 6.0*   CL 94* 102   CO2 27 22   GLUCOSE 140* 223*   BUN 31* 29*   CREATININE 1.60* 1.43*   CALCIUM 9.8 9.2     Recent Labs     05/09/19  0211   PROT 7.6   LABALBU 3.9   AST 29   ALT 20   ALKPHOS 136*   BILITOT 0.35          Specific Gravity, UA   Date Value Ref Range Status   03/22/2019 1.020 1.005 - 1.030 Final     Protein, UA   Date Value Ref Range Status   03/22/2019 Positive (A) Negative Final     Comment:     30 mg     RBC, UA   Date Value Ref Range Status   01/18/2019 0 TO 2 /HPF Final     Blood, Urine   Date Value Ref Range Status   03/22/2019 Negative  Final     Bacteria, UA   Date Value Ref Range Status   01/18/2019 FEW (A) NONE Final     Nitrite, Urine   Date Value Ref Range Status   03/22/2019 Negative  Final     WBC, UA   Date Value Ref Range Status   01/18/2019 0 TO 2 /HPF Final     Leukocyte Esterase, Urine   Date Value Ref Range Status   01/18/2019 NEGATIVE NEG Final     Leukocytes, UA   Date Value Ref Range Status   03/22/2019 neg  Final     Imaging/Diagonstics:     Cardiac cath - 5/10/19  PTCA and DARREN of proximal LAD and DARREN placement of distal LAD.      Echo 5/9/19  EF > 55 % , LA moderately dilated, mild MR, Mild TR, AV sclerotic . Clinical Course : stable  Assessment and Plan  :        1. Unstable angina - s/p LAD stent x 2 - asa 81 , plavix 75 , lipitor 80 nightly, Coreg   2. New onset Afib - continue Coreg - eliquis 2.5 mg BID   3. Type 2 DM - last A1C 8.2 - on insulin -   4. Essential Hypertension - well controlled   5. Hyperlipidemia - increased Lipitor to 80 mg Nightly . 6. Hypothyroidism - on synthroid. 7. Gout   8. Multiple myeloma   9. Raynaud's syndrome   10. Peripheral neuropathy - started gabapentin -   11. Hypokalemia - improved with kayexalate . Stop aldactone .           Discharge home Plan and updates discussed with patient ,  answers  explained to satisfaction.    Plan discussed with Staff Lizz Best RN     (Please note that portions of this note were completed with a voice recognition program. Efforts were made to edit the dictations but occasionally words are mis-transcribed.)      Manuel Agrawal MD  5/11/2019

## 2019-05-11 NOTE — PROGRESS NOTES
Date:   5/11/2019  Patient name: Luis Angel Traore  Date of admission:  5/10/2019  5:49 PM  MRN:   [id number]  YOB: 1940  PCP: PHILIP Shelley CNP    Reason for Admission: Chest pain [R07.9]  NSTEMI (non-ST elevated myocardial infarction) [facility]) [I21.4]    Cardiology follow-up : NSTEMI, stent placement proximal LAD and distal LAD        History of presenting illness: 27-year-old  female who has past medical history of diabetes mellitus, hypertension, hyperlipidemia and multiple coronary intervention admitted on 5/9/2019 with the severe upper chest pain radiating to the jaw. She had this chest pain at rest.  She took nitroglycerin ×2 and 2 baby aspirin. She has been having exertional tiredness/fatigue during her activities of daily living and she also has been having chest pain over the last couple months. Chest pain is not related to her exertion. No history of syncope. No palpitation.   No previous history of cardiac arrhythmias.        Labs on admission showed sodium 133, potassium 4.8, chloride 94, CO2 27, BUN 31, creatinine 1.60, GFR 31, glucose 140 calcium 9.8, high sensitivity troponin 28, albumin 3.1  WBC 4.9, hemoglobin 11.5, MCV 96.5, platelets 260  INR 1.2, PTT 44.2, PT 14.7     Chest x-ray 5/9/2019 showed a mild bibasilar heterogenous opacities could represent subsegmental atelectasis or developing infection/inflammatory process  ECG on admission showed A. fib, heart rate 80-90  2-D echo examination showed normal LV size and function ejection fraction 60%, no significant pericardial effusion, no significant valvular abnormality    Past medical history:  Coronary artery disease, stent placement ×5 at [address] [address] and [facility], last stent 3004  Diastolic CHF  Hypertension  Hyperlipidemia  Chronic kidney disease is stage III  Multiple myeloma patient has received chemotherapy at [facility] clinic diagnosed in 2016  Amyloidosis  Morbid obesity BMI 37     Past surgical history includes cholecystectomy, umbilical hernia repair January 2019 mild appendectomy 2012     Systemic review: No history of GI bleeding, no TIA or CVA  Drug allergy: fentanyl, Percocet    BP (!) 123/58   Pulse 69   Temp 97.5 °F (36.4 °C) (Oral)   Resp 17   Ht 5' 1\" (1.549 m)   Wt 202 lb (91.6 kg)   SpO2 99%   BMI 38.17 kg/m²       Medications:   Scheduled Meds:   gabapentin  100 mg Oral TID    aspirin  81 mg Oral Daily    clopidogrel  75 mg Oral Daily    mometasone-formoterol  2 puff Inhalation BID    acyclovir  200 mg Oral BID    insulin lispro  8 Units Subcutaneous TID AC    allopurinol  300 mg Oral Daily    pantoprazole  20 mg Oral Daily    magnesium oxide  400 mg Oral BID    spironolactone  25 mg Oral Daily    carvedilol  3.125 mg Oral BID    torsemide  40 mg Oral Once per day on Mon Wed Fri    torsemide  20 mg Oral Once per day on Sun Tue Thu Sat    levothyroxine  75 mcg Oral Daily    insulin lispro protamine & lispro  45 Units Subcutaneous Daily with breakfast    insulin lispro protamine & lispro  40 Units Subcutaneous Lunch    insulin lispro protamine & lispro  58 Units Subcutaneous Dinner    sodium chloride flush  10 mL Intravenous 2 times per day    atorvastatin  80 mg Oral Nightly     Continuous Infusions:   sodium chloride      sodium chloride 75 mL/hr at 05/10/19 1858     CBC:   Recent Labs     05/09/19  0211 05/09/19  1237 05/11/19  0510   WBC 4.9 4.1 5.6   HGB 11.5* 11.8* 9.8*    164 152     BMP:    Recent Labs     05/09/19  0211 05/11/19  0510   * 134*   K 4.8 6.0*   CL 94* 102   CO2 27 22   BUN 31* 29*   CREATININE 1.60* 1.43*   GLUCOSE 140* 223*     Hepatic:   Recent Labs     05/09/19 0211   AST 29   ALT 20   BILITOT 0.35   ALKPHOS 136*     Troponin: No results for input(s): TROPONINI in the last 72 hours. BNP: No results for input(s): BNP in the last 72 hours. Lipids: No results for input(s): CHOL, HDL in the last 72 hours.     Invalid input(s): LDLCALCU  INR:   Recent Labs     05/09/19  0211 05/09/19  1237   INR 1.2 1.1       Objective:   Vitals: BP (!) 123/58   Pulse 69   Temp 97.5 °F (36.4 °C) (Oral)   Resp 17   Ht 5' 1\" (1.549 m)   Wt 202 lb (91.6 kg)   SpO2 99%   BMI 38.17 kg/m²   General appearance: alert and cooperative with exam  HEENT: {Exam; heent:51177}  Neck: {neck exam:62856::\"no adenopathy\",\"no carotid bruit\",\"no JVD\",\"supple, symmetrical, trachea midline\",\"thyroid not enlarged, symmetric, no tenderness/mass/nodules\"}  Lungs: {lung exam:84291::\"clear to auscultation bilaterally\"}  Heart: {heart exam:5510::\"regular rate and rhythm, S1, S2 normal, no murmur, click, rub or gallop\"}  Abdomen: {abdominal exam:44204::\"soft, non-tender; bowel sounds normal; no masses,  no organomegaly\"}  Extremities: {extremity exam:5109::\"extremities normal, atraumatic, no cyanosis or edema\"}  Neurologic: Mental status: {Exam; neuro mental status:15700::\"Alert, oriented, thought content appropriate\"}    EKG: {ekg findings:540804::\"normal EKG, normal sinus rhythm\",\"unchanged from previous tracings\"}. ECHO: {obtained/reviewed:08821}. Ejection fraction: {NUMBERS BY 5:18566}%  Stress Test: {obtained/reviewed:28418}. Cardiac Angiography: {obtained/reviewed:98594}.         Assessment / Acute Cardiac Problems:   Patient admitted with the retrosternal chest pain today radiating to the jaw  No ischemic ECG changes, borderline abnormal troponin high sensitivity  History of recurrent chest pain ongoing for more than 2 months  New onset A. fib heart rate under control    Cardiac cath 5/10/2019 normal left main, proximal LAD 80% and distal LAD 70%, patent stents at mid region, nose significant disease in the RCA, patent stent in the circumflex, normal EF 60%  Drug-eluting stent placement proximal LAD and distal LAD, patient had jaw pain during balloon inflation        History of CAD, stent placement ×5, last stent 2013 at [facility], hyperlipidemia  Hypothyroidism  Morbid obesity, BMI 37  Diabetes mellitus on insulin  Chronic kidney disease is stage III serum creatinine 1.6  History of multiple myeloma, status post chemo, patient follow-up at [place] clinic  History of amyloidosis        Patient Active Problem List:     Chronic cystitis     Coronary artery disease involving native coronary artery of native heart without angina pectoris     GERD     Chronic cough     OA (osteoarthritis)     LV dysfunction     Edema     Microalbuminuria     Hypothyroidism     Osteopenia     Mild carotid artery disease (HCC)     Chronic gout without tophus     S/P coronary artery stent placement     Chronic renal insufficiency     Pure hypercholesterolemia     Cervical pain (neck)     Paraproteinemia     Colitis     Morbid obesity due to excess calories (HCC)     Multiple myeloma (HCC)     JAMES (obstructive sleep apnea)     Type 2 diabetes mellitus without complication, with long-term current use of insulin ([address]     Essential hypertension     Fall at home     Dyspnea on exertion     Vitamin D deficiency     Anemia     Pain of left lower leg     Cardiac amyloidosis (HCC)     Cardiomyopathy, nonischemic (HCC)     Chronic diastolic CHF (congestive heart failure) ([address]     Examination of participant in clinical trial     Organ-limited amyloidosis ([address]     Anemia     Arteriosclerosis of arteries of extremities (HCC)     Intermittent claudication ([address]     Thrush, oral     Ambulates with cane     Chest pain     Obesity, Class II, BMI 35-39.9     New onset atrial fibrillation (HCC)     NSTEMI (non-ST elevated myocardial infarction) ([address]      Plan of Treatment:       Electronically signed by Malik Hampton MD on 5/11/2019 at 8:13 AM

## 2019-05-11 NOTE — DISCHARGE SUMMARY
[facility]      Discharge Summary     Patient ID: Natacha Harrington  :  1940   MRN: [id number]     ACCOUNT:  [de-identified]   Patient Location : [address] [place] [address]  Patient's PCP: PHILIP Huynh Che, CNP  Admit Date: 5/10/2019   Discharge Date:  19     Length of Stay: 1  Code Status:  Full Code  Admitting Physician: Jacqui Hargrove MD  Discharge Physician: Kyung Ashley MD     Active Discharge Diagnosis :     Primary Problem  NSTEMI (non-ST elevated myocardial infarction) [facility])      [facility] Problems    Diagnosis Date Noted    [facility]non[facility]ST elevated [facility]) [facility] [I21.4] 05/10/2019     Priority: High    Chest pain [R07.9] 2019    New onset atrial fibrillation ([facility] [I48.91] 2019    Essential hypertension [I10] 2017    Multiple myeloma ([facility] [C90.00] 2017    Chronic renal insufficiency [N18.9] 2016    Chronic gout without tophus [M1A.9XX0] 2015    Hypothyroidism [E03.9] 2012       Admission Condition:  fair     Discharged Condition: stable    Hospital Stay:     Hospital Course:  Natacha Harrington is a 77 y.o. female who was admitted for the management of   NSTEMI (non-ST elevated myocardial infarction) ([facility] . Patient presented to hospital after cardiac cath and LAD stents x 2. She was admitted to [facility] for the management of  Unstable angina. Patient had sudden onset chest pain in middle of night and was substernal and radiated to Jaw. Initial evaluation showed elevated troponins . EKG showed new onset Afib . Patient was started on heparin infusion and transferred to [facility] for cardiac cath. Patient underwent LAD stent x 2 . She had previous stent x 4 . other comorbidities include DM2 on insulin, hypertension, hyperlipidemia , Multiple myeloma. She also follows [facility] and has been diagnosed amyloidosis. Currently chest pain free.  Remains in Afib with controlled HR.       Significant therapeutic interventions:     1. Unstable angina - s/p LAD stent x 2 - asa 81 , plavix 75 , lipitor 80 nightly, Coreg   2. New onset Afib - continue Coreg - eliquis 2.5 mg BID   3. Type 2 DM - last A1C 8.2 - on insulin -   4. Essential Hypertension - well controlled   5. Hyperlipidemia - increased Lipitor to 80 mg Nightly . 6. Hypothyroidism - on synthroid. 7. Gout   8. Multiple myeloma   9. Raynaud's syndrome   10. Peripheral neuropathy - started gabapentin -   11. Hypokalemia - improved with kayexalate . Stop aldactone .           Significant Diagnostic Studies:   Labs / Micro:/Radiology  Recent Labs     05/11/19  0510 05/09/19  1237 05/09/19  0211   WBC 5.6 4.1 4.9   HGB 9.8* 11.8* 11.5*   HCT 29.7* 36.3 35.3*   MCV 94.9 98.3 96.5    164 155     Labs Renal Latest Ref Rng & Units 5/11/2019 5/11/2019 5/9/2019 3/18/2019 3/18/2019   BUN 8 - 23 mg/dL - 29(H) 31(H) 27(H) 26(H)   Cr 0.50 - 0.90 mg/dL - 1.43(H) 1.60(H) 1.36(H) 1.24(H)   K 3.7 - 5.3 mmol/L 4.7 6.0(HH) 4.8 3.8 3.8   Na 135 - 144 mmol/L - 134(L) 133(L) 141 141     Lab Results   Component Value Date    ALT 20 05/09/2019    AST 29 05/09/2019    ALKPHOS 136 (H) 05/09/2019    BILITOT 0.35 05/09/2019     Lab Results   Component Value Date    TSH 1.55 03/18/2019     No results found for: HAV, HEPAIGM, HEPBIGM, HEPBCAB, HBEAG, HEPCAB  Lab Results   Component Value Date    APPEARANCE CLEAR 07/05/2017    COLORU Yellow 03/22/2019    NITRU Negative 03/22/2019    GLUCOSEU 100 mg 03/22/2019    KETUA Negative 03/22/2019    UROBILINOGEN Normal 03/22/2019    BILIRUBINUR 0 03/22/2019    BILIRUBINUR neg 08/16/2016    OCBU NEGATIVE 07/05/2017     Lab Results   Component Value Date    LABA1C 8.2 03/01/2017     No results found for: EAG  Lab Results   Component Value Date    INR 1.1 05/09/2019    INR 1.2 05/09/2019    INR 1.0 09/25/2017    PROTIME 14.4 05/09/2019    PROTIME 14.7 (H) 05/09/2019    PROTIME 11.0 09/25/2017 Imaging/Diagonstics:     Cardiac cath - 5/10/19  PTCA and DARREN of proximal LAD and DARREN placement of distal LAD.      Echo 5/9/19  EF > 55 % , LA moderately dilated, mild MR, Mild TR, AV sclerotic .         Consultations:    Consults:     Final Specialist Recommendations/Findings:   IP CONSULT TO INTERNAL MEDICINE      The patient was seen and examined on day of discharge and this discharge summary is in conjunction with any daily progress note from day of discharge. Discharge plan:     Disposition: Home    Physician Follow Up:     Magy Rausch, APRN - CNP  [address] [facility]  [address] [facility]  [address] [facility] [address] [id number] [place] [address], [address]  [address] [facility]  [phone]      hospital follow up       Requiring Further Evaluation/Follow Up POST HOSPITALIZATION/Incidental Findings: Follow up with cardiology . Diet: cardiac diet    Activity: As tolerated. Instructions to Patient: medication as advised. Discharge Medications:      Medication List      START taking these medications    apixaban 2.5 MG Tabs tablet  Commonly known as:  ELIQUIS  Take 1 tablet by mouth 2 times daily     gabapentin 100 MG capsule  Commonly known as:  NEURONTIN  Take 1 capsule by mouth 3 times daily for 30 days.         CHANGE how you take these medications    bortezomib 3.5 MG chemo innjection  Commonly known as:  VELCADE  Sig: once a week injection  What changed:  additional instructions        CONTINUE taking these medications    acyclovir 200 MG capsule  Commonly known as:  ZOVIRAX     albuterol sulfate  (90 Base) MCG/ACT inhaler  Commonly known as:  PROVENTIL HFA  Inhale 2 puffs into the lungs every 6 hours as needed for Wheezing     allopurinol 300 MG tablet  Commonly known as:  ZYLOPRIM  TAKE 1 TABLET BY MOUTH ONE TIME A DAY     aspirin 81 MG EC tablet     b complex vitamins capsule     Biotin 5000 MCG Tabs     carvedilol 3.125 MG tablet  Commonly known as:  COREG     clopidogrel 75 MG tablet  Commonly known as:  PLAVIX     fluticasone-salmeterol 100-50 MCG/DOSE diskus inhaler  Commonly known as:  ADVAIR DISKUS  INHALE ONE PUFF BY MOUTH EVERY 12 HOURS     levothyroxine 75 MCG tablet  Commonly known as:  SYNTHROID     magnesium oxide 400 (241.3 Mg) MG Tabs tablet  Commonly known as:  MAG-OX  Take 1 tablet by mouth 2 times daily for 15 days     nitroGLYCERIN 0.4 MG SL tablet  Commonly known as:  NITROSTAT  Place 1 tablet under the tongue every 5 minutes as needed for Chest pain     * NOVOLIN 70/30 (70-30) 100 UNIT per ML injection vial  Generic drug:  insulin 70-30     * NOVOLIN 70/30 (70-30) 100 UNIT per ML injection vial  Generic drug:  insulin 70-30     * NOVOLIN 70/30 (70-30) 100 UNIT per ML injection vial  Generic drug:  insulin 70-30     NOVOLOG 100 UNIT/ML injection vial  Generic drug:  insulin aspart     ondansetron 4 MG disintegrating tablet  Commonly known as:  ZOFRAN ODT  Take 1 tablet by mouth every 8 hours as needed for Nausea     pantoprazole 20 MG tablet  Commonly known as:  PROTONIX  Take 1 tablet by mouth daily     pyridoxine 50 MG tablet  Commonly known as:  B-6     * torsemide 20 MG tablet  Commonly known as:  DEMADEX     * torsemide 20 MG tablet  Commonly known as:  DEMADEX     vitamin D 85119 units Caps capsule  Commonly known as:  ERGOCALCIFEROL  TAKE 1 CAPSULE BY MOUTH ONE TIME A WEEK         * This list has 5 medication(s) that are the same as other medications prescribed for you. Read the directions carefully, and ask your doctor or other care provider to review them with you.             STOP taking these medications    atorvastatin 20 MG tablet  Commonly known as:  LIPITOR     ondansetron 8 MG tablet  Commonly known as:  ZOFRAN     spironolactone 25 MG tablet  Commonly known as:  ALDACTONE           Where to Get Your Medications      These medications were sent to [address], [address] [phone] [address] [facility] [address]    Phone:  [phone]   · apixaban 2.5 MG Tabs tablet  · gabapentin 100 MG capsule         Time Spent on discharge is  39 mins in patient examination, evaluation, counseling as well as medication reconciliation, prescriptions for required medications, discharge plan and follow up. Electronically signed by   Teofilo Christopher MD  5/11/2019        Thank you Dr. Erum Hawley, PHILIP - CNP for the opportunity to be involved in this patient's care.

## 2019-05-11 NOTE — PROGRESS NOTES
Smoking Cessation - topics covered   []  Health Risks  []  Benefits of Quitting   []  Smoking Cessation  [x]  Patient has no history of tobacco use  []  Patient is former smoker. [x]  No need for tobacco cessation education. []  Booklet given  []  Patient verbalizes understanding. []  Patient denies need for tobacco cessation education. []  Unable to meet with patient today. Will follow up as able.   Jewell Sanchez  8:51 AM

## 2019-05-11 NOTE — FLOWSHEET NOTE
Assessment: Patient was awake and alert when  visited. Family was present and open to pastoral care. Patient was in good spirit and seemed to be doing well. When asked how she was feeling, patient responded: \"I am feeling better today than yesterday. \" Patient said she was raised Buddhism and a member of [employer]. Intervention:  maintained listening presence, offered support and prayed with patient and family. Patient received sacrament of anointing of the sick. Outcome: Both patient and family expressed appreciation for the prayer and anointing of the sick patient received. Follow up visits recommended for more spiritual and emotional support. 05/11/19 1207   Encounter Summary   Services provided to: Patient and family together   Support System Family members   [place] [address] Visiting   (05/11/2019)   Complexity of Encounter Moderate   Length of Encounter 30 minutes   Spiritual Assessment Completed Yes   Routine   Type Follow up   Spiritual/Pentecostal   Type Spiritual support   Assessment Calm; Approachable; Hopeful   Intervention Active listening;Nurtured hope;Prayer; Anointing   Outcome Expressed gratitude   Sacraments   Sacrament of Sick-Anointing Anointed   Advance Directives (For Healthcare)   Healthcare Directive No, patient does not have an advance directive for healthcare treatment  (Patient not ready at this time)

## 2019-05-11 NOTE — PLAN OF CARE
Problem: Falls - Risk of:  Goal: Will remain free from falls  Description  Will remain free from falls  5/11/2019 0306 by Jian Tobin RN  Outcome: Ongoing  5/11/2019 0300 by Jian Tobin RN  Outcome: Ongoing  Goal: Absence of physical injury  Description  Absence of physical injury  5/11/2019 0306 by Jian Tobin RN  Outcome: Ongoing  5/11/2019 0300 by Jian Tobin RN  Outcome: Ongoing

## 2019-05-12 ENCOUNTER — CARE COORDINATION (OUTPATIENT)
Dept: CASE MANAGEMENT | Age: 79
End: 2019-05-12

## 2019-05-12 DIAGNOSIS — I21.4 NSTEMI (NON-ST ELEVATED MYOCARDIAL INFARCTION) (HCC): Primary | ICD-10-CM

## 2019-05-12 DIAGNOSIS — I48.91 NEW ONSET ATRIAL FIBRILLATION (HCC): ICD-10-CM

## 2019-05-12 PROCEDURE — 1111F DSCHRG MED/CURRENT MED MERGE: CPT | Performed by: NURSE PRACTITIONER

## 2019-05-12 NOTE — CARE COORDINATION
Christine [facility] Transitions Initial Follow Up Call - call attempt #1 Attempted initial 24 hour transitional call to patient. Left VM to return call directly to [facility]    Call within 2 business days of discharge: Yes    Patient: Alen Matthews Patient : 1940   MRN: [id number]  Reason for Admission: NSTEMI, LAD stent, AF  Discharge Date: 19 RARS: Readmission Risk Score: 34       Spoke with: pharmacist @ [facility] - confirmed that patient is getting eliquis. Patient had called pharmacist to check on price of eliquis - $47 - plans to  according to pharmacist based on their conversation.     Facility: [facility]    Non-face-to-face services provided:  Scheduled appointment with PCP-routed for transitional appt  Scheduled appointment with Specialist-CTC will assist with scheduling with cardio  Obtained and reviewed discharge summary and/or continuity of care documents      Follow Up  Future Appointments   Date Time Provider Valerie Recinos   2019 11:00 AM [address] [place] [address], [facility]   2019 12:45 PM Blanca Rowe MD [facility] None       Williams Medley RN
cardio to schedule)  Were you discharged with any Home Care or Post Acute Services:  No  Patient DME:  Straight cane  Do you have support at home?:  Partner/Spouse/SO  Do you feel like you have everything you need to keep you well at home?:  Yes  Are you an active caregiver in your home?:  No  Care Transitions Interventions     Other Services:   (Comment: chemo)          Follow Up  Future Appointments   Date Time Provider Valerie Recinos   5/13/2019 11:00 AM Natalia Murphy DPM [facility]   7/19/2019 12:45 PM Avtar Jameson MD [facility] None       Chris Mccracken RN

## 2019-05-13 ENCOUNTER — OFFICE VISIT (OUTPATIENT)
Dept: PODIATRY | Age: 79
End: 2019-05-13
Payer: MEDICARE

## 2019-05-13 VITALS — WEIGHT: 205 LBS | HEIGHT: 61 IN | BODY MASS INDEX: 38.71 KG/M2

## 2019-05-13 DIAGNOSIS — M79.672 PAIN IN BOTH FEET: ICD-10-CM

## 2019-05-13 DIAGNOSIS — I73.9 PVD (PERIPHERAL VASCULAR DISEASE) (HCC): ICD-10-CM

## 2019-05-13 DIAGNOSIS — R60.0 EDEMA OF LOWER EXTREMITY: ICD-10-CM

## 2019-05-13 DIAGNOSIS — B35.1 DERMATOPHYTOSIS OF NAIL: ICD-10-CM

## 2019-05-13 DIAGNOSIS — E11.51 TYPE II DIABETES MELLITUS WITH PERIPHERAL CIRCULATORY DISORDER (HCC): Primary | ICD-10-CM

## 2019-05-13 DIAGNOSIS — D23.72 BENIGN NEOPLASM OF SKIN OF LOWER LIMB, INCLUDING HIP, LEFT: ICD-10-CM

## 2019-05-13 DIAGNOSIS — M79.671 PAIN IN BOTH FEET: ICD-10-CM

## 2019-05-13 DIAGNOSIS — D23.71 BENIGN NEOPLASM OF SKIN OF LOWER LIMB, INCLUDING HIP, RIGHT: ICD-10-CM

## 2019-05-13 PROCEDURE — G8427 DOCREV CUR MEDS BY ELIG CLIN: HCPCS | Performed by: PODIATRIST

## 2019-05-13 PROCEDURE — 11721 DEBRIDE NAIL 6 OR MORE: CPT | Performed by: PODIATRIST

## 2019-05-13 PROCEDURE — 1111F DSCHRG MED/CURRENT MED MERGE: CPT | Performed by: PODIATRIST

## 2019-05-13 PROCEDURE — 4040F PNEUMOC VAC/ADMIN/RCVD: CPT | Performed by: PODIATRIST

## 2019-05-13 PROCEDURE — 17110 DESTRUCTION B9 LES UP TO 14: CPT | Performed by: PODIATRIST

## 2019-05-13 PROCEDURE — 1123F ACP DISCUSS/DSCN MKR DOCD: CPT | Performed by: PODIATRIST

## 2019-05-13 PROCEDURE — G8399 PT W/DXA RESULTS DOCUMENT: HCPCS | Performed by: PODIATRIST

## 2019-05-13 PROCEDURE — 1036F TOBACCO NON-USER: CPT | Performed by: PODIATRIST

## 2019-05-13 PROCEDURE — 99213 OFFICE O/P EST LOW 20 MIN: CPT | Performed by: PODIATRIST

## 2019-05-13 PROCEDURE — 1090F PRES/ABSN URINE INCON ASSESS: CPT | Performed by: PODIATRIST

## 2019-05-13 PROCEDURE — G8417 CALC BMI ABV UP PARAM F/U: HCPCS | Performed by: PODIATRIST

## 2019-05-13 PROCEDURE — G8598 ASA/ANTIPLAT THER USED: HCPCS | Performed by: PODIATRIST

## 2019-05-13 RX ORDER — NYSTATIN 100000 [USP'U]/G
POWDER TOPICAL
COMMUNITY
End: 2019-08-07

## 2019-05-13 NOTE — PROGRESS NOTES
[facility]  Return Patient    Chief Complaint   Patient presents with    Diabetes    Nail Problem    Circulatory Problem    Callouses     left foot         Subjective: Abhishek Viera comes to clinic for Diabetes; Nail Problem; Circulatory Problem; and Callouses (left foot)    she is a diabetic and states that they check their sugars regularly. Pt currently has complaint of thickened, elongated nails that they cannot manage by themselves. Pt's primary care physician is Ana Rosa Cornelius, PHILIP - CNP   Pt's last blood sugar was 65. Painful lesions on her feet. . They have been present for 2 month(s) duration. The lesions are present on the bilateral foot. The patient has 2 lesion that is deep seated and has a painful core. Treatment has included lotion, changing shoe gear, pads. Pt has a new complaint of increased swelling to her legs. States she been in a chair for long periods of the day with her legs down. Lab Results   Component Value Date    LABA1C 8.2 03/01/2017      Complains of numbness in the feet bilat. Past Medical History:   Diagnosis Date    Allergic rhinitis     CAD (coronary artery disease)     s/p stents  total x5    Cholelithiasis     Chronic cough 9/14/2011    Chronic cystitis 9/14/2011    Colitis 09/2016    GERD 9/14/2011    Gout     Guillain-[place] ([address]     history of     Hyperlipidemia     Hypertension     Irritable bowel syndrome     Migraines     hx. of    Multiple myeloma ([address]     OA (osteoarthritis) 9/14/2011    Osteoarthritis     Osteopenia     Pneumonia     Post-menopausal     vaginal atrophy    Pulmonary nodules     Raynaud's disease     S/P cardiac cath     x4    Sleep apnea     no machine at night.     Thyroid disease     Hypothyroid    Type 2 diabetes mellitus 9/14/2011    Type II or unspecified type diabetes mellitus without mention of complication, not stated as uncontrolled        Allergies   Allergen Reactions    Hepatitis B Virus Vaccine Other (See Comments)     Guillian-Lyndora (toxic reaction)    Norvasc [Amlodipine Besylate]      Swelling. COMMON SIDE EFFECT      Tramadol      Other reaction(s): Intolerance  Seizures felt to be related    Fentanyl Nausea And Vomiting    Percocet [Oxycodone-Acetaminophen] Nausea And Vomiting    Velcade [Bortezomib] Diarrhea and Rash     Current Outpatient Medications on File Prior to Visit   Medication Sig Dispense Refill    nystatin (MYCOSTATIN) 352359 UNIT/GM powder Apply topically      apixaban (ELIQUIS) 2.5 MG TABS tablet Take 1 tablet by mouth 2 times daily 60 tablet 2    gabapentin (NEURONTIN) 100 MG capsule Take 1 capsule by mouth 3 times daily for 30 days.  90 capsule 3    carvedilol (COREG) 3.125 MG tablet Take 3.125 mg by mouth 2 times daily      torsemide (DEMADEX) 20 MG tablet Take 40 mg by mouth three times a week Indications: Monday, Wednesday, Friday; 20 mg all other days      torsemide (DEMADEX) 20 MG tablet Take 20 mg by mouth four times a week Indications: Sunday, Tuesday, Thursday, Saturday; 40 mg all other days      levothyroxine (SYNTHROID) 75 MCG tablet Take 75 mcg by mouth Daily      insulin 70-30 (NOVOLIN 70/30) (70-30) 100 UNIT per ML injection vial Inject 45 Units into the skin daily (with breakfast)      insulin 70-30 (NOVOLIN 70/30) (70-30) 100 UNIT per ML injection vial Inject 40 Units into the skin Daily with lunch      insulin 70-30 (NOVOLIN 70/30) (70-30) 100 UNIT per ML injection vial Inject 58 Units into the skin Daily with supper      vitamin D (ERGOCALCIFEROL) 45897 units CAPS capsule TAKE 1 CAPSULE BY MOUTH ONE TIME A WEEK 16 capsule 1    pyridoxine (B-6) 50 MG tablet Take 50 mg by mouth      pantoprazole (PROTONIX) 20 MG tablet Take 1 tablet by mouth daily 30 tablet 11    ondansetron (ZOFRAN ODT) 4 MG disintegrating tablet Take 1 tablet by mouth every 8 hours as needed for Nausea 20 tablet 0    allopurinol (ZYLOPRIM) 300 MG tablet extremity physical exam:  Toenail Description  Sites of Onychomycosis Involvement (Check affected area)  [x] [x] [x] [x] [x] [x] [x] [x] [x] [x]  5 4 3 2 1 1 2 3 4 5                          Right                                        Left    Thickness  [x] [x] [x] [x] [x] [x] [x] [x] [x] [x]  5 4 3 2 1 1 2 3 4 5                         Right                                        Left    Dystrophic Changes   [x] [x] [x] [x] [x] [x] [x] [x] [x] [x]  5 4 3 2 1 1 2 3 4 5                         Right                                        Left    Color   [x] [x] [x] [x] [x] [x] [x] [x] [x] [x]  5 4 3 2 1 1 2 3 4 5                          Right                                        Left    Incurvation/Ingrowin   [] [] [] [] [] [] [] [] [] []  5 4 3 2 1 1 2 3 4 5                         Right                                        Left    Inflammation/Pain   [x] [x] [x] [x] [x] [x] [x] [x] [x] [x]  5 4 3 2 1 1 2 3 4 5                         Right                                        Left      Dermatologic Exam:   Skin lesion present to the bottom of the Bilateral foot with a central core and petchaie note to the lesions periphery. Pain on palpation   Skin No rashes or nodules noted. .         Musculoskeletal:     1st MPJ ROM decreased, Bilateral.  Muscle strength 5/5, Bilateral.  Pain present upon palpation of the lesions to the affected foot.  within normal limits medial longitudinal arch, Bilateral.  Ankle ROM decreased,Bilateral.    Dorsally contracted digits present digits 2, Bilateral.     Vascular:  DP/PT pulses palpable 1/4, Bilateral.    CFT <5 seconds to digits 1-5, Bilateral .   Hair growth absent to level of digits, Bilateral.  Edema present, Bilateral.  Erythema absent, Bilateral.     DM with PVD       [x]Yes    []No    Neurological:  Sensation absent to light touch to level of digits, Bilateral.  Protective sensation present via 5.07/10g Thomas-Lars monofilament 6/10 sites, Bilateral.  Vibratory sensation absent to 1st MPJ, Bilateral.     Visual inspection:  Deformity: hammertoe deformity sharda feet  amputation: absent  Skin lesions: present - to the plantar aspect of the afftected feet  Edema: right- 2+ pitting edema, left- 2+ pitting edema    Sensory exam:  Monofilament sensation: abnormal - 6/10 via SW 5.07/10g monofilament to the plantar foot bilateral feet    Pulses: abnormal - 1/4 dorsalis pedis pulse and 1/4 Posterior tibial pulse,   Pinprick: Impaired  Proprioception: Impaired  Vibration (128 Hz): Impaired    Q7   []Yes    []No                Q8   [x]Yes    []No                     Q9   []Yes    []No        Assessment:  77 y.o. female with:   Diagnosis Orders   1. Type II diabetes mellitus with peripheral circulatory disorder (HCC)  42514 - [place] DEBRIDEMENT OF NAILS, 6 OR MORE    HM DIABETES FOOT EXAM   2. Dermatophytosis of nail  87839 - [place] DEBRIDEMENT OF NAILS, 6 OR MORE    HM DIABETES FOOT EXAM   3. PVD (peripheral vascular disease) ([facility] [address]  08491 - [place] DEBRIDEMENT OF NAILS, 6 OR MORE    HM DIABETES FOOT EXAM    04808 - [place] DESTRUCTION BENIGN LESIONS UP TO 14   4. Benign neoplasm of skin of lower limb, including hip, left  HM DIABETES FOOT EXAM    38368 - [place] DESTRUCTION BENIGN LESIONS UP TO 14   5. Benign neoplasm of skin of lower limb, including hip, right  HM DIABETES FOOT EXAM    99683 - [place] DESTRUCTION BENIGN LESIONS UP TO 14   6. Pain in both feet  41346 - [place] DEBRIDEMENT OF NAILS, 6 OR MORE    HM DIABETES FOOT EXAM    77699 - [place] DESTRUCTION BENIGN LESIONS UP TO 14   7. Edema of lower extremity         Plan:    Rx given for compression stockings to be worn during the day. Pt to elevate at night above the heart   Pt to get into bed more then the chair when sleeping. Continue PT from her other surgery    Pt was evaluated and examined. Patient was given personalized discharge instructions. The lesion was partially debrided and silver nitrate was applied with an apperature pad under occlusion.  The

## 2019-05-14 ENCOUNTER — CARE COORDINATION (OUTPATIENT)
Dept: CARE COORDINATION | Age: 79
End: 2019-05-14

## 2019-05-14 NOTE — CARE COORDINATION
received report from [facility], stating patient accepts their Home to stay program.

## 2019-05-15 ENCOUNTER — CARE COORDINATION (OUTPATIENT)
Dept: CASE MANAGEMENT | Age: 79
End: 2019-05-15

## 2019-05-15 NOTE — CARE COORDINATION
medications?:  No  Do you currently have any active services?:  No  Do you have any needs or concerns that I can assist you with?:  Yes (Comment: following closely to make sure appts get scheduled, but patient wants to schedule her own)  Care Transitions Interventions     Other Services:   (Comment: chemo)   Other Interventions: Follow Up  Future Appointments   Date Time Provider Valerie Recinos   7/15/2019 10:45 AM Dea Crawford DPM [facility]   7/19/2019 12:45 PM Seamus Mckeon MD [facility] None       Fawn Zimmerman RN. CTC

## 2019-05-16 ENCOUNTER — CARE COORDINATION (OUTPATIENT)
Dept: CASE MANAGEMENT | Age: 79
End: 2019-05-16

## 2019-05-17 ENCOUNTER — CARE COORDINATION (OUTPATIENT)
Dept: CARE COORDINATION | Age: 79
End: 2019-05-17

## 2019-05-17 NOTE — CARE COORDINATION
Ambulatory Care Coordination Note  5/17/2019  CM Risk Score: 15  Yohannes Mortality Risk Score: 25    ACC: Jose Roberto Paige, RN    Summary Note: Met with Karol Ferrer at her OV today. Care Coordination and RNCC's role was explained to Karol Ferrer and she is agreeable to [address] [place] [address] Enrollment. Karol Ferrer lives at home independently with her . Her medical history includes MM, cardiac amyloidosis with CHF and NICMP, DM-2, JAMES, PAD and new AF. Karol Ferrer is currently under a lot of stress because her brother has Alzheimer's and she is currently trying to place him in a LTC facility. Her family lives out of town, so she is the only one who can help him. She is getting help from his [facility], [place] [facility] [place] and from the [address] [place] [address]. Karol Ferrer was recently discharged from the hospital S/P NSTEMI and 2 stents to her LAD. She denies any CP currently. Karol Ferrer sees Dr. Beth Caldwell for her DM-2. Her last A1c was 9.1 in March and she sees Dr. Beth Caldwell every 3 months. She takes Novolin 70/30 and Novolog R before each meal. She said she often only eats 2 meals a day, so she only takes her Insulin 2 times a day. Offered Karol Ferrer the services of the [address] [place] [address] dietician, but she declines at this time, stating she is too busy with infusions and multiple doctor appointments, some of them in [place] at the [facility]. Karol Ferrer has no help at home besides her , but she said she can do all her ADLs and her  helps with the housework and cooking. Her only complaint at present is fatigue and stress related to her brother's Alzheimer's. CC PLAN: F/U PC in one week to discuss DM-2 and CHF management and zones.       Ambulatory Care Coordination Assessment    Care Coordination Protocol  Program Enrollment:  Complex Care  Referral from Primary Care Provider:  No  Week 1 - Initial Assessment     Do you have all of your prescriptions and are they filled?:  Yes (Comment:  picking up eliquis + gabopentin now)  Barriers to medication adherence: None  Are you able to afford your medications?:  Yes  How often do you have trouble taking your medications the way you have been told to take them?:  I always take them as prescribed. Do you have Home O2 Therapy?:  No      Ability to seek help/take action for Emergent Urgent situations i.e. fire, crime, inclement weather or health crisis. :  Independent  Ability to ambulate to restroom:  Independent  Ability handle personal hygeine needs (bathing/dressing/grooming): Independent  Ability to manage Medications: Independent  Ability to prepare Food Preparation:  Needs Assistance  Ability to maintain home (clean home, laundry):  Needs Assistance  Ability to drive and/or has transportation:  Dependent  Ability to do shopping:  Needs Assistance  Ability to manage finances: Independent  Is patient able to live independently?:  Yes     Current Housing:  Private Residence        Per the Fall Risk Screening, did the patient have 2 or more falls or 1 fall with injury in the past year?:  No        Are you experiencing loss of meaning?:  No  Are you experiencing loss of hope and peace?:  No     Thinking about your patient's physical health needs, are there any symptoms or problems (risk indicators) you are unsure about that require further investigation?:  No identified areas of uncertainly or problems already being investigated   Are the patients physical health problems impacting on their mental well-being?:  Mild impact on mental well-being e.g. \"\"feeling fed-up\"\", \"\"reduced enjoyment\"\"   Are there any problems with your patients lifestyle behaviors (alcohol, drugs, diet, exercise) that are impacting on physical or mental well-being?:  Some mild concern of potential negative impact on well-being   Do you have any other concerns about your patients mental well-being?  How would you rate their severity and impact on the patient?:  Mild problems - don't interfere with function   How do daily activities impact on the patient's well-being? (include current or anticipated unemployment, work, caregiving, access to transportation or other):  Some general dissatisfaction but no concern   How would you rate their social network (family, work, friends)?:  Adequate participation with social networks   How would you rate their financial resources (including ability to afford all required medical care)?:  Financially secure, some resource challenges   How wells does the patient now understand their health and well-being (symptoms, signs or risk factors) and what they need to do to manage their health?:  Reasonable to good understanding and already engages in managing health or is willing to undertake better management   How well do you think your patient can engage in healthcare discussions? (Barriers include language, deafness, aphasia, alcohol or drug problems, learning difficulties, concentration):  Clear and open communication, no identified barriers   Do other services need to be involved to help this patient?:  Other care/services in place and adequate   Suggested Interventions and Community Resources  Diabetes Education:  Declined    Medi Set or Pill Pack:  Declined   Registered Dietician:  Declined   Zone Management Tools:  Completed         Set up/Review an Education Plan, Set up/Review Goals              Prior to Admission medications    Medication Sig Start Date End Date Taking? Authorizing Provider   rosuvastatin (CRESTOR) 20 MG tablet Take 20 mg by mouth daily    Historical Provider, MD   carvedilol (COREG) 6.25 MG tablet Take 1 tablet by mouth 2 times daily 5/17/19   PHILIP Hayes - CNP   nystatin (MYCOSTATIN) 478827 UNIT/GM powder Apply topically    Historical Provider, MD   apixaban (ELIQUIS) 2.5 MG TABS tablet Take 1 tablet by mouth 2 times daily 5/11/19   Livier Mays MD   gabapentin (NEURONTIN) 100 MG capsule Take 1 capsule by mouth 3 times daily for 30 days.  5/11/19 6/10/19  Livier Mays MD   torsemide (DEMADEX) 20 MG tablet Take 40 mg by mouth three times a week Indications: Monday, Wednesday, Friday; 20 mg all other days    Historical Provider, MD   torsemide (DEMADEX) 20 MG tablet Take 20 mg by mouth four times a week Indications: Sunday, Tuesday, Thursday, Saturday; 40 mg all other days    Historical Provider, MD   levothyroxine (SYNTHROID) 75 MCG tablet Take 75 mcg by mouth Daily    Historical Provider, MD   insulin 70-30 (NOVOLIN 70/30) (70-30) 100 UNIT per ML injection vial Inject 45 Units into the skin daily (with breakfast)    Historical Provider, MD   vitamin D (ERGOCALCIFEROL) 57810 units CAPS capsule TAKE 1 CAPSULE BY MOUTH ONE TIME A WEEK 3/28/19   Martha Urbina MD   magnesium oxide (MAG-OX) 400 (241.3 Mg) MG TABS tablet Take 1 tablet by mouth 2 times daily for 15 days 3/19/19 5/9/19  Martha Urbina MD   pyridoxine (B-6) 50 MG tablet Take 50 mg by mouth 12/10/18   Historical Provider, MD   pantoprazole (PROTONIX) 20 MG tablet Take 1 tablet by mouth daily 11/2/18   PHILIP Colvin CNP   ondansetron (ZOFRAN ODT) 4 MG disintegrating tablet Take 1 tablet by mouth every 8 hours as needed for Nausea 10/27/18   Yesenia Miner MD   allopurinol (ZYLOPRIM) 300 MG tablet TAKE 1 TABLET BY MOUTH ONE TIME A DAY  9/4/18   PHILIP Colvin CNP   bortezomib (VELCADE) 3.5 MG chemo innjection Sig: once a week injection  Patient taking differently: Sig: every other week injection 6/5/18   PHILIP Colvin CNP   insulin aspart (NOVOLOG) 100 UNIT/ML injection vial Inject 8 Units into the skin 3 times daily (before meals)     Historical Provider, MD   b complex vitamins capsule Take 1 capsule by mouth daily OTC    Historical Provider, MD   acyclovir (ZOVIRAX) 200 MG capsule Take 200 mg by mouth 2 times daily  7/20/17   Historical Provider, MD   albuterol sulfate HFA (PROVENTIL HFA) 108 (90 BASE) MCG/ACT inhaler Inhale 2 puffs into the lungs every 6 hours as needed for Wheezing 3/31/17 6/15/19 Magy Avalosgrim APRN - CNP   fluticasone-salmeterol (ADVAIR DISKUS) 100-50 MCG/DOSE diskus inhaler INHALE ONE PUFF BY MOUTH EVERY 12 HOURS 3/9/17   Magy Rausch APRN - CNP   clopidogrel (PLAVIX) 75 MG tablet Take 75 mg by mouth daily    Historical Provider, MD   nitroGLYCERIN (NITROSTAT) 0.4 MG SL tablet Place 1 tablet under the tongue every 5 minutes as needed for Chest pain 7/21/15   PHILIP Dial - CNP   Biotin 5000 MCG TABS Take  by mouth daily. Historical Provider, MD   aspirin 81 MG EC tablet Take 81 mg by mouth daily. Historical Provider, MD       Future Appointments   Date Time Provider Valerie Cochrani   6/28/2019 11:15 AM PHILIP Dial - CNP [place] [facility]   7/15/2019 10:45 AM Cristy Alex DPM [facility]   7/19/2019 12:45 PM Kevyn Augustine MD [facility] None     ,   Diabetes Assessment    Medic Alert ID:  No  Meal Planning:  None   How often do you test your blood sugar?:  Meals (Comment: 2-3 times day)   Do you have barriers with adherence to non-pharmacologic self-management interventions?  (Nutrition/Exercise/Self-Monitoring):  No   Have you ever had to go to the ED for symptoms of low blood sugar?:  No       Blood Sugars less than 70   Do you have hyperglycemia symptoms?:  No   Do you have hypoglycemia symptoms?:  Yes   Frequency of Episodes:  2 Per:  Week   Blood Sugar Monitoring Regimen:  Before Meals   Blood Sugar Trends:  No Change      ,   Congestive Heart Failure Assessment    Are you currently restricting fluids?:  No Restriction  Do you salt your food before tasting it?:  No     No patient-reported symptoms      Symptoms:   CHF associated fatigue: Pos, CHF associated weakness: Pos      Symptom course:  stable  Weight trend:  stable      and   General Assessment    Do you have any symptoms that are causing concern?:  Yes  Progression since Onset:  Unchanged  Reported Symptoms:  Fatigue

## 2019-05-21 NOTE — CARE COORDINATION
[facility] Transitions Follow Up Call  2019    Patient: Kyleigh Nichols  Patient : 1940   MRN: [id number]  Reason for Admission: NSTEMI, LAD stents, AF  Discharge Date: 19 RARS: 34     Spoke with: patient who complains of some cath site - right groin \"soreness. \"  Ecchymosis, but reports \"not worse than before. \"  Plans to see cardiologist tomorrow for hospital f/u,  & will have groin examined again then. Confirms that she is off ASA now & is continuing Plavix & Eliquis for stents & new AF. She is double checking with cardiology about ASA tomorrow, but tells me that Dr Fischer Friday told her to stop it after her discharge from [facility]. She typically follows with Dr Ki Klein @  [facility], but has been seen by Dr Fischer Friday & Dr Elisabet Fabian while in hospital.  Will plan to update medication list with any new cardiology changes after her appt before signing off to [facility] for further f/u. Care Transitions Subsequent and Final Call    Schedule Follow Up Appointment with PCP:  Completed  Subsequent and Final Calls  Do you have any ongoing symptoms?:  Yes  Onset of Patient-reported symptoms: In the past 7 days  Patient-reported symptoms:  Other  Interventions for patient-reported symptoms:  Other  Have your medications changed?:  No  Do you have any questions related to your medications?:  No  Do you currently have any active services?:  No  Do you have any needs or concerns that I can assist you with?:  No  Care Transitions Interventions     Other Services:   (Comment: chemo)   Other Interventions:             Follow Up  Future Appointments   Date Time Provider Valerie Recinos   2019 11:15 AM PHILIP Michele - CNP [place] [facility]   7/15/2019 10:45 AM Jessie David DPM [facility]   2019 12:45 PM Radha Villasenor MD [facility] None       Beth Dimas RN
[facility] Transitions Follow Up Call  Attempted to reach patient for subsequent transitional call to both contact numbers.  left to return call to [facility]. Noted earlier appt today.     2019    Patient: Aster Pastor  Patient : 1940   MRN: [id number]  Reason for Admission: recent LAD stent, AF  Discharge Date: 19 RARS: Readmission Risk Score: 29        Follow Up  Future Appointments   Date Time Provider Valerie Recinos   2019 11:15 AM PHILIP Del Valle - CNP [place] [facility]   7/15/2019 10:45 AM Rony Lincoln DPM [facility]   2019 12:45 PM Mirlande Gutierrez MD [facility] None       Diana Johnson, RN
[facility] 45 Transitions Follow Up Call - call attempts to both contact numbers Attempted to reach patient for subsequent transitional call. VM left to return call to [facility].   Left message reminder on both contact numbers about appt scheduled for tomorrow,  - hospital f/u  2019    Patient: Asia Montague  Patient : 1940   MRN: [id number]  Reason for Admission: NSTEMI, LAD stent  Discharge Date: 19 RARS: Readmission Risk Score: 29      Follow Up  Future Appointments   Date Time Provider Valerie Recinos   2019 11:00 AM PHILIP Ferro - CNP [place] [facility]   7/15/2019 10:45 AM Dearl KARLA Hernandez [facility]   2019 12:45 PM Kaylee Mckee MD [facility] None       Leona Pollock RN
(3) no apparent problem

## 2019-05-23 ENCOUNTER — CARE COORDINATION (OUTPATIENT)
Dept: CASE MANAGEMENT | Age: 79
End: 2019-05-23

## 2019-05-23 NOTE — CARE COORDINATION
[facility] 45 Transitions Follow Up Call  2019    Patient: Aster Pastor  Patient : 1940   MRN: [id number]  Reason for Admission: chest pain, LAD stent, NSTEMI  Discharge Date: 19 RARS: Readmission Risk Score: 34    Spoke with: patient who tells me that she is going to her cardiologist today - plans to ask cardiologist about ASA, imdur. She will inform CTC of how cardiology appt goes & if any changes. Plan is to obtain this information, then [facility] once transitional episode completed. Care Transitions Subsequent and Final Call    Schedule Follow Up Appointment with PCP:  Completed  Subsequent and Final Calls  Do you have any ongoing symptoms?:  No  Have your medications changed?:  No  Do you have any questions related to your medications?:  No  Do you currently have any active services?:  No  Do you have any needs or concerns that I can assist you with?:  No  Care Transitions Interventions     Other Services:   (Comment: chemo)   Other Interventions:             Follow Up  Future Appointments   Date Time Provider Valerie Recinos   2019 11:15 AM PHILIP Del Valle - CNP [place] [facility]   7/15/2019 10:45 AM Rony Lincoln DPM [facility]   2019 12:45 PM Mirlande Gutierrez MD [facility] None       Diana Johnson, RN

## 2019-05-24 ENCOUNTER — CARE COORDINATION (OUTPATIENT)
Dept: CASE MANAGEMENT | Age: 79
End: 2019-05-24

## 2019-05-24 NOTE — CARE COORDINATION
Christine 45 Transitions Final Call    2019    Patient: Oziel Bautista  Patient : 1940   MRN: [id number]  Reason for Admission: NSTEMI, LAD stent, AF  Discharge Date: 19 RARS: Readmission Risk Score: 34    Spoke with: patient who was seen by her cardiologist - Dr. Megan Ahmadi yesterday. Informs me that her right groin area, cath site is painful especially when she walks. She was sent for an ultrasound yesterday & was told that it was OK, negative. Instructed by cardiologist to use moist compresses - warm or cold for symptomatic relief. She still plans to check with him next week about another appt & will notify cardio office if groin area pain does not improve. She checked with cardiology office today to find out when she should be seen again for next appt & was told that physician needs to review scan to determine next follow-up. Groin continues to be ecchymotic, but soft & tender - continues plavix for LAD stent + eliquis for new AF. Patient is aware that her Hgb was 9.8 on day of discharge from [facility]   & that she has another active lab order in for CBC. She plans to have CBC drawn within a few days. Care Transition episode resolved. Encounter routed to [facility], Joannashelley Varela.       Care Transitions Subsequent and Final Call    Schedule Follow Up Appointment with PCP:  Completed  Subsequent and Final Calls  Do you have any ongoing symptoms?:  Yes  Onset of Patient-reported symptoms:  Other  Patient-reported symptoms:  Pain  Interventions for patient-reported symptoms:  Notified PCP/Physician  Do you currently have any active services?:  No  Do you have any needs or concerns that I can assist you with?:  Yes (Comment: groin pain)  Care Transitions Interventions     Other Services:   (Comment: chemo)   Other Interventions:          Care Transition Summary: NSTEMI, LAD stent x 2 (plavix), New AF (eliquis) Discharged from [facility]     Patient: Oziel Bautista Patient : 1940

## 2019-05-30 ENCOUNTER — CARE COORDINATION (OUTPATIENT)
Dept: CARE COORDINATION | Age: 79
End: 2019-05-30

## 2019-05-30 LAB
EKG ATRIAL RATE: 82 BPM
EKG Q-T INTERVAL: 376 MS
EKG QRS DURATION: 88 MS
EKG QTC CALCULATION (BAZETT): 436 MS
EKG R AXIS: 31 DEGREES
EKG T AXIS: 23 DEGREES
EKG VENTRICULAR RATE: 81 BPM

## 2019-06-10 ENCOUNTER — HOSPITAL ENCOUNTER (OUTPATIENT)
Age: 79
Setting detail: SPECIMEN
Discharge: HOME | End: 2019-06-10
Payer: MEDICARE

## 2019-06-10 ENCOUNTER — CARE COORDINATION (OUTPATIENT)
Dept: CARE COORDINATION | Age: 79
End: 2019-06-10

## 2019-06-10 DIAGNOSIS — I10 ESSENTIAL HYPERTENSION: ICD-10-CM

## 2019-06-10 DIAGNOSIS — E78.00 PURE HYPERCHOLESTEROLEMIA: ICD-10-CM

## 2019-06-10 DIAGNOSIS — E03.9 HYPOTHYROIDISM, UNSPECIFIED TYPE: ICD-10-CM

## 2019-06-10 LAB
ALBUMIN SERPL-MCNC: 3.7 G/DL (ref 3.5–5.2)
ALBUMIN/GLOBULIN RATIO: 0.9 (ref 1–2.5)
ALP BLD-CCNC: 143 U/L (ref 35–104)
ALT SERPL-CCNC: 18 U/L (ref 5–33)
ANION GAP SERPL CALCULATED.3IONS-SCNC: 13 MMOL/L (ref 9–17)
AST SERPL-CCNC: 26 U/L
BILIRUB SERPL-MCNC: 0.58 MG/DL (ref 0.3–1.2)
BUN BLDV-MCNC: 36 MG/DL (ref 8–23)
BUN/CREAT BLD: ABNORMAL (ref 9–20)
CALCIUM SERPL-MCNC: 9.6 MG/DL (ref 8.6–10.4)
CHLORIDE BLD-SCNC: 99 MMOL/L (ref 98–107)
CHOLESTEROL/HDL RATIO: 3.6
CHOLESTEROL: 177 MG/DL
CO2: 25 MMOL/L (ref 20–31)
CREAT SERPL-MCNC: 1.33 MG/DL (ref 0.5–0.9)
GFR AFRICAN AMERICAN: 47 ML/MIN
GFR NON-AFRICAN AMERICAN: 38 ML/MIN
GFR SERPL CREATININE-BSD FRML MDRD: ABNORMAL ML/MIN/{1.73_M2}
GFR SERPL CREATININE-BSD FRML MDRD: ABNORMAL ML/MIN/{1.73_M2}
GLUCOSE BLD-MCNC: 101 MG/DL (ref 70–99)
HCT VFR BLD CALC: 35.4 % (ref 36.3–47.1)
HDLC SERPL-MCNC: 49 MG/DL
HEMOGLOBIN: 11 G/DL (ref 11.9–15.1)
LDL CHOLESTEROL: 99 MG/DL (ref 0–130)
MCH RBC QN AUTO: 31.3 PG (ref 25.2–33.5)
MCHC RBC AUTO-ENTMCNC: 31.1 G/DL (ref 28.4–34.8)
MCV RBC AUTO: 100.6 FL (ref 82.6–102.9)
NRBC AUTOMATED: 0 PER 100 WBC
PDW BLD-RTO: 15.9 % (ref 11.8–14.4)
PLATELET # BLD: 205 K/UL (ref 138–453)
PMV BLD AUTO: 10.2 FL (ref 8.1–13.5)
POTASSIUM SERPL-SCNC: 4.7 MMOL/L (ref 3.7–5.3)
RBC # BLD: 3.52 M/UL (ref 3.95–5.11)
SODIUM BLD-SCNC: 137 MMOL/L (ref 135–144)
THYROXINE, FREE: 1.35 NG/DL (ref 0.93–1.7)
TOTAL PROTEIN: 7.9 G/DL (ref 6.4–8.3)
TRIGL SERPL-MCNC: 145 MG/DL
TSH SERPL DL<=0.05 MIU/L-ACNC: 0.9 MIU/L (ref 0.3–5)
VLDLC SERPL CALC-MCNC: NORMAL MG/DL (ref 1–30)
WBC # BLD: 5.6 K/UL (ref 3.5–11.3)

## 2019-06-17 ENCOUNTER — CARE COORDINATION (OUTPATIENT)
Dept: CARE COORDINATION | Age: 79
End: 2019-06-17

## 2019-06-17 NOTE — CARE COORDINATION
directed - Daily and write down weights  I will notify my provider of any increase in weight by 3 or more pounds in 2 days OR 5 or more pounds in a week. Blood Pressure - I will take my blood pressure as directed - Daily  I will notify my provider of any trends of increasing or decreasing blood pressures over a month period of time. I will notify my provider of any changes in blood pressure associated with symptoms of dizziness, falls, passing out, headache, confusion/change in mental status. None Recently Recorded    Barriers: lack of motivation, lack of support, overwhelmed by complexity of regimen and stress  Plan for overcoming my barriers: Care Coordinator,    Confidence: 8/10  Anticipated Goal Completion Date: 8/31/2019              Prior to Admission medications    Medication Sig Start Date End Date Taking? Authorizing Provider   rosuvastatin (CRESTOR) 20 MG tablet Take 20 mg by mouth daily    Historical Provider, MD   carvedilol (COREG) 6.25 MG tablet Take 1 tablet by mouth 2 times daily 5/17/19   PHILIP Perez - CNP   nystatin (MYCOSTATIN) 991044 UNIT/GM powder Apply topically    Historical Provider, MD   apixaban (ELIQUIS) 2.5 MG TABS tablet Take 1 tablet by mouth 2 times daily 5/11/19   Norman Lara MD   gabapentin (NEURONTIN) 100 MG capsule Take 1 capsule by mouth 3 times daily for 30 days.  5/11/19 6/10/19  Norman Lara MD   torsemide (DEMADEX) 20 MG tablet Take 40 mg by mouth three times a week Indications: Monday, Wednesday, Friday; 20 mg all other days    Historical Provider, MD   torsemide (DEMADEX) 20 MG tablet Take 20 mg by mouth four times a week Indications: Sunday, Tuesday, Thursday, Saturday; 40 mg all other days    Historical Provider, MD   levothyroxine (SYNTHROID) 75 MCG tablet Take 75 mcg by mouth Daily    Historical Provider, MD   insulin 70-30 (NOVOLIN 70/30) (70-30) 100 UNIT per ML injection vial Inject 45 Units into the skin daily (with breakfast)    Historical Provider, MD   vitamin D (ERGOCALCIFEROL) 89598 units CAPS capsule TAKE 1 CAPSULE BY MOUTH ONE TIME A WEEK 3/28/19   Laney Heaton MD   magnesium oxide (MAG-OX) 400 (241.3 Mg) MG TABS tablet Take 1 tablet by mouth 2 times daily for 15 days 3/19/19 5/9/19  Laney Heaton MD   pyridoxine (B-6) 50 MG tablet Take 50 mg by mouth 12/10/18   Historical Provider, MD   pantoprazole (PROTONIX) 20 MG tablet Take 1 tablet by mouth daily 11/2/18   PHILIP Guzmán CNP   ondansetron (ZOFRAN ODT) 4 MG disintegrating tablet Take 1 tablet by mouth every 8 hours as needed for Nausea 10/27/18   Maria Teresa Cantu MD   allopurinol (ZYLOPRIM) 300 MG tablet TAKE 1 TABLET BY MOUTH ONE TIME A DAY  9/4/18   PHILIP Guzmán CNP   bortezomib (VELCADE) 3.5 MG chemo innjection Sig: once a week injection  Patient taking differently: Sig: every other week injection 6/5/18   PHILIP Guzmán CNP   insulin aspart (NOVOLOG) 100 UNIT/ML injection vial Inject 8 Units into the skin 3 times daily (before meals)     Historical Provider, MD   b complex vitamins capsule Take 1 capsule by mouth daily OTC    Historical Provider, MD   acyclovir (ZOVIRAX) 200 MG capsule Take 200 mg by mouth 2 times daily  7/20/17   Historical Provider, MD   albuterol sulfate HFA (PROVENTIL HFA) 108 (90 BASE) MCG/ACT inhaler Inhale 2 puffs into the lungs every 6 hours as needed for Wheezing 3/31/17 6/15/19  PHILIP Guzmán CNP   fluticasone-salmeterol (ADVAIR DISKUS) 100-50 MCG/DOSE diskus inhaler INHALE ONE PUFF BY MOUTH EVERY 12 HOURS 3/9/17   PHILIP Guzmán CNP   clopidogrel (PLAVIX) 75 MG tablet Take 75 mg by mouth daily    Historical Provider, MD   nitroGLYCERIN (NITROSTAT) 0.4 MG SL tablet Place 1 tablet under the tongue every 5 minutes as needed for Chest pain 7/21/15   PHILIP Guzmán CNP   Biotin 5000 MCG TABS Take  by mouth daily. Historical Provider, MD   aspirin 81 MG EC tablet Take 81 mg by mouth daily.       Historical Provider, MD       Future Appointments   Date Time Provider Valerie Cochrani   6/27/2019 11:30 AM PHILIP Simpson CNP [facility]   7/15/2019 10:45 AM Sagar Oneill DPM [facility]   7/19/2019 12:45 PM Leigh Griffin MD [facility] None     ,   Diabetes Assessment    Medic Alert ID:  No  Meal Planning:  None   How often do you test your blood sugar?:  Meals (Comment: 2-3 times day)   Do you have barriers with adherence to non-pharmacologic self-management interventions?  (Nutrition/Exercise/Self-Monitoring):  No   Have you ever had to go to the ED for symptoms of low blood sugar?:  No       Symptoms of Low Blood Sugar, Blood Sugars less than 70   Do you have hyperglycemia symptoms?:  No   Do you have hypoglycemia symptoms?:  Yes   Frequency of Episodes:  2 Per:  Month   Blood Sugar Monitoring Regimen:  Before Meals   Blood Sugar Trends:  Fluctuating      ,   Congestive Heart Failure Assessment    Are you currently restricting fluids?:  No Restriction  Do you understand a low sodium diet?:  Yes  Do you understand how to read food labels?:  Yes  How many restaurant meals do you eat per week?:  0  Do you salt your food before tasting it?:  No     No patient-reported symptoms      Symptoms:   None:  Yes      Symptom course:  stable  Weight trend:  stable  Salt intake watch compared to last visit:  stable      and   General Assessment    Do you have any symptoms that are causing concern?:  Yes  Progression since Onset:  Unchanged  Reported Symptoms:  Fatigue

## 2019-07-01 ENCOUNTER — CARE COORDINATION (OUTPATIENT)
Dept: CARE COORDINATION | Age: 79
End: 2019-07-01

## 2019-07-01 NOTE — CARE COORDINATION
lack of support, overwhelmed by complexity of regimen and stress  Plan for overcoming my barriers: Care Coordinator,    Confidence: 8/10  Anticipated Goal Completion Date: 8/31/2019              Prior to Admission medications    Medication Sig Start Date End Date Taking? Authorizing Provider   torsemide (DEMADEX) 20 MG tablet TAKE 2 TABLETS BY MOUTH ON MON, WED, AND FRIDAY, AND 1 TABLET ON TUES, THURS, SAT, AND SUNDAY 6/24/19   Noemi Chambers MD   rosuvastatin (CRESTOR) 20 MG tablet Take 20 mg by mouth daily    Historical Provider, MD   carvedilol (COREG) 6.25 MG tablet Take 1 tablet by mouth 2 times daily 5/17/19   Adriano Alvares APRN - CNP   nystatin (MYCOSTATIN) 966887 UNIT/GM powder Apply topically    Historical Provider, MD   apixaban (ELIQUIS) 2.5 MG TABS tablet Take 1 tablet by mouth 2 times daily 5/11/19   Anand Askew MD   gabapentin (NEURONTIN) 100 MG capsule Take 1 capsule by mouth 3 times daily for 30 days.  5/11/19 6/10/19  Anand Askew MD   torsemide (DEMADEX) 20 MG tablet Take 40 mg by mouth three times a week Indications: Monday, Wednesday, Friday; 20 mg all other days    Historical Provider, MD   torsemide (DEMADEX) 20 MG tablet Take 20 mg by mouth four times a week Indications: Sunday, Tuesday, Thursday, Saturday; 40 mg all other days    Historical Provider, MD   levothyroxine (SYNTHROID) 75 MCG tablet Take 75 mcg by mouth Daily    Historical Provider, MD   insulin 70-30 (NOVOLIN 70/30) (70-30) 100 UNIT per ML injection vial Inject 45 Units into the skin daily (with breakfast)    Historical Provider, MD   vitamin D (ERGOCALCIFEROL) 69973 units CAPS capsule TAKE 1 CAPSULE BY MOUTH ONE TIME A WEEK 3/28/19   Noemi Chambers MD   magnesium oxide (MAG-OX) 400 (241.3 Mg) MG TABS tablet Take 1 tablet by mouth 2 times daily for 15 days 3/19/19 5/9/19  Noemi Chambers MD   pyridoxine (B-6) 50 MG tablet Take 50 mg by mouth 12/10/18   Historical Provider, MD   pantoprazole (PROTONIX) 20

## 2019-07-08 ENCOUNTER — CARE COORDINATION (OUTPATIENT)
Dept: CARE COORDINATION | Age: 79
End: 2019-07-08

## 2019-07-08 NOTE — CARE COORDINATION
Ambulatory Care Coordination Note  7/8/2019  CM Risk Score: 15  Charlson 10 Year Mortality Risk Score: 100%     ACC: Jared Coombs, RN    Summary Note: Spoke with Malachi Clemons to follow up on needing an appt with her PCP in 2 months. Appt made with her PCP for 9/10/19. Malachi Clemons states she has had 2 episodes where she has gotten dizzy, weak and \"zoned out\" and her  had help her walk to a chair to sit down. She said the episodes both lasted for about 30-60 seconds, then she was ok. She is going to call her neurologist to make an appt to be seen for this. Malachi Clemons denies any other issues at this time. CC PLAN: F/U PC in 2 weeks to check on Lia's CHF and DM. Care Coordination Interventions    Program Enrollment:  Complex Care  Referral from Primary Care Provider:  No  Suggested Interventions and Community Resources  Diabetes Education:  Declined  Medi Set or Pill Pack:  Declined  Registered Dietician:  Declined  Zone Management Tools:  Completed (Comment: CM, CHF)         Goals Addressed                 This Visit's Progress     Self Monitoring   On track     Self-Monitored Blood Glucose - I will check my blood sugar blood sugars ac  I will notify my provider of any trends of increasing or decreasing blood sugars over a 1 month period. I will notify my provider if I have any blood sugar readings less than 70 more than 2 times a month. Daily Weights - I will weight myself as directed - Daily and write down weights  I will notify my provider of any increase in weight by 3 or more pounds in 2 days OR 5 or more pounds in a week. Blood Pressure - I will take my blood pressure as directed - Daily  I will notify my provider of any trends of increasing or decreasing blood pressures over a month period of time. I will notify my provider of any changes in blood pressure associated with symptoms of dizziness, falls, passing out, headache, confusion/change in mental status.     None Recently Recorded    Barriers: lack of motivation, lack of support, overwhelmed by complexity of regimen and stress  Plan for overcoming my barriers: Care Coordinator,    Confidence: 8/10  Anticipated Goal Completion Date: 8/31/2019              Prior to Admission medications    Medication Sig Start Date End Date Taking? Authorizing Provider   torsemide (DEMADEX) 20 MG tablet TAKE 2 TABLETS BY MOUTH ON MON, WED, AND FRIDAY, AND 1 TABLET ON TUES, THURS, SAT, AND SUNDAY 6/24/19   Jeanine Chang MD   rosuvastatin (CRESTOR) 20 MG tablet Take 20 mg by mouth daily    Historical Provider, MD   carvedilol (COREG) 6.25 MG tablet Take 1 tablet by mouth 2 times daily 5/17/19   Aide Douglas, APRN - CNP   nystatin (MYCOSTATIN) 585651 UNIT/GM powder Apply topically    Historical Provider, MD   apixaban (ELIQUIS) 2.5 MG TABS tablet Take 1 tablet by mouth 2 times daily 5/11/19   Nayla Garcia MD   gabapentin (NEURONTIN) 100 MG capsule Take 1 capsule by mouth 3 times daily for 30 days.  5/11/19 6/10/19  Nayla Garcia MD   torsemide (DEMADEX) 20 MG tablet Take 40 mg by mouth three times a week Indications: Monday, Wednesday, Friday; 20 mg all other days    Historical Provider, MD   torsemide (DEMADEX) 20 MG tablet Take 20 mg by mouth four times a week Indications: Sunday, Tuesday, Thursday, Saturday; 40 mg all other days    Historical Provider, MD   levothyroxine (SYNTHROID) 75 MCG tablet Take 75 mcg by mouth Daily    Historical Provider, MD   insulin 70-30 (NOVOLIN 70/30) (70-30) 100 UNIT per ML injection vial Inject 45 Units into the skin daily (with breakfast)    Historical Provider, MD   vitamin D (ERGOCALCIFEROL) 30375 units CAPS capsule TAKE 1 CAPSULE BY MOUTH ONE TIME A WEEK 3/28/19   Jeanine Chang MD   magnesium oxide (MAG-OX) 400 (241.3 Mg) MG TABS tablet Take 1 tablet by mouth 2 times daily for 15 days 3/19/19 5/9/19  Jeanine Chang MD   pyridoxine (B-6) 50 MG tablet Take 50 mg by mouth 12/10/18   Historical Provider, MD   pantoprazole (PROTONIX) 20 MG tablet Take 1 tablet by mouth daily 11/2/18   PHILIP Sanchez CNP   ondansetron (ZOFRAN ODT) 4 MG disintegrating tablet Take 1 tablet by mouth every 8 hours as needed for Nausea 10/27/18   Rex Casillas MD   allopurinol (ZYLOPRIM) 300 MG tablet TAKE 1 TABLET BY MOUTH ONE TIME A DAY  9/4/18   PHILIP Sanchez CNP   bortezomib (VELCADE) 3.5 MG chemo innjection Sig: once a week injection  Patient taking differently: Sig: every other week injection 6/5/18   PHILIP Sanchez CNP   insulin aspart (NOVOLOG) 100 UNIT/ML injection vial Inject 8 Units into the skin 3 times daily (before meals)     Historical Provider, MD   b complex vitamins capsule Take 1 capsule by mouth daily OTC    Historical Provider, MD   acyclovir (ZOVIRAX) 200 MG capsule Take 200 mg by mouth 2 times daily  7/20/17   Historical Provider, MD   albuterol sulfate HFA (PROVENTIL HFA) 108 (90 BASE) MCG/ACT inhaler Inhale 2 puffs into the lungs every 6 hours as needed for Wheezing 3/31/17 6/15/19  PHILIP Sanchez CNP   fluticasone-salmeterol (ADVAIR DISKUS) 100-50 MCG/DOSE diskus inhaler INHALE ONE PUFF BY MOUTH EVERY 12 HOURS 3/9/17   PHILIP Sanchez CNP   clopidogrel (PLAVIX) 75 MG tablet Take 75 mg by mouth daily    Historical Provider, MD   nitroGLYCERIN (NITROSTAT) 0.4 MG SL tablet Place 1 tablet under the tongue every 5 minutes as needed for Chest pain 7/21/15   PHILIP Sanchez CNP   Biotin 5000 MCG TABS Take  by mouth daily. Historical Provider, MD   aspirin 81 MG EC tablet Take 81 mg by mouth daily.       Historical Provider, MD       Future Appointments   Date Time Provider Valerie Recinos   7/15/2019 10:45 AM [facility]   7/19/2019 12:45 PM Norris Rutherford MD [facility] None   9/10/2019  9:30 AM PHILIP Sanchez CNP [facility]     ,   Diabetes Assessment    Medic Alert ID:  No  Meal Planning:  None   How often do you test your blood sugar?:

## 2019-07-12 ENCOUNTER — HOSPITAL ENCOUNTER (OUTPATIENT)
Age: 79
Setting detail: SPECIMEN
Discharge: HOME | End: 2019-07-12
Payer: MEDICARE

## 2019-07-12 DIAGNOSIS — N18.30 ANEMIA IN STAGE 3 CHRONIC KIDNEY DISEASE (HCC): ICD-10-CM

## 2019-07-12 DIAGNOSIS — E83.42 HYPOMAGNESEMIA: ICD-10-CM

## 2019-07-12 DIAGNOSIS — N18.30 SECONDARY DIABETES MELLITUS WITH STAGE 3 CHRONIC KIDNEY DISEASE (GFR 30-59) (HCC): ICD-10-CM

## 2019-07-12 DIAGNOSIS — D63.1 ANEMIA IN STAGE 3 CHRONIC KIDNEY DISEASE (HCC): ICD-10-CM

## 2019-07-12 DIAGNOSIS — I12.9 BENIGN HYPERTENSION WITH CKD (CHRONIC KIDNEY DISEASE) STAGE III (HCC): ICD-10-CM

## 2019-07-12 DIAGNOSIS — N18.30 BENIGN HYPERTENSION WITH CKD (CHRONIC KIDNEY DISEASE) STAGE III (HCC): ICD-10-CM

## 2019-07-12 DIAGNOSIS — E13.22 SECONDARY DIABETES MELLITUS WITH STAGE 3 CHRONIC KIDNEY DISEASE (GFR 30-59) (HCC): ICD-10-CM

## 2019-07-12 DIAGNOSIS — N18.30 CKD (CHRONIC KIDNEY DISEASE) STAGE 3, GFR 30-59 ML/MIN (HCC): ICD-10-CM

## 2019-07-12 LAB
ABSOLUTE EOS #: 0.15 K/UL (ref 0–0.44)
ABSOLUTE IMMATURE GRANULOCYTE: <0.03 K/UL (ref 0–0.3)
ABSOLUTE LYMPH #: 1.26 K/UL (ref 1.1–3.7)
ABSOLUTE MONO #: 0.44 K/UL (ref 0.1–1.2)
ANION GAP SERPL CALCULATED.3IONS-SCNC: 12 MMOL/L (ref 9–17)
BASOPHILS # BLD: 0 % (ref 0–2)
BASOPHILS ABSOLUTE: <0.03 K/UL (ref 0–0.2)
BUN BLDV-MCNC: 40 MG/DL (ref 8–23)
CALCIUM SERPL-MCNC: 10.1 MG/DL (ref 8.6–10.4)
CHLORIDE BLD-SCNC: 96 MMOL/L (ref 98–107)
CO2: 29 MMOL/L (ref 20–31)
CREAT SERPL-MCNC: 1.34 MG/DL (ref 0.5–0.9)
DIFFERENTIAL TYPE: ABNORMAL
EOSINOPHILS RELATIVE PERCENT: 2 % (ref 1–4)
GFR AFRICAN AMERICAN: 46 ML/MIN
GFR NON-AFRICAN AMERICAN: 38 ML/MIN
GFR SERPL CREATININE-BSD FRML MDRD: ABNORMAL ML/MIN/{1.73_M2}
GFR SERPL CREATININE-BSD FRML MDRD: ABNORMAL ML/MIN/{1.73_M2}
HCT VFR BLD CALC: 35.4 % (ref 36.3–47.1)
HEMOGLOBIN: 11.3 G/DL (ref 11.9–15.1)
IMMATURE GRANULOCYTES: 0 %
LYMPHOCYTES # BLD: 20 % (ref 24–43)
MAGNESIUM: 2.1 MG/DL (ref 1.6–2.6)
MCH RBC QN AUTO: 31.7 PG (ref 25.2–33.5)
MCHC RBC AUTO-ENTMCNC: 31.9 G/DL (ref 28.4–34.8)
MCV RBC AUTO: 99.2 FL (ref 82.6–102.9)
MONOCYTES # BLD: 7 % (ref 3–12)
NRBC AUTOMATED: 0 PER 100 WBC
PDW BLD-RTO: 14.5 % (ref 11.8–14.4)
PHOSPHORUS: 2.9 MG/DL (ref 2.6–4.5)
PLATELET # BLD: 210 K/UL (ref 138–453)
PLATELET ESTIMATE: ABNORMAL
PMV BLD AUTO: 10.9 FL (ref 8.1–13.5)
POTASSIUM SERPL-SCNC: 4.4 MMOL/L (ref 3.7–5.3)
RBC # BLD: 3.57 M/UL (ref 3.95–5.11)
RBC # BLD: ABNORMAL 10*6/UL
SEG NEUTROPHILS: 71 % (ref 36–65)
SEGMENTED NEUTROPHILS ABSOLUTE COUNT: 4.49 K/UL (ref 1.5–8.1)
SODIUM BLD-SCNC: 137 MMOL/L (ref 135–144)
WBC # BLD: 6.4 K/UL (ref 3.5–11.3)
WBC # BLD: ABNORMAL 10*3/UL

## 2019-07-15 ENCOUNTER — OFFICE VISIT (OUTPATIENT)
Dept: PODIATRY | Age: 79
End: 2019-07-15
Payer: MEDICARE

## 2019-07-15 VITALS — HEIGHT: 61 IN | WEIGHT: 205 LBS | BODY MASS INDEX: 38.71 KG/M2

## 2019-07-15 DIAGNOSIS — D23.71 BENIGN NEOPLASM OF SKIN OF LOWER LIMB, INCLUDING HIP, RIGHT: ICD-10-CM

## 2019-07-15 DIAGNOSIS — D23.72 BENIGN NEOPLASM OF SKIN OF LOWER LIMB, INCLUDING HIP, LEFT: ICD-10-CM

## 2019-07-15 DIAGNOSIS — E11.51 TYPE II DIABETES MELLITUS WITH PERIPHERAL CIRCULATORY DISORDER (HCC): Primary | ICD-10-CM

## 2019-07-15 DIAGNOSIS — B35.1 DERMATOPHYTOSIS OF NAIL: ICD-10-CM

## 2019-07-15 DIAGNOSIS — I73.9 PERIPHERAL VASCULAR DISORDER (HCC): ICD-10-CM

## 2019-07-15 PROCEDURE — 99999 PR OFFICE/OUTPT VISIT,PROCEDURE ONLY: CPT | Performed by: PODIATRIST

## 2019-07-15 PROCEDURE — 11721 DEBRIDE NAIL 6 OR MORE: CPT | Performed by: PODIATRIST

## 2019-07-15 PROCEDURE — 17110 DESTRUCTION B9 LES UP TO 14: CPT | Performed by: PODIATRIST

## 2019-07-15 NOTE — PROGRESS NOTES
Other reaction(s): Intolerance  Seizures felt to be related    Fentanyl Nausea And Vomiting    Percocet [Oxycodone-Acetaminophen] Nausea And Vomiting    Velcade [Bortezomib] Diarrhea and Rash     Current Outpatient Medications on File Prior to Visit   Medication Sig Dispense Refill    torsemide (DEMADEX) 20 MG tablet TAKE 2 TABLETS BY MOUTH ON MON, WED, AND FRIDAY, AND 1 TABLET ON TUES, THURS, SAT, AND SUNDAY 45 tablet 2    rosuvastatin (CRESTOR) 20 MG tablet Take 20 mg by mouth daily      carvedilol (COREG) 6.25 MG tablet Take 1 tablet by mouth 2 times daily 60 tablet 0    nystatin (MYCOSTATIN) 516565 UNIT/GM powder Apply topically      apixaban (ELIQUIS) 2.5 MG TABS tablet Take 1 tablet by mouth 2 times daily 60 tablet 2    gabapentin (NEURONTIN) 100 MG capsule Take 1 capsule by mouth 3 times daily for 30 days.  90 capsule 3    torsemide (DEMADEX) 20 MG tablet Take 40 mg by mouth three times a week Indications: Monday, Wednesday, Friday; 20 mg all other days      torsemide (DEMADEX) 20 MG tablet Take 20 mg by mouth four times a week Indications: Sunday, Tuesday, Thursday, Saturday; 40 mg all other days      levothyroxine (SYNTHROID) 75 MCG tablet Take 75 mcg by mouth Daily      insulin 70-30 (NOVOLIN 70/30) (70-30) 100 UNIT per ML injection vial Inject 45 Units into the skin daily (with breakfast)      vitamin D (ERGOCALCIFEROL) 47364 units CAPS capsule TAKE 1 CAPSULE BY MOUTH ONE TIME A WEEK 16 capsule 1    magnesium oxide (MAG-OX) 400 (241.3 Mg) MG TABS tablet Take 1 tablet by mouth 2 times daily for 15 days 30 tablet 0    pyridoxine (B-6) 50 MG tablet Take 50 mg by mouth      pantoprazole (PROTONIX) 20 MG tablet Take 1 tablet by mouth daily 30 tablet 11    ondansetron (ZOFRAN ODT) 4 MG disintegrating tablet Take 1 tablet by mouth every 8 hours as needed for Nausea 20 tablet 0    allopurinol (ZYLOPRIM) 300 MG tablet TAKE 1 TABLET BY MOUTH ONE TIME A DAY  30 tablet 10    bortezomib (VELCADE)

## 2019-07-23 ENCOUNTER — CARE COORDINATION (OUTPATIENT)
Dept: CARE COORDINATION | Age: 79
End: 2019-07-23

## 2019-07-30 ENCOUNTER — CARE COORDINATION (OUTPATIENT)
Dept: CARE COORDINATION | Age: 79
End: 2019-07-30

## 2019-07-30 NOTE — CARE COORDINATION
Ambulatory Care Coordination Note  7/30/2019  CM Risk Score: 15  Charlson 10 Year Mortality Risk Score: 100%     ACC: Rosa Davis RN    Summary Note: Spoke with Bar Rajan for CM F/U of DM and CHF. Bar Rajan just went to [facility] 7/26 and got a good report. She had been having back and knee pain so she got a bone scan and there were no lytic lesions. She will continue on Velcade injections for MM. Bar Rajan reports that her blood sugars continue to fluctuate from the low 100s to over 300. This morning it was 111. Bar Rajan just wants to keep it higher so she doesn't have to call the [facility] for low BS. She said she has an appointment coming up with endocrine to check her A1c. Bar Rajan denies any CP, edema or SOB. She just came from her cardiologist's office and is going to restart Cardiac Rehab and get some Aqua Therapy, which she is excited about. She has lost a little weight recently and is hoping to lose more by exercising. CM PLAN: F/U PC in 2-3 weeks to check on DM and CHF. Care Coordination Interventions    Program Enrollment:  Complex Care  Referral from Primary Care Provider:  No  Suggested Interventions and Community Resources  Cardiac Rehab:  Completed (Comment: to start week of 8/5/19)  Diabetes Education:  Gloria Ramirez Set or Pill Pack:  Declined  Registered Dietician:  Declined  Zone Management Tools:  Completed (Comment: CM, CHF)         Goals Addressed                 This Visit's Progress     Self Monitoring   On track     Self-Monitored Blood Glucose - I will check my blood sugar blood sugars ac  I will notify my provider of any trends of increasing or decreasing blood sugars over a 1 month period. I will notify my provider if I have any blood sugar readings less than 70 more than 2 times a month.   Daily Weights - I will weight myself as directed - Daily and write down weights  I will notify my provider of any increase in weight by 3 or more pounds in 2 days OR 5 or more pounds in a

## 2019-08-07 ENCOUNTER — HOSPITAL ENCOUNTER (OUTPATIENT)
Age: 79
Discharge: HOME | DRG: 683 | End: 2019-08-07
Payer: MEDICARE

## 2019-08-07 ENCOUNTER — APPOINTMENT (OUTPATIENT)
Dept: GENERAL RADIOLOGY | Age: 79
DRG: 683 | End: 2019-08-07
Payer: MEDICARE

## 2019-08-07 ENCOUNTER — HOSPITAL ENCOUNTER (INPATIENT)
Age: 79
LOS: 1 days | Discharge: HOME | DRG: 683 | End: 2019-08-09
Attending: EMERGENCY MEDICINE | Admitting: FAMILY MEDICINE
Payer: MEDICARE

## 2019-08-07 DIAGNOSIS — E87.5 HYPERKALEMIA: Primary | ICD-10-CM

## 2019-08-07 DIAGNOSIS — R73.9 HYPERGLYCEMIA: ICD-10-CM

## 2019-08-07 DIAGNOSIS — N18.9 CHRONIC KIDNEY DISEASE, UNSPECIFIED CKD STAGE: ICD-10-CM

## 2019-08-07 LAB
ABSOLUTE EOS #: 0 K/UL (ref 0–0.4)
ABSOLUTE IMMATURE GRANULOCYTE: ABNORMAL K/UL (ref 0–0.3)
ABSOLUTE LYMPH #: 1.1 K/UL (ref 1–4.8)
ABSOLUTE MONO #: 0.4 K/UL (ref 0.1–1.3)
ALBUMIN SERPL-MCNC: 4 G/DL (ref 3.5–5.2)
ALBUMIN/GLOBULIN RATIO: ABNORMAL (ref 1–2.5)
ALP BLD-CCNC: 167 U/L (ref 35–104)
ALT SERPL-CCNC: 17 U/L (ref 5–33)
ANION GAP SERPL CALCULATED.3IONS-SCNC: 12 MMOL/L (ref 9–17)
ANION GAP SERPL CALCULATED.3IONS-SCNC: 12 MMOL/L (ref 9–17)
AST SERPL-CCNC: 23 U/L
BASOPHILS # BLD: 0 % (ref 0–2)
BASOPHILS ABSOLUTE: 0 K/UL (ref 0–0.2)
BILIRUB SERPL-MCNC: 0.36 MG/DL (ref 0.3–1.2)
BNP INTERPRETATION: ABNORMAL
BUN BLDV-MCNC: 45 MG/DL (ref 8–23)
BUN BLDV-MCNC: 46 MG/DL (ref 8–23)
BUN/CREAT BLD: ABNORMAL (ref 9–20)
BUN/CREAT BLD: ABNORMAL (ref 9–20)
CALCIUM IONIZED: 1.23 MMOL/L (ref 1.13–1.33)
CALCIUM SERPL-MCNC: 9.6 MG/DL (ref 8.6–10.4)
CALCIUM SERPL-MCNC: 9.8 MG/DL (ref 8.6–10.4)
CHLORIDE BLD-SCNC: 90 MMOL/L (ref 98–107)
CHLORIDE BLD-SCNC: 91 MMOL/L (ref 98–107)
CO2: 24 MMOL/L (ref 20–31)
CO2: 25 MMOL/L (ref 20–31)
CREAT SERPL-MCNC: 1.89 MG/DL (ref 0.5–0.9)
CREAT SERPL-MCNC: 1.94 MG/DL (ref 0.5–0.9)
DIFFERENTIAL TYPE: ABNORMAL
EOSINOPHILS RELATIVE PERCENT: 1 % (ref 0–4)
GFR AFRICAN AMERICAN: 30 ML/MIN
GFR AFRICAN AMERICAN: 31 ML/MIN
GFR NON-AFRICAN AMERICAN: 25 ML/MIN
GFR NON-AFRICAN AMERICAN: 26 ML/MIN
GFR SERPL CREATININE-BSD FRML MDRD: ABNORMAL ML/MIN/{1.73_M2}
GLUCOSE BLD-MCNC: 545 MG/DL (ref 70–99)
GLUCOSE BLD-MCNC: 573 MG/DL (ref 70–99)
HCT VFR BLD CALC: 35.2 % (ref 36–46)
HEMOGLOBIN: 11.4 G/DL (ref 12–16)
IMMATURE GRANULOCYTES: ABNORMAL %
LACTATE DEHYDROGENASE: 174 U/L (ref 135–214)
LYMPHOCYTES # BLD: 25 % (ref 24–44)
MAGNESIUM: 2.2 MG/DL (ref 1.6–2.6)
MCH RBC QN AUTO: 31.5 PG (ref 26–34)
MCHC RBC AUTO-ENTMCNC: 32.3 G/DL (ref 31–37)
MCV RBC AUTO: 97.5 FL (ref 80–100)
MONOCYTES # BLD: 9 % (ref 1–7)
NRBC AUTOMATED: ABNORMAL PER 100 WBC
PDW BLD-RTO: 15.4 % (ref 11.5–14.9)
PHOSPHORUS: 3.4 MG/DL (ref 2.6–4.5)
PLATELET # BLD: 172 K/UL (ref 150–450)
PLATELET ESTIMATE: ABNORMAL
PMV BLD AUTO: 8.4 FL (ref 6–12)
POTASSIUM SERPL-SCNC: 5.6 MMOL/L (ref 3.7–5.3)
POTASSIUM SERPL-SCNC: 6.1 MMOL/L (ref 3.7–5.3)
PRO-BNP: 1037 PG/ML
RBC # BLD: 3.61 M/UL (ref 4–5.2)
RBC # BLD: ABNORMAL 10*6/UL
SEG NEUTROPHILS: 65 % (ref 36–66)
SEGMENTED NEUTROPHILS ABSOLUTE COUNT: 2.8 K/UL (ref 1.3–9.1)
SODIUM BLD-SCNC: 127 MMOL/L (ref 135–144)
SODIUM BLD-SCNC: 127 MMOL/L (ref 135–144)
TOTAL PROTEIN: 8.6 G/DL (ref 6.4–8.3)
TROPONIN INTERP: ABNORMAL
TROPONIN INTERP: ABNORMAL
TROPONIN T: ABNORMAL NG/ML
TROPONIN T: ABNORMAL NG/ML
TROPONIN, HIGH SENSITIVITY: 21 NG/L (ref 0–14)
TROPONIN, HIGH SENSITIVITY: 22 NG/L (ref 0–14)
URIC ACID: 3.5 MG/DL (ref 2.4–5.7)
WBC # BLD: 4.3 K/UL (ref 3.5–11)
WBC # BLD: ABNORMAL 10*3/UL

## 2019-08-07 PROCEDURE — 85025 COMPLETE CBC W/AUTO DIFF WBC: CPT

## 2019-08-07 PROCEDURE — 80053 COMPREHEN METABOLIC PANEL: CPT

## 2019-08-07 PROCEDURE — 83615 LACTATE (LD) (LDH) ENZYME: CPT

## 2019-08-07 PROCEDURE — 99285 EMERGENCY DEPT VISIT HI MDM: CPT

## 2019-08-07 PROCEDURE — 2580000003 HC RX 258: Performed by: EMERGENCY MEDICINE

## 2019-08-07 PROCEDURE — 83880 ASSAY OF NATRIURETIC PEPTIDE: CPT

## 2019-08-07 PROCEDURE — 6370000000 HC RX 637 (ALT 250 FOR IP): Performed by: EMERGENCY MEDICINE

## 2019-08-07 PROCEDURE — 96374 THER/PROPH/DIAG INJ IV PUSH: CPT

## 2019-08-07 PROCEDURE — 84550 ASSAY OF BLOOD/URIC ACID: CPT

## 2019-08-07 PROCEDURE — 71046 X-RAY EXAM CHEST 2 VIEWS: CPT

## 2019-08-07 PROCEDURE — 83735 ASSAY OF MAGNESIUM: CPT

## 2019-08-07 PROCEDURE — 84484 ASSAY OF TROPONIN QUANT: CPT

## 2019-08-07 PROCEDURE — G0378 HOSPITAL OBSERVATION PER HR: HCPCS

## 2019-08-07 PROCEDURE — 82330 ASSAY OF CALCIUM: CPT

## 2019-08-07 PROCEDURE — 36415 COLL VENOUS BLD VENIPUNCTURE: CPT

## 2019-08-07 PROCEDURE — 80048 BASIC METABOLIC PNL TOTAL CA: CPT

## 2019-08-07 PROCEDURE — 84100 ASSAY OF PHOSPHORUS: CPT

## 2019-08-07 PROCEDURE — 93005 ELECTROCARDIOGRAM TRACING: CPT | Performed by: EMERGENCY MEDICINE

## 2019-08-07 PROCEDURE — 96361 HYDRATE IV INFUSION ADD-ON: CPT

## 2019-08-07 RX ORDER — 0.9 % SODIUM CHLORIDE 0.9 %
1000 INTRAVENOUS SOLUTION INTRAVENOUS ONCE
Status: COMPLETED | OUTPATIENT
Start: 2019-08-07 | End: 2019-08-07

## 2019-08-07 RX ORDER — CARVEDILOL 3.12 MG/1
3.12 TABLET ORAL 2 TIMES DAILY
COMMUNITY

## 2019-08-07 RX ORDER — DEXTROSE MONOHYDRATE 50 MG/ML
100 INJECTION, SOLUTION INTRAVENOUS PRN
Status: DISCONTINUED | OUTPATIENT
Start: 2019-08-07 | End: 2019-08-09 | Stop reason: HOSPADM

## 2019-08-07 RX ORDER — DEXTROSE MONOHYDRATE 25 G/50ML
12.5 INJECTION, SOLUTION INTRAVENOUS PRN
Status: DISCONTINUED | OUTPATIENT
Start: 2019-08-07 | End: 2019-08-09 | Stop reason: HOSPADM

## 2019-08-07 RX ORDER — BORTEZOMIB 3.5 MG/1
1 INJECTION, POWDER, LYOPHILIZED, FOR SOLUTION INTRAVENOUS; SUBCUTANEOUS
Status: ON HOLD | COMMUNITY
End: 2020-01-01 | Stop reason: HOSPADM

## 2019-08-07 RX ORDER — NICOTINE POLACRILEX 4 MG
15 LOZENGE BUCCAL PRN
Status: DISCONTINUED | OUTPATIENT
Start: 2019-08-07 | End: 2019-08-09 | Stop reason: HOSPADM

## 2019-08-07 RX ORDER — VIT A/VIT C/VIT E/ZINC/COPPER 4296-226
1 CAPSULE ORAL NIGHTLY
COMMUNITY

## 2019-08-07 RX ADMIN — INSULIN HUMAN 10 UNITS: 100 INJECTION, SOLUTION PARENTERAL at 20:12

## 2019-08-07 RX ADMIN — SODIUM CHLORIDE 1000 ML: 9 INJECTION, SOLUTION INTRAVENOUS at 19:45

## 2019-08-07 ASSESSMENT — ENCOUNTER SYMPTOMS
CONSTIPATION: 0
BACK PAIN: 0
NAUSEA: 0
BLOOD IN STOOL: 0
ABDOMINAL PAIN: 0
COLOR CHANGE: 0
SORE THROAT: 0
TROUBLE SWALLOWING: 0
COUGH: 0
VOMITING: 0
SHORTNESS OF BREATH: 1
DIARRHEA: 0

## 2019-08-07 ASSESSMENT — PAIN DESCRIPTION - PAIN TYPE: TYPE: ACUTE PAIN

## 2019-08-07 ASSESSMENT — PAIN SCALES - GENERAL: PAINLEVEL_OUTOF10: 5

## 2019-08-07 ASSESSMENT — PAIN DESCRIPTION - LOCATION: LOCATION: HEAD

## 2019-08-07 NOTE — ED PROVIDER NOTES
and are negative. Negativein 10 essential Systems except as mentioned above and in the HPI. 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 of    Multiple myeloma ([facility] [address]     OA (osteoarthritis) 9/14/2011    Osteoarthritis     Osteopenia     Pneumonia     Post-menopausal     vaginal atrophy    Pulmonary nodules     Raynaud's disease     S/P cardiac cath     x4    Sleep apnea     no machine at night.  Thyroid disease     Hypothyroid    Type 2 diabetes mellitus 9/14/2011    Type II or unspecified type diabetes mellitus without mention of complication, not stated as uncontrolled          SURGICAL HISTORY      has a past surgical history that includes Knee arthroscopy (Right, 1993); Appendectomy (07/16/12); Colonoscopy (5/5/09); Upper gastrointestinal endoscopy (5/5/09); Cholecystectomy (4/1988); Coronary angioplasty with stent (1/18/2016); Cardiac catheterization (1999, 2009, 2013, 2016); Cardiac catheterization (10/22/13); Cardiac catheterization (1/16/18); Appendectomy; and Umbilical hernia repair (1/820/2019).       CURRENT MEDICATIONS       Previous Medications    ACYCLOVIR (ZOVIRAX) 200 MG CAPSULE    Take 200 mg by mouth 2 times daily     ALBUTEROL SULFATE HFA (PROVENTIL HFA) 108 (90 BASE) MCG/ACT INHALER    Inhale 2 puffs into the lungs every 6 hours as needed for Wheezing    ALLOPURINOL (ZYLOPRIM) 300 MG TABLET    TAKE 1 TABLET BY MOUTH ONE TIME A DAY     APIXABAN (ELIQUIS) 2.5 MG TABS TABLET    Take 1 tablet by mouth 2 times daily    BIOTIN 5000 MCG TABS    Take 1 tablet by mouth nightly     BORTEZOMIB (VELCADE) 3.5 MG CHEMO INNJECTION Vitals:    08/07/19 1831   BP: 139/77   Pulse: 114   Resp: 20   Temp: 97.4 °F (36.3 °C)   TempSrc: Oral   SpO2: 98%   Weight: 207 lb (93.9 kg)   Height: 5' 1\" (1.549 m)     8:41 PM  Lab work concerning for worsening chronic kidney disease with mild hyperkalemia with a potassium of 5.6. Patient given 1 dose of IV insulin as her blood sugar is over 500 here. She is not acidotic. Low suspicion for DKA. Low suspicion for tumor lysis syndrome. IV fluid given this as well. Spoke with Dr. Karol Bran who accepted admission. CRITICALCARE:      CONSULTS:  IP CONSULT TO PRIMARY CARE PROVIDER      PROCEDURES:      FINAL IMPRESSION      1. Hyperkalemia    2. Hyperglycemia    3. Chronic kidney disease, unspecified CKD stage            DISPOSITION/PLAN   DISPOSITION Decision To Admit 08/07/2019 07:44:40 PM          PATIENT REFERRED TO:  No follow-up provider specified.     DISCHARGE MEDICATIONS:  New Prescriptions    No medications on file       (Please note that portions ofthis note were completed with a voice recognition program.  Efforts were made to edit the dictations but occasionally words are mis-transcribed.)    Dee Urena DO  Attending Emergency Physician          Dee Urena DO  08/07/19 2042

## 2019-08-08 LAB
-: ABNORMAL
ABSOLUTE EOS #: 0.1 K/UL (ref 0–0.4)
ABSOLUTE IMMATURE GRANULOCYTE: ABNORMAL K/UL (ref 0–0.3)
ABSOLUTE LYMPH #: 1.2 K/UL (ref 1–4.8)
ABSOLUTE MONO #: 0.5 K/UL (ref 0.1–1.3)
ALBUMIN SERPL-MCNC: 3.5 G/DL (ref 3.5–5.2)
ALBUMIN/GLOBULIN RATIO: ABNORMAL (ref 1–2.5)
ALP BLD-CCNC: 130 U/L (ref 35–104)
ALT SERPL-CCNC: 14 U/L (ref 5–33)
AMORPHOUS: ABNORMAL
ANION GAP SERPL CALCULATED.3IONS-SCNC: 10 MMOL/L (ref 9–17)
ANION GAP SERPL CALCULATED.3IONS-SCNC: 8 MMOL/L (ref 9–17)
AST SERPL-CCNC: 18 U/L
BACTERIA: ABNORMAL
BASOPHILS # BLD: 0 % (ref 0–2)
BASOPHILS ABSOLUTE: 0 K/UL (ref 0–0.2)
BILIRUB SERPL-MCNC: 0.44 MG/DL (ref 0.3–1.2)
BILIRUBIN URINE: NEGATIVE
BUN BLDV-MCNC: 33 MG/DL (ref 8–23)
BUN BLDV-MCNC: 38 MG/DL (ref 8–23)
BUN/CREAT BLD: ABNORMAL (ref 9–20)
BUN/CREAT BLD: ABNORMAL (ref 9–20)
CALCIUM SERPL-MCNC: 9.1 MG/DL (ref 8.6–10.4)
CALCIUM SERPL-MCNC: 9.3 MG/DL (ref 8.6–10.4)
CASTS UA: ABNORMAL /LPF
CHLORIDE BLD-SCNC: 100 MMOL/L (ref 98–107)
CHLORIDE BLD-SCNC: 102 MMOL/L (ref 98–107)
CO2: 24 MMOL/L (ref 20–31)
CO2: 25 MMOL/L (ref 20–31)
COLOR: YELLOW
COMMENT UA: ABNORMAL
CREAT SERPL-MCNC: 1.23 MG/DL (ref 0.5–0.9)
CREAT SERPL-MCNC: 1.51 MG/DL (ref 0.5–0.9)
CRYSTALS, UA: ABNORMAL /HPF
DIFFERENTIAL TYPE: ABNORMAL
EKG ATRIAL RATE: 227 BPM
EKG Q-T INTERVAL: 370 MS
EKG QRS DURATION: 76 MS
EKG QTC CALCULATION (BAZETT): 424 MS
EKG R AXIS: 32 DEGREES
EKG T AXIS: 31 DEGREES
EKG VENTRICULAR RATE: 79 BPM
EOSINOPHILS RELATIVE PERCENT: 2 % (ref 0–4)
EPITHELIAL CELLS UA: ABNORMAL /HPF
GFR AFRICAN AMERICAN: 40 ML/MIN
GFR AFRICAN AMERICAN: 51 ML/MIN
GFR NON-AFRICAN AMERICAN: 33 ML/MIN
GFR NON-AFRICAN AMERICAN: 42 ML/MIN
GFR SERPL CREATININE-BSD FRML MDRD: ABNORMAL ML/MIN/{1.73_M2}
GLUCOSE BLD-MCNC: 145 MG/DL (ref 65–105)
GLUCOSE BLD-MCNC: 179 MG/DL (ref 65–105)
GLUCOSE BLD-MCNC: 197 MG/DL (ref 65–105)
GLUCOSE BLD-MCNC: 202 MG/DL (ref 70–99)
GLUCOSE BLD-MCNC: 226 MG/DL (ref 65–105)
GLUCOSE BLD-MCNC: 248 MG/DL (ref 65–105)
GLUCOSE BLD-MCNC: 253 MG/DL (ref 70–99)
GLUCOSE BLD-MCNC: 330 MG/DL (ref 65–105)
GLUCOSE URINE: ABNORMAL
HCT VFR BLD CALC: 31.3 % (ref 36–46)
HEMOGLOBIN: 10.4 G/DL (ref 12–16)
IMMATURE GRANULOCYTES: ABNORMAL %
KETONES, URINE: NEGATIVE
LEUKOCYTE ESTERASE, URINE: NEGATIVE
LYMPHOCYTES # BLD: 32 % (ref 24–44)
MCH RBC QN AUTO: 32.2 PG (ref 26–34)
MCHC RBC AUTO-ENTMCNC: 33.2 G/DL (ref 31–37)
MCV RBC AUTO: 97.1 FL (ref 80–100)
MONOCYTES # BLD: 13 % (ref 1–7)
MUCUS: ABNORMAL
NITRITE, URINE: NEGATIVE
NRBC AUTOMATED: ABNORMAL PER 100 WBC
OTHER OBSERVATIONS UA: ABNORMAL
PDW BLD-RTO: 15.4 % (ref 11.5–14.9)
PH UA: 7 (ref 5–8)
PLATELET # BLD: 173 K/UL (ref 150–450)
PLATELET ESTIMATE: ABNORMAL
PMV BLD AUTO: 8.3 FL (ref 6–12)
POTASSIUM SERPL-SCNC: 4.5 MMOL/L (ref 3.7–5.3)
POTASSIUM SERPL-SCNC: 5.2 MMOL/L (ref 3.7–5.3)
PROTEIN UA: ABNORMAL
RBC # BLD: 3.23 M/UL (ref 4–5.2)
RBC # BLD: ABNORMAL 10*6/UL
RBC UA: ABNORMAL /HPF
RENAL EPITHELIAL, UA: ABNORMAL /HPF
SEG NEUTROPHILS: 53 % (ref 36–66)
SEGMENTED NEUTROPHILS ABSOLUTE COUNT: 2 K/UL (ref 1.3–9.1)
SODIUM BLD-SCNC: 134 MMOL/L (ref 135–144)
SODIUM BLD-SCNC: 135 MMOL/L (ref 135–144)
SPECIFIC GRAVITY UA: 1.01 (ref 1–1.03)
TOTAL PROTEIN: 7.2 G/DL (ref 6.4–8.3)
TRICHOMONAS: ABNORMAL
TURBIDITY: CLEAR
URINE HGB: NEGATIVE
UROBILINOGEN, URINE: NORMAL
WBC # BLD: 3.8 K/UL (ref 3.5–11)
WBC # BLD: ABNORMAL 10*3/UL
WBC UA: ABNORMAL /HPF
YEAST: ABNORMAL

## 2019-08-08 PROCEDURE — 6370000000 HC RX 637 (ALT 250 FOR IP): Performed by: FAMILY MEDICINE

## 2019-08-08 PROCEDURE — 2580000003 HC RX 258: Performed by: FAMILY MEDICINE

## 2019-08-08 PROCEDURE — 82947 ASSAY GLUCOSE BLOOD QUANT: CPT

## 2019-08-08 PROCEDURE — 1200000000 HC SEMI PRIVATE

## 2019-08-08 PROCEDURE — 85025 COMPLETE CBC W/AUTO DIFF WBC: CPT

## 2019-08-08 PROCEDURE — 80053 COMPREHEN METABOLIC PANEL: CPT

## 2019-08-08 PROCEDURE — 99223 1ST HOSP IP/OBS HIGH 75: CPT | Performed by: FAMILY MEDICINE

## 2019-08-08 PROCEDURE — 36415 COLL VENOUS BLD VENIPUNCTURE: CPT

## 2019-08-08 PROCEDURE — 93010 ELECTROCARDIOGRAM REPORT: CPT | Performed by: INTERNAL MEDICINE

## 2019-08-08 PROCEDURE — 6370000000 HC RX 637 (ALT 250 FOR IP): Performed by: INTERNAL MEDICINE

## 2019-08-08 PROCEDURE — 80048 BASIC METABOLIC PNL TOTAL CA: CPT

## 2019-08-08 PROCEDURE — 81001 URINALYSIS AUTO W/SCOPE: CPT

## 2019-08-08 RX ORDER — ONDANSETRON 2 MG/ML
4 INJECTION INTRAMUSCULAR; INTRAVENOUS EVERY 6 HOURS PRN
Status: DISCONTINUED | OUTPATIENT
Start: 2019-08-08 | End: 2019-08-09 | Stop reason: HOSPADM

## 2019-08-08 RX ORDER — SODIUM CHLORIDE 9 MG/ML
INJECTION, SOLUTION INTRAVENOUS CONTINUOUS
Status: DISCONTINUED | OUTPATIENT
Start: 2019-08-08 | End: 2019-08-09 | Stop reason: HOSPADM

## 2019-08-08 RX ORDER — BORTEZOMIB 3.5 MG/1
1 INJECTION, POWDER, LYOPHILIZED, FOR SOLUTION INTRAVENOUS; SUBCUTANEOUS
Status: DISCONTINUED | OUTPATIENT
Start: 2019-08-08 | End: 2019-08-08

## 2019-08-08 RX ORDER — SODIUM CHLORIDE 0.9 % (FLUSH) 0.9 %
10 SYRINGE (ML) INJECTION EVERY 12 HOURS SCHEDULED
Status: DISCONTINUED | OUTPATIENT
Start: 2019-08-08 | End: 2019-08-09 | Stop reason: HOSPADM

## 2019-08-08 RX ORDER — ROSUVASTATIN CALCIUM 10 MG/1
20 TABLET, COATED ORAL DAILY
Status: DISCONTINUED | OUTPATIENT
Start: 2019-08-08 | End: 2019-08-09 | Stop reason: HOSPADM

## 2019-08-08 RX ORDER — GABAPENTIN 100 MG/1
100 CAPSULE ORAL 3 TIMES DAILY
Status: DISCONTINUED | OUTPATIENT
Start: 2019-08-08 | End: 2019-08-09 | Stop reason: HOSPADM

## 2019-08-08 RX ORDER — LEVOTHYROXINE SODIUM 0.07 MG/1
75 TABLET ORAL DAILY
Status: DISCONTINUED | OUTPATIENT
Start: 2019-08-08 | End: 2019-08-09 | Stop reason: HOSPADM

## 2019-08-08 RX ORDER — TORSEMIDE 20 MG/1
40 TABLET ORAL
Status: DISCONTINUED | OUTPATIENT
Start: 2019-08-08 | End: 2019-08-08

## 2019-08-08 RX ORDER — VIT A/VIT C/VIT E/ZINC/COPPER 4296-226
1 CAPSULE ORAL NIGHTLY
Status: DISCONTINUED | OUTPATIENT
Start: 2019-08-08 | End: 2019-08-08 | Stop reason: CLARIF

## 2019-08-08 RX ORDER — ALBUTEROL SULFATE 90 UG/1
2 AEROSOL, METERED RESPIRATORY (INHALATION) EVERY 6 HOURS PRN
Status: DISCONTINUED | OUTPATIENT
Start: 2019-08-08 | End: 2019-08-09 | Stop reason: HOSPADM

## 2019-08-08 RX ORDER — SODIUM CHLORIDE 0.9 % (FLUSH) 0.9 %
10 SYRINGE (ML) INJECTION PRN
Status: DISCONTINUED | OUTPATIENT
Start: 2019-08-08 | End: 2019-08-09 | Stop reason: HOSPADM

## 2019-08-08 RX ORDER — VITS A,C,E/LUTEIN/MINERALS 300MCG-200
1 TABLET ORAL NIGHTLY
Status: DISCONTINUED | OUTPATIENT
Start: 2019-08-08 | End: 2019-08-09 | Stop reason: HOSPADM

## 2019-08-08 RX ORDER — PANTOPRAZOLE SODIUM 20 MG/1
20 TABLET, DELAYED RELEASE ORAL DAILY
Status: DISCONTINUED | OUTPATIENT
Start: 2019-08-08 | End: 2019-08-09 | Stop reason: HOSPADM

## 2019-08-08 RX ORDER — ANTIARTHRITIC COMBINATION NO.2 900 MG
1 TABLET ORAL NIGHTLY
Status: DISCONTINUED | OUTPATIENT
Start: 2019-08-08 | End: 2019-08-08 | Stop reason: CLARIF

## 2019-08-08 RX ORDER — CARVEDILOL 3.12 MG/1
3.12 TABLET ORAL 2 TIMES DAILY
Status: DISCONTINUED | OUTPATIENT
Start: 2019-08-08 | End: 2019-08-09 | Stop reason: HOSPADM

## 2019-08-08 RX ORDER — ACYCLOVIR 200 MG/1
200 CAPSULE ORAL 2 TIMES DAILY
Status: DISCONTINUED | OUTPATIENT
Start: 2019-08-08 | End: 2019-08-09 | Stop reason: HOSPADM

## 2019-08-08 RX ORDER — CLOPIDOGREL BISULFATE 75 MG/1
75 TABLET ORAL DAILY
Status: DISCONTINUED | OUTPATIENT
Start: 2019-08-08 | End: 2019-08-09 | Stop reason: HOSPADM

## 2019-08-08 RX ORDER — NITROGLYCERIN 0.4 MG/1
0.4 TABLET SUBLINGUAL EVERY 5 MIN PRN
Status: DISCONTINUED | OUTPATIENT
Start: 2019-08-08 | End: 2019-08-09 | Stop reason: HOSPADM

## 2019-08-08 RX ORDER — ONDANSETRON 4 MG/1
4 TABLET, ORALLY DISINTEGRATING ORAL EVERY 8 HOURS PRN
Status: DISCONTINUED | OUTPATIENT
Start: 2019-08-08 | End: 2019-08-09 | Stop reason: HOSPADM

## 2019-08-08 RX ORDER — TORSEMIDE 20 MG/1
20 TABLET ORAL
Status: DISCONTINUED | OUTPATIENT
Start: 2019-08-08 | End: 2019-08-08

## 2019-08-08 RX ORDER — ERGOCALCIFEROL 1.25 MG/1
50000 CAPSULE ORAL WEEKLY
Status: DISCONTINUED | OUTPATIENT
Start: 2019-08-09 | End: 2019-08-09 | Stop reason: HOSPADM

## 2019-08-08 RX ORDER — LANOLIN ALCOHOL/MO/W.PET/CERES
400 CREAM (GRAM) TOPICAL 2 TIMES DAILY
Status: DISCONTINUED | OUTPATIENT
Start: 2019-08-08 | End: 2019-08-09 | Stop reason: HOSPADM

## 2019-08-08 RX ORDER — ALLOPURINOL 300 MG/1
300 TABLET ORAL DAILY
Status: DISCONTINUED | OUTPATIENT
Start: 2019-08-08 | End: 2019-08-09 | Stop reason: HOSPADM

## 2019-08-08 RX ADMIN — ROSUVASTATIN CALCIUM 20 MG: 10 TABLET, FILM COATED ORAL at 12:57

## 2019-08-08 RX ADMIN — MOMETASONE FUROATE AND FORMOTEROL FUMARATE DIHYDRATE 2 PUFF: 200; 5 AEROSOL RESPIRATORY (INHALATION) at 12:25

## 2019-08-08 RX ADMIN — APIXABAN 2.5 MG: 2.5 TABLET, FILM COATED ORAL at 12:30

## 2019-08-08 RX ADMIN — ACYCLOVIR 200 MG: 200 CAPSULE ORAL at 12:56

## 2019-08-08 RX ADMIN — SODIUM CHLORIDE: 9 INJECTION, SOLUTION INTRAVENOUS at 17:31

## 2019-08-08 RX ADMIN — LEVOTHYROXINE SODIUM 75 MCG: 75 TABLET ORAL at 12:30

## 2019-08-08 RX ADMIN — CARVEDILOL 3.12 MG: 3.12 TABLET, FILM COATED ORAL at 12:30

## 2019-08-08 RX ADMIN — ALLOPURINOL 300 MG: 300 TABLET ORAL at 12:30

## 2019-08-08 RX ADMIN — ACYCLOVIR 200 MG: 200 CAPSULE ORAL at 22:50

## 2019-08-08 RX ADMIN — APIXABAN 2.5 MG: 2.5 TABLET, FILM COATED ORAL at 22:50

## 2019-08-08 RX ADMIN — GABAPENTIN 100 MG: 100 CAPSULE ORAL at 22:50

## 2019-08-08 RX ADMIN — INSULIN LISPRO 4 UNITS: 100 INJECTION, SOLUTION INTRAVENOUS; SUBCUTANEOUS at 00:06

## 2019-08-08 RX ADMIN — PANTOPRAZOLE SODIUM 20 MG: 20 TABLET, DELAYED RELEASE ORAL at 12:37

## 2019-08-08 RX ADMIN — INSULIN LISPRO 30 UNITS: 100 INJECTION, SUSPENSION SUBCUTANEOUS at 17:27

## 2019-08-08 RX ADMIN — CLOPIDOGREL BISULFATE 75 MG: 75 TABLET ORAL at 12:30

## 2019-08-08 RX ADMIN — GABAPENTIN 100 MG: 100 CAPSULE ORAL at 12:30

## 2019-08-08 RX ADMIN — MOMETASONE FUROATE AND FORMOTEROL FUMARATE DIHYDRATE 2 PUFF: 200; 5 AEROSOL RESPIRATORY (INHALATION) at 22:49

## 2019-08-08 RX ADMIN — Medication 400 MG: at 22:50

## 2019-08-08 RX ADMIN — INSULIN LISPRO 4 UNITS: 100 INJECTION, SOLUTION INTRAVENOUS; SUBCUTANEOUS at 17:26

## 2019-08-08 RX ADMIN — INSULIN LISPRO 2 UNITS: 100 INJECTION, SOLUTION INTRAVENOUS; SUBCUTANEOUS at 09:31

## 2019-08-08 RX ADMIN — CARVEDILOL 3.12 MG: 3.12 TABLET, FILM COATED ORAL at 22:50

## 2019-08-08 RX ADMIN — Medication 1 TABLET: at 22:50

## 2019-08-08 RX ADMIN — INSULIN LISPRO 1 UNITS: 100 INJECTION, SOLUTION INTRAVENOUS; SUBCUTANEOUS at 06:37

## 2019-08-08 RX ADMIN — INSULIN LISPRO 30 UNITS: 100 INJECTION, SUSPENSION SUBCUTANEOUS at 11:04

## 2019-08-08 RX ADMIN — SODIUM CHLORIDE: 9 INJECTION, SOLUTION INTRAVENOUS at 05:50

## 2019-08-08 RX ADMIN — PATIROMER 8.4 G: 8.4 POWDER, FOR SUSPENSION ORAL at 19:44

## 2019-08-08 RX ADMIN — Medication 400 MG: at 12:29

## 2019-08-08 ASSESSMENT — PAIN SCALES - GENERAL: PAINLEVEL_OUTOF10: 0

## 2019-08-08 NOTE — ED NOTES
Provided a hospital bed in place of the ED cot as this pt will be boarded until a med-surg bed becomes available.      Harriet Williamson RN  08/08/19 0125

## 2019-08-08 NOTE — ED NOTES
Report given to Romaine Severin RN from [facility] RN  Report method by phone   The following was reviewed with receiving RN:   Current vital signs:  /77   Pulse 114   Temp 97.4 °F (36.3 °C) (Oral)   Resp 20   Ht 5' 1\" (1.549 m)   Wt 207 lb (93.9 kg)   SpO2 98%   BMI 39.11 kg/m²                MEWS Score: 3     Any medication or safety alerts were reviewed. Any pending diagnostics and notifications were also reviewed, as well as any safety concerns or issues, abnormal labs, abnormal imaging, and abnormal assessment findings. Questions were answered.           Radah Jorgensen RN  08/08/19 7464

## 2019-08-08 NOTE — ED NOTES
[facility] for dosage= 1 unit.      Radha Jorgensen RN  08/08/19 9582

## 2019-08-08 NOTE — CONSULTS
NEPHROLOGY CONSULT       Reason for Consult: Hyperkalemia      Chief Complaint: Patient referred for abnormal labs  History Obtained From: Patient    History of Present Illness: This is a 77 y.o. female significant past medical history of CAD (s/p PTCA/stent placement at [facility] in mid January 2016),GERD,C3-4 vertebral disc disease,type 2 DM (with diabetic retinopathy and proteinuria 2.5 grams/day) and systemic hypertension. History of monoclonal gammopathy and suspicion for amyloidosis after bone marrow biopsies at the [facility] clinic. She had been on chemotherapy with Cytoxan and Revlimid and dexamethasone his last dose of Cytoxan 2018. She is on maintenance Revlimid. She presented after being referred with hyperkalemia of 6.1 drawn at the outpatient lab. She had a corresponding glucose of 573, sodium 127 and creatinine of 1.89 mg/dl. She was treated with insulin and started on IV fluids normal saline at 75 cc/h. Serum sodium improved to 4.5 this morning with improvement in her blood glucose. Denies fevers, chills, dysuria nausea vomiting or diarrhea. She receives torsemide as her home medication. Not on ACE inhibitors or Aldactone potassium supplements. No history of NSAID use. Past Medical History:        Diagnosis Date    Allergic rhinitis     CAD (coronary artery disease)     s/p stents  total x5    Cholelithiasis     Chronic cough 9/14/2011    Chronic cystitis 9/14/2011    Colitis 09/2016    GERD 9/14/2011    Gout     Guillain-[place] ([facility]     history of     Hyperlipidemia     Hypertension     Irritable bowel syndrome     Migraines     hx. of    Multiple myeloma ([facility]     OA (osteoarthritis) 9/14/2011    Osteoarthritis     Osteopenia     Pneumonia     Post-menopausal     vaginal atrophy    Pulmonary nodules     Raynaud's disease     S/P cardiac cath     x4    Sleep apnea     no machine at night.     Thyroid disease     Hypothyroid    Type 2 diabetes mellitus vial 0-6 Units Nightly   glucose (GLUTOSE) 40 % oral gel 15 g PRN   dextrose 50 % IV solution PRN   glucagon (rDNA) injection 1 mg PRN   dextrose 5 % solution PRN       Allergies:  Hepatitis b virus vaccine; Norvasc [amlodipine besylate]; Tramadol; Fentanyl; Percocet [oxycodone-acetaminophen]; and Velcade [bortezomib]    Social History:   Social History     Socioeconomic History    Marital status:      Spouse name: Edd    Number of children: 2    Years of education: 15    Highest education level: Not on file   Occupational History    Not on file   Social Needs    Financial resource strain: Not hard at all   Payteller insecurity:     Worry: Never true     Inability: Never true   mphoria needs:     Medical: No     Non-medical: No   Tobacco Use    Smoking status: Never Smoker    Smokeless tobacco: Never Used   Substance and Sexual Activity    Alcohol use: Yes     Alcohol/week: 1.0 standard drinks     Types: 1 Standard drinks or equivalent per week     Frequency: Monthly or less     Drinks per session: 1 or 2     Binge frequency: Monthly    Drug use: No    Sexual activity: Not on file   Lifestyle    Physical activity:     Days per week: 0 days     Minutes per session: 0 min    Stress:  To some extent   Relationships    Social connections:     Talks on phone: More than three times a week     Gets together: Once a week     Attends Islam service: 1 to 4 times per year     Active member of club or organization: No     Attends meetings of clubs or organizations: Never     Relationship status:     Intimate partner violence:     Fear of current or ex partner: No     Emotionally abused: Not on file     Physically abused: Not on file     Forced sexual activity: No   Other Topics Concern    Not on file   Social History Narrative    Not on file       Family History:   Family History   Problem Relation Age of Onset    Diabetes Mother     Hypertension Mother     Heart Disease Mother normal. Moist  NECK: Neck supple, non-tender without lymphadenopathy, masses or thyromegaly. JVD-neg  CARDIAC: Normal S1 and S2. No S3, S4 or murmurs. Rhythm is regular. LUNGS: Clear to auscultation and percussion without rales, rhonchi, wheezing or diminished breath sounds. ABD-Soft non distended, BS+ Non tender no organomegally  BACK: Examination of the spine reveals  no spinal deformity, without tenderness,   MUSKULOSKELETAL: Adequately aligned spine. No joint erythema or tenderness. EXTREMITIES: No edema. Peripheral pulses intact. NEURO:Alert oriented x 3 ,power 5/5 in all extremities      Labs:   CBC:  Recent Labs     08/07/19  1900 08/08/19  0704   WBC 4.3 3.8   RBC 3.61* 3.23*   HGB 11.4* 10.4*   HCT 35.2* 31.3*   MCV 97.5 97.1   MCH 31.5 32.2   MCHC 32.3 33.2   RDW 15.4* 15.4*    173   MPV 8.4 8.3      BMP: Recent Labs     08/07/19  1900 08/08/19  0704 08/08/19  1638   * 135 134*   K 5.6* 4.5 5.2   CL 90* 100 102   CO2 25 25 24   BUN 45* 38* 33*   CREATININE 1.94* 1.51* 1.23*   GLUCOSE 545* 202* 253*   CALCIUM 9.8 9.1 9.3        Phosphorus:    Recent Labs     08/07/19  1900   PHOS 3.4     Magnesium:   Recent Labs     08/07/19  1900   MG 2.2     Albumin:   Recent Labs     08/07/19  1644 08/08/19  0704   LABALBU 4.0 3.5       IRON:    Lab Results   Component Value Date    IRON 77 10/02/2015     Iron Saturation:  No components found for: PERCENTFE  TIBC:  No results found for: TIBC  FERRITIN:    Lab Results   Component Value Date    FERRITIN 96 11/28/2011     SPEP: Lab Results   Component Value Date    PROT 7.2 08/08/2019    ALBCAL 4.3 01/04/2017    ALBPCT 60 01/04/2017    LABALPH 0.2 01/04/2017    LABALPH 1.1 01/04/2017    A1PCT 3 01/04/2017    A2PCT 16 01/04/2017    LABBETA 0.8 01/04/2017    BETAPCT 11 01/04/2017    GAMGLOB 0.7 01/04/2017    GGPCT 9 01/04/2017    PATH ELECTRONICALLY SIGNED. Jesús Matthews M.D. 01/04/2017    PATH ELECTRONICALLY SIGNED.  Jesús Matthews M.D. 01/04/2017

## 2019-08-08 NOTE — FLOWSHEET NOTE
08/08/19 1119   Encounter Summary   Services provided to: Patient   Referral/Consult From: Rounding   Continue Visiting   (8/8/19)   Complexity of Encounter Low   Length of Encounter 15 minutes   Routine   Type Initial   Spiritual/Yarsanism   Type Spiritual support   Sacraments   Sacrament of Sick-Anointing Anointed  (Jonathan Alexander 8/8/19)

## 2019-08-08 NOTE — H&P
- CNP   ondansetron (ZOFRAN ODT) 4 MG disintegrating tablet Take 1 tablet by mouth every 8 hours as needed for Nausea 10/27/18  Yes Renae Cerrato MD   allopurinol (ZYLOPRIM) 300 MG tablet TAKE 1 TABLET BY MOUTH ONE TIME A DAY  9/4/18  Yes PHILIP Vidal CNP   insulin aspart (NOVOLOG) 100 UNIT/ML injection vial Inject 6 Units into the skin 2 times daily (before meals)    Yes Historical Provider, MD   acyclovir (ZOVIRAX) 200 MG capsule Take 200 mg by mouth 2 times daily  7/20/17  Yes Historical Provider, MD   albuterol sulfate HFA (PROVENTIL HFA) 108 (90 BASE) MCG/ACT inhaler Inhale 2 puffs into the lungs every 6 hours as needed for Wheezing 3/31/17 7/19/20 Yes Javonalene Ace VildPHILIP CNP   fluticasone-salmeterol (ADVAIR DISKUS) 100-50 MCG/DOSE diskus inhaler INHALE ONE PUFF BY MOUTH EVERY 12 HOURS 3/9/17  Yes PHILIP Vidal CNP   clopidogrel (PLAVIX) 75 MG tablet Take 75 mg by mouth daily   Yes Historical Provider, MD   nitroGLYCERIN (NITROSTAT) 0.4 MG SL tablet Place 1 tablet under the tongue every 5 minutes as needed for Chest pain 7/21/15  Yes PHILIP Vidal CNP   Biotin 5000 MCG TABS Take 1 tablet by mouth nightly    Yes Historical Provider, MD       Allergies:  Hepatitis b virus vaccine; Norvasc [amlodipine besylate]; Tramadol; Fentanyl; Percocet [oxycodone-acetaminophen]; and Velcade [bortezomib]    Social History:  The patient currently lives at home    TOBACCO:   reports that she has never smoked. She has never used smokeless tobacco.  ETOH:   reports that she drinks about 1.0 standard drinks of alcohol per week.     Review of Systems - General ROS: negative  Psychological ROS: negative  Ophthalmic ROS: negative  ENT ROS: negative  Endocrine ROS: positive for - hyperglycemia  Respiratory ROS: positive for - JAMES  Cardiovascular ROS: negative  Gastrointestinal ROS: negative  Musculoskeletal ROS: negative  Neurological ROS: negative      Family History:          Problem Relation Age of Onset Labs     08/07/19  1900   WBC 4.3   HGB 11.4*   HCT 35.2*         RENAL  Recent Labs     08/07/19  1644 08/07/19  1900   * 127*   K 6.1* 5.6*   CL 91* 90*   CO2 24 25   PHOS  --  3.4   BUN 46* 45*   CREATININE 1.89* 1.94*     LFT'S  Recent Labs     08/07/19  1644   AST 23   ALT 17   BILITOT 0.36   ALKPHOS 167*     COAG  No results for input(s): INR in the last 72 hours. CARDIAC ENZYMES  No results for input(s): CKTOTAL, CKMB, CKMBINDEX, TROPONINI in the last 72 hours. U/A:    Lab Results   Component Value Date    NITRITE neg 08/16/2016    COLORU Yellow 03/22/2019    WBCUA 0 TO 2 01/18/2019    RBCUA 0 TO 2 01/18/2019    MUCUS NOT REPORTED 01/18/2019    BACTERIA FEW 01/18/2019    CLARITYU Clear 03/22/2019    SPECGRAV 1.020 03/22/2019    LEUKOCYTESUR neg 03/22/2019    LEUKOCYTESUR NEGATIVE 01/18/2019    BLOODU Negative 03/22/2019    GLUCOSEU 100 mg 03/22/2019    AMORPHOUS 2+ 01/18/2019       ABG  No results found for: POI1GVZ, BEART, X1LRZDUH, PHART, THGBART, GUS8NOJ, PO2ART, RFL3BAW        Active Hospital Problems    Diagnosis Date Noted    Hyperkalemia [E87.5] 08/07/2019    Obesity, Class II, BMI 35-39.9 [E66.9] 05/09/2019    Essential hypertension [I10] 09/07/2017    Type 2 diabetes mellitus without complication, with long-term current use of insulin ([facility] [E11.9, Z79.4] 09/07/2017    Cardiac amyloidosis ([facility] [E85.4, I43] 07/20/2017    Cardiomyopathy, nonischemic ([facility] [I42.8] 05/03/2017    Multiple myeloma ([facility] [C90.00] 03/31/2017    JAMES (obstructive sleep apnea) [G47.33] 03/31/2017    Chronic diastolic CHF (congestive heart failure) ([address] [I50.32] 03/19/2017    Arteriosclerosis of arteries of extremities ([facility] [I70.209] 12/04/2013    Hypothyroidism [E03.9] 09/11/2012    Coronary artery disease involving native coronary artery of native heart without angina pectoris [I25.10] 09/14/2011         ASSESSMENT/PLAN:  Patient admitted with hyperkalemia & hyperglycemia.  Co-morbidities as

## 2019-08-08 NOTE — CARE COORDINATION
[facility]) In patient Care Transitions Interview     2019    Patient: Leslie Haro Patient : 1940   MRN: [id number]  Reason for Admission: Hyperkalemia [E87.5]  RARS: Readmission Risk Score: 29         Spoke with: Jonathan Solano with Hardik Phipps at bedside. She is from home and is independent. She is planning on returning home with no needs. Pt currently is observation and will follow if admit status changes. Explained CTC role and she was receptive to further outreach. Contact information given.       Readmission Risk  Patient Active Problem List   Diagnosis    Chronic cystitis    Coronary artery disease involving native coronary artery of native heart without angina pectoris    GERD    Chronic cough    OA (osteoarthritis)    LV dysfunction    Edema    Microalbuminuria    Hypothyroidism    Osteopenia    Mild carotid artery disease (HCC)    Chronic gout without tophus    S/P coronary artery stent placement    Chronic renal insufficiency    Pure hypercholesterolemia    Cervical pain (neck)    Paraproteinemia    Colitis    Morbid obesity due to excess calories (HCC)    Multiple myeloma (HCC)    JAMES (obstructive sleep apnea)    Type 2 diabetes mellitus without complication, with long-term current use of insulin ([address]    Essential hypertension    Fall at home    Dyspnea on exertion    Vitamin D deficiency    Anemia    Pain of left lower leg    Cardiac amyloidosis (HCC)    Cardiomyopathy, nonischemic (HCC)    Chronic diastolic CHF (congestive heart failure) (HCC)    Examination of participant in clinical trial    Organ-limited amyloidosis (HCC)    Anemia    Arteriosclerosis of arteries of extremities (HCC)    Intermittent claudication ([address]    Thrush, oral    Ambulates with cane    Chest pain    Obesity, Class II, BMI 35-39.9    New onset atrial fibrillation (HCC)    NSTEMI (non-ST elevated myocardial infarction) (HCC)    Hyperkalemia

## 2019-08-09 VITALS
DIASTOLIC BLOOD PRESSURE: 49 MMHG | OXYGEN SATURATION: 98 % | TEMPERATURE: 97.4 F | RESPIRATION RATE: 16 BRPM | HEART RATE: 67 BPM | BODY MASS INDEX: 39.08 KG/M2 | HEIGHT: 61 IN | WEIGHT: 207 LBS | SYSTOLIC BLOOD PRESSURE: 124 MMHG

## 2019-08-09 PROBLEM — N17.9 AKI (ACUTE KIDNEY INJURY) (HCC): Status: ACTIVE | Noted: 2019-08-09

## 2019-08-09 LAB
ALBUMIN SERPL-MCNC: 3.4 G/DL (ref 3.5–5.2)
ALBUMIN/GLOBULIN RATIO: ABNORMAL (ref 1–2.5)
ALP BLD-CCNC: 119 U/L (ref 35–104)
ALT SERPL-CCNC: 14 U/L (ref 5–33)
ANION GAP SERPL CALCULATED.3IONS-SCNC: 7 MMOL/L (ref 9–17)
AST SERPL-CCNC: 20 U/L
BILIRUB SERPL-MCNC: 0.47 MG/DL (ref 0.3–1.2)
BUN BLDV-MCNC: 26 MG/DL (ref 8–23)
BUN/CREAT BLD: ABNORMAL (ref 9–20)
CALCIUM SERPL-MCNC: 9.7 MG/DL (ref 8.6–10.4)
CHLORIDE BLD-SCNC: 106 MMOL/L (ref 98–107)
CO2: 24 MMOL/L (ref 20–31)
CREAT SERPL-MCNC: 1.14 MG/DL (ref 0.5–0.9)
FREE KAPPA/LAMBDA RATIO: 1.69 (ref 0.26–1.65)
GFR AFRICAN AMERICAN: 56 ML/MIN
GFR NON-AFRICAN AMERICAN: 46 ML/MIN
GFR SERPL CREATININE-BSD FRML MDRD: ABNORMAL ML/MIN/{1.73_M2}
GFR SERPL CREATININE-BSD FRML MDRD: ABNORMAL ML/MIN/{1.73_M2}
GLUCOSE BLD-MCNC: 156 MG/DL (ref 65–105)
GLUCOSE BLD-MCNC: 85 MG/DL (ref 65–105)
GLUCOSE BLD-MCNC: 90 MG/DL (ref 70–99)
KAPPA FREE LIGHT CHAINS QNT: 6.07 MG/DL (ref 0.37–1.94)
LAMBDA FREE LIGHT CHAINS QNT: 3.59 MG/DL (ref 0.57–2.63)
POTASSIUM SERPL-SCNC: 4.4 MMOL/L (ref 3.7–5.3)
SODIUM BLD-SCNC: 137 MMOL/L (ref 135–144)
TOTAL PROTEIN: 7.2 G/DL (ref 6.4–8.3)

## 2019-08-09 PROCEDURE — 36415 COLL VENOUS BLD VENIPUNCTURE: CPT

## 2019-08-09 PROCEDURE — 82947 ASSAY GLUCOSE BLOOD QUANT: CPT

## 2019-08-09 PROCEDURE — 99239 HOSP IP/OBS DSCHRG MGMT >30: CPT | Performed by: FAMILY MEDICINE

## 2019-08-09 PROCEDURE — 6370000000 HC RX 637 (ALT 250 FOR IP): Performed by: FAMILY MEDICINE

## 2019-08-09 PROCEDURE — 83883 ASSAY NEPHELOMETRY NOT SPEC: CPT

## 2019-08-09 PROCEDURE — 80053 COMPREHEN METABOLIC PANEL: CPT

## 2019-08-09 RX ADMIN — ROSUVASTATIN CALCIUM 20 MG: 10 TABLET, FILM COATED ORAL at 08:40

## 2019-08-09 RX ADMIN — ACYCLOVIR 200 MG: 200 CAPSULE ORAL at 08:40

## 2019-08-09 RX ADMIN — Medication 400 MG: at 08:40

## 2019-08-09 RX ADMIN — ERGOCALCIFEROL 50000 UNITS: 1.25 CAPSULE ORAL at 08:40

## 2019-08-09 RX ADMIN — INSULIN LISPRO 30 UNITS: 100 INJECTION, SUSPENSION SUBCUTANEOUS at 08:54

## 2019-08-09 RX ADMIN — PANTOPRAZOLE SODIUM 20 MG: 20 TABLET, DELAYED RELEASE ORAL at 08:54

## 2019-08-09 RX ADMIN — CLOPIDOGREL BISULFATE 75 MG: 75 TABLET ORAL at 08:40

## 2019-08-09 RX ADMIN — ALLOPURINOL 300 MG: 300 TABLET ORAL at 08:40

## 2019-08-09 RX ADMIN — LEVOTHYROXINE SODIUM 75 MCG: 75 TABLET ORAL at 06:43

## 2019-08-09 RX ADMIN — CARVEDILOL 3.12 MG: 3.12 TABLET, FILM COATED ORAL at 08:40

## 2019-08-09 RX ADMIN — INSULIN LISPRO 2 UNITS: 100 INJECTION, SOLUTION INTRAVENOUS; SUBCUTANEOUS at 11:41

## 2019-08-09 RX ADMIN — MOMETASONE FUROATE AND FORMOTEROL FUMARATE DIHYDRATE 2 PUFF: 200; 5 AEROSOL RESPIRATORY (INHALATION) at 08:40

## 2019-08-09 RX ADMIN — APIXABAN 2.5 MG: 2.5 TABLET, FILM COATED ORAL at 08:40

## 2019-08-09 RX ADMIN — GABAPENTIN 100 MG: 100 CAPSULE ORAL at 08:40

## 2019-08-09 NOTE — DISCHARGE INSTR - COC
Continuity of Care Form    Patient Name: Jaswinder GatesB:  1940  MRN:  [id number]    Admit date:  2019  Discharge date:  19    Code Status Order: Full Code   Advance Directives:   Advance Care Flowsheet Documentation     Date/Time Healthcare Directive Type of Healthcare Directive Copy in [address] Agent's Name Healthcare Agent's Phone Number    19 1242  No, patient does not have an advance directive for healthcare treatment -- -- -- -- --          Admitting Physician:  Ryan King MD  PCP: PHILIP Villatoro CNP    Discharging Nurse: [facility] Unit/Room#: 5445/5288-36  Discharging Unit Phone Number: [phone]    Emergency Contact:   Extended Emergency Contact Information  Primary Emergency Contact: [facility]  Address: [address] [place] [address] [address]  Phone: [phone]  Mobile Phone: [phone]  Relation: Spouse  Secondary Emergency Contact: Javier Elder  Address: [address] Phone: [phone]  Relation: Child    Past Surgical History:  Past Surgical History:   Procedure Laterality Date    APPENDECTOMY  12   [facility] APPENDECTOMY     [facility], , , 2016   [address]  10/22/13    CARDIAC CATHETERIZATION  18    mid LAD stent    CHOLECYSTECTOMY  1988    COLONOSCOPY  09    CORONARY ANGIOPLASTY WITH STENT PLACEMENT  2016    stent X 1    KNEE ARTHROSCOPY Right 1422    UMBILICAL HERNIA REPAIR      UPPER GASTROINTESTINAL ENDOSCOPY  09       Immunization History: There is no immunization history on file for this patient.     Active Problems:  Patient Active Problem List   Diagnosis Code    Chronic cystitis N30.20    Coronary artery disease involving native coronary artery of native heart without angina pectoris I25.10    GERD K21.9    Chronic cough R05    OA signature: {Esignature:408218004}    PHYSICIAN SECTION    Prognosis: {Prognosis:4954671850}    Condition at Discharge: [address] Patient Condition:364931991}    Rehab Potential (if transferring to Rehab): {Prognosis:1955395301}    Recommended Labs or Other Treatments After Discharge: ***    Physician Certification: I certify the above information and transfer of Constanza Birch  is necessary for the continuing treatment of the diagnosis listed and that she requires {Admit to Appropriate Level of Care:75567} for {GREATER/LESS:101493957} 30 days.      Update Admission H&P: {CHP DME Changes in SXQMM:215317452}    PHYSICIAN SIGNATURE:  Electronically signed by Laila Whitman MD on 8/9/19 at 8:40 AM

## 2019-08-09 NOTE — PROGRESS NOTES
Aldair Pena notified of new patient consult thru answering service.
Dr. Jason Vines calls, informed of labs,  and will put in orders.
heart without angina pectoris     GERD     Chronic cough     OA (osteoarthritis)     LV dysfunction     Edema     Microalbuminuria     Hypothyroidism     Osteopenia     Mild carotid artery disease (HCC)     Chronic gout without tophus     S/P coronary artery stent placement     Chronic renal insufficiency     Pure hypercholesterolemia     Cervical pain (neck)     Paraproteinemia     Colitis     Morbid obesity due to excess calories (HCC)     Multiple myeloma (HCC)     JAMES (obstructive sleep apnea)     Type 2 diabetes mellitus without complication, with long-term current use of insulin ([address]     Essential hypertension     Fall at home     Dyspnea on exertion     Vitamin D deficiency     Anemia     Pain of left lower leg     Cardiac amyloidosis (HCC)     Cardiomyopathy, nonischemic (HCC)     Chronic diastolic CHF (congestive heart failure) ([address]     Examination of participant in clinical trial     Organ-limited amyloidosis ([address]     Anemia     Arteriosclerosis of arteries of extremities (HCC)     Intermittent claudication ([address]     Thrush, oral     Ambulates with cane     Chest pain     Obesity, Class II, BMI 35-39.9     New onset atrial fibrillation (HCC)     NSTEMI (non-ST elevated myocardial infarction) ([address]     Hyperkalemia           Plan:  Hyperkalemia - resolved. Hyperglycemia - improved. Okay to D/C home if okay with Renal.  Continue current treatments. Follow up in the office next week with Marely Peoples CNP. Complete orders per chart.     See orders   Disposition:    Electronically signed by Colton Franco MD on 8/9/2019 at 6:58 AM

## 2019-08-10 ENCOUNTER — CARE COORDINATION (OUTPATIENT)
Dept: CASE MANAGEMENT | Age: 79
End: 2019-08-10

## 2019-08-12 ENCOUNTER — CARE COORDINATION (OUTPATIENT)
Dept: CASE MANAGEMENT | Age: 79
End: 2019-08-12

## 2019-08-12 ENCOUNTER — APPOINTMENT (OUTPATIENT)
Dept: CARDIAC REHAB | Age: 79
End: 2019-08-12
Payer: MEDICARE

## 2019-08-12 NOTE — DISCHARGE SUMMARY
[facility] Discharge Summary      Patient ID: Anastasiya Kamara    MRN: [id number]     Acct:  [de-identified]       Patient's PCP: PHILIP Aguilar CNP    Admit Date: 8/7/2019     Discharge Date: 8/9/2019      Admitting Physician: Syl Gage MD    Discharge Physician: Syl Gage MD     Discharge Diagnoses:    Primary Problem  Hyperkalemia    Active Hospital Problems    Diagnosis Date Noted    GUZMAN (acute kidney injury) ([facility] [N17.9] 08/09/2019    Hyperkalemia [E87.5] 08/07/2019    Obesity, Class II, BMI 35-39.9 [E66.9] 05/09/2019    Essential hypertension [I10] 09/07/2017    Type 2 diabetes mellitus without complication, with long-term current use of insulin ([facility] [E11.9, Z79.4] 09/07/2017    Cardiac amyloidosis ([facility] [E85.4, I43] 07/20/2017    Cardiomyopathy, nonischemic (HCC) [I42.8] 05/03/2017    Multiple myeloma ([facility] [C90.00] 03/31/2017    JAMES (obstructive sleep apnea) [G47.33] 03/31/2017    Chronic diastolic CHF (congestive heart failure) ([facility] [I50.32] 03/19/2017    Arteriosclerosis of arteries of extremities ([facility] [I70.209] 12/04/2013    Hypothyroidism [E03.9] 09/11/2012    Coronary artery disease involving native coronary artery of native heart without angina pectoris [I25.10] 09/14/2011     Past Medical History:   Diagnosis Date    Allergic rhinitis     CAD (coronary artery disease)     s/p stents  total x5    Cholelithiasis     Chronic cough 9/14/2011    Chronic cystitis 9/14/2011    Colitis 09/2016    GERD 9/14/2011    Gout     Guillain-[place] ([facility]     history of     Hyperlipidemia     Hypertension     Irritable bowel syndrome     Migraines     hx. of    Multiple myeloma ([facility]     OA (osteoarthritis) 9/14/2011    Osteoarthritis     Osteopenia     Pneumonia     Post-menopausal     vaginal atrophy    Pulmonary nodules     Raynaud's disease     S/P cardiac cath     x4    Sleep apnea     no machine at night.     Thyroid disease     Hypothyroid

## 2019-08-12 NOTE — CARE COORDINATION
[facility] Transitions Follow Up Call    2019    Patient: Petra Patel  Patient : 1940   MRN: [id number]  Reason for Admission: hyperkalemia  Discharge Date: 19 RARS: Readmission Risk Score: 34         Follow up from weekend  Writer made referral to  [facility] and mail patient some education on diets and the dietitian check list so she can all with any questions//JU    Care Transitions Subsequent and Final Call    Subsequent and Final Calls  Care Transitions Interventions     Other Services:   (Comment: chemo)   Other Interventions:             Follow Up  Future Appointments   Date Time Provider Valerie Recinos   2019  1:45 PM PHILIP Sandhu CNP [facility]   2019  9:30 AM STC CARD [facility] RM [address] [facility] [facility] [place]   2019  9:30 AM STC CARD REH[facility] RM 07 [facility]   2019  9:30 AM STC CARD REHAB RM 07 [facility]   2019  9:30 AM STC CARD REHAB RM 07 [facility]   2019  9:30 AM STC CARD REHAB RM 07 [facility]ARD[facility]   2019  9:30 AM STC CARD REHAB RM 07 [facility]   2019  9:30 AM STC CARD [facility] RM 07 [facility]   9/10/2019  9:30 AM PHILIP Sandhu CNP [facility]   2019  9:30 AM STC CARD REHAB RM [address] [facility] [place]   2019  9:30 AM STC CARD [facility] RM 07 [address] [facility] [facility] [place]   2019 10:30 AM Lynda Rivas DPM [facility] [facility]   2019  9:30 AM STC CARD REHAB RM [address] [facility] [place]   2019  9:30 AM STC CARD [facility] RM 07 [address] [facility] [place]   2019  9:30 AM STC CARD REHAB RM [address] [facility] [facility] [place]   2019  9:30 AM STC CARD REHAB RM [address] [place] [facility] [facility]   10/2/2019  9:30 AM STC CARD REHAB RM 07 [facility]   10/7/2019  9:30 AM STC CARD REHAB RM 07 [facility]   10/9/2019  9:30 AM STC CARD REHAB RM 07 [facility]   10/14/2019  9:30 AM STC CARD REHAB RM 07

## 2019-08-13 ENCOUNTER — CARE COORDINATION (OUTPATIENT)
Dept: CARE COORDINATION | Age: 79
End: 2019-08-13

## 2019-08-13 ENCOUNTER — CARE COORDINATION (OUTPATIENT)
Dept: CASE MANAGEMENT | Age: 79
End: 2019-08-13

## 2019-08-13 NOTE — CARE COORDINATION
Christine 45 Transitions Follow Up Call    2019    Patient: Lucía Martinez  Patient : 1940   MRN: [id number]  Reason for Admission: Hyperkalemia  Discharge Date: 19 RARS: Readmission Risk Score: 29         Spoke with: Lucía Martinez    Was able to contact Jacyaurora Flores for transitional outreach. She stated that she was doing \"ok\". She denied chest pain, shortness of breath, palpitations or numbness/tingling. She stated that she spoke with dietician Alise Rogers and will be getting information from her at her appointment with Kellee RICHARDSON today. No questions or concerns at this time. Encouraged her to call if need arise. Care Transitions Subsequent and Final Call    Subsequent and Final Calls  Do you have any ongoing symptoms?:  No  Have your medications changed?:  No  Do you have any questions related to your medications?:  No  Do you currently have any active services?:  No  Do you have any needs or concerns that I can assist you with?:  No  Care Transitions Interventions     Other Services:   (Comment: chemo)   Other Interventions:             Follow Up  Future Appointments   Date Time Provider Valerie Recinos   2019  1:45 PM [facility]   2019  9:30 AM STC CARD [facility] RM 07 [address] [facility] [facility] [place]   2019  9:30 AM STC CARD REHAB RM 07 [address] [facility] [place]   2019  9:30 AM STC CARD REHAB RM 07 [facility] [place]   2019  9:30 AM STC CARD REHAB RM 07 [address] [facility] [address] [facility] [place]   2019  9:30 AM STC CARD REHAB RM 07 [facility] [place]   2019  9:30 AM STC CARD REHAB RM 07 [address] [facility] [place]   2019  9:30 AM STC CARD [facility] RM 07 [address] [facility] [facility] [place]   9/10/2019  9:30 AM PHILIP Sanchez CNP  [facility]   2019  9:30 AM STC CARD [facility] RM 07 [facility]CZ [facility] [place]   2019  1:15 PM Norris Rutherford MD [facility] None   2019  9:30 AM ST CARD REHAB RM 25 [facility] [place]   9/16/2019 10:30 AM Nayeli Clayton DPCARROLL [facility] P[facility]   9/18/2019  9:30 AM STC CARD REHAB RM 07 Remi Clevelanda [facility] [place]   9/23/2019  9:30 AM STC CARD REHAB RM 07 [facility]CZ [facility] [place]   9/25/2019  9:30 AM STC CARD REHAB RM 07 [facility]CZ [facility] [place]   9/30/2019  9:30 AM STC CARD REHAB RM 07 [facility]CZ [facility] [place]   10/2/2019  9:30 AM STC CARD REHAB RM 07 [facility]CZ [facility] [place]   10/7/2019  9:30 AM STC CARD REHAB RM 07 [facility]CZ [facility] [place]   10/9/2019  9:30 AM STC CARD REHAB RM 07 [facility]CZ [facility] [place]   10/14/2019  9:30 AM STC CARD REHAB RM 07 [facility]CZ [facility] [place]   10/16/2019  9:30 AM STC CARD REHAB RM 07 [facility]CZ [facility] [place]   10/21/2019  9:30 AM STC CARD REHAB RM 07 [facility] [place]   10/23/2019  9:30 AM STC CARD REHAB RM 07 [facility] [place]   10/28/2019  9:30 AM STC CARD REHAB RM 07 [facility] [place]   10/30/2019  9:30 AM STC CARD REHAB RM 07 [facility]CZ [facility] [place]   11/4/2019  9:30 AM STC CARD REHAB RM 07 [facility] [place]   11/6/2019  9:30 AM STC CARD REHAB RM 07 [facility] [place]   11/11/2019  9:30 AM STC CARD REHAB RM 07 [facility] [place]   11/13/2019  9:30 AM STC CARD REHAB RM 07 [facility] [place]   11/18/2019  9:30 AM STC CARD REHAB RM 07 [facility] [place]   11/20/2019  9:30 AM STC CARD REHAB RM 07 [facility] [place]   11/25/2019  9:30 AM STC CARD REHAB RM 07 [facility] [place]   11/27/2019  9:30 AM STC CARD REHAB RM 07 [facility]CZ [facility] [place]   12/2/2019  9:30 AM STC CARD REHAB RM 07 [facility]CZ [facility] [place]   12/4/2019  9:30 AM STC CARD REHAB RM 07 [facility] [place]   12/9/2019  9:30 AM STC CARD REHAB RM 07 [facility] [place]   12/11/2019  9:30 AM STC CARD REHAB RM 07 [facility] [place]   12/16/2019  9:00 AM STC CARD REHAB RM 06 [facility] [place]   12/18/2019  9:00 AM STC CARD MEMO  [id number], RN

## 2019-08-14 ENCOUNTER — APPOINTMENT (OUTPATIENT)
Dept: CARDIAC REHAB | Age: 79
End: 2019-08-14
Payer: MEDICARE

## 2019-08-15 NOTE — CARE COORDINATION
Daily    Historical Provider, MD   insulin 70-30 (NOVOLIN 70/30) (70-30) 100 UNIT per ML injection vial Inject 30 Units into the skin 2 times daily     Historical Provider, MD   vitamin D (ERGOCALCIFEROL) 62648 units CAPS capsule TAKE 1 CAPSULE BY MOUTH ONE TIME A WEEK 3/28/19   Ezekiel Felix MD   magnesium oxide (MAG-OX) 400 (241.3 Mg) MG TABS tablet Take 1 tablet by mouth 2 times daily for 15 days 3/19/19 7/19/20  Ezekiel Felix MD   pantoprazole (PROTONIX) 20 MG tablet Take 1 tablet by mouth daily 11/2/18   PHILIP May CNP   ondansetron (ZOFRAN ODT) 4 MG disintegrating tablet Take 1 tablet by mouth every 8 hours as needed for Nausea 10/27/18   Ignacio Clark MD   allopurinol (ZYLOPRIM) 300 MG tablet TAKE 1 TABLET BY MOUTH ONE TIME A DAY  9/4/18   PHILIP May CNP   insulin aspart (NOVOLOG) 100 UNIT/ML injection vial Inject 6 Units into the skin 2 times daily (before meals)     Historical Provider, MD   acyclovir (ZOVIRAX) 200 MG capsule Take 200 mg by mouth 2 times daily  7/20/17   Historical Provider, MD   clopidogrel (PLAVIX) 75 MG tablet Take 75 mg by mouth daily    Historical Provider, MD   nitroGLYCERIN (NITROSTAT) 0.4 MG SL tablet Place 1 tablet under the tongue every 5 minutes as needed for Chest pain 7/21/15   PHILIP May CNP   Biotin 5000 MCG TABS Take 1 tablet by mouth nightly     Historical Provider, MD       Future Appointments   Date Time Provider Valerie Recinos   8/19/2019  9:[facility] [address] [facility] [place]   8/21/2019  9:30 AM ST CARD [facility] [address] EYE [facility] [address] [place]   8/26/2019  9:30 AM ST CARD [facility] [address] EYE [facility] [address] [place]   8/28/2019  9:30 AM STC CARD [facility] [address] EYE [facility] [address] [place]   9/2/2019  9:30 AM STC CARD REHAB RM 07 STCZ [facility] [place]   9/4/2019  9:30 AM STC CARD REHAB RM 07 STKP [facility] [place]   9/9/2019  9:30 AM STC CARD REHAB RM 07 [facility]CZ [facility] [place]   9/11/2019  9:30 AM STC CARD REHAB RM 07 STKP 12/16/2019  9:00 AM STC CARD REHAB RM 06 [facility]CZ [facility] [place]   12/18/2019  9:00 AM STC CARD REHAB RM 07 [facility] [place]     ,   Diabetes Assessment    Medic Alert ID:  No  Meal Planning:  None   How often do you test your blood sugar?:  Meals (Comment: 2-3 times day)   Do you have barriers with adherence to non-pharmacologic self-management interventions?  (Nutrition/Exercise/Self-Monitoring):  No   Have you ever had to go to the ED for symptoms of low blood sugar?:  No       No patient-reported symptoms   Do you have hyperglycemia symptoms?:  No   Do you have hypoglycemia symptoms?:  No   Last Blood Sugar Value:  156   Blood Sugar Monitoring Regimen:  Before Meals   Blood Sugar Trends:  No Change       and   Congestive Heart Failure Assessment    Are you currently restricting fluids?:  No Restriction  Do you understand a low sodium diet?:  Yes  Do you understand how to read food labels?:  Yes  How many restaurant meals do you eat per week?:  0  Do you salt your food before tasting it?:  No     No patient-reported symptoms      Symptoms:   None:  Yes      Symptom course:  stable  Patient-reported weight (lb):  209  Weight trend:  increasing steadily  Salt intake watch compared to last visit:  stable

## 2019-08-16 ENCOUNTER — CARE COORDINATION (OUTPATIENT)
Dept: CASE MANAGEMENT | Age: 79
End: 2019-08-16

## 2019-08-16 NOTE — CARE COORDINATION
9/30/2019  9:30 AM STC CARD REHAB RM 07 [facility]CZ [facility] [place]   10/2/2019  9:30 AM STC CARD REHAB RM 07 [facility] [place]   10/7/2019  9:30 AM STC CARD REHAB RM 07 [facility] [place]   10/9/2019  9:30 AM STC CARD REHAB RM 07 [facility] [place]   10/14/2019  9:30 AM STC CARD REHAB RM 07 [facility] [place]   10/15/2019  1:00 PM Rodolph Jeans, APRN - CNP [facility]   10/16/2019  9:30 AM STC CARD REHAB RM 07 [facility] [place]   10/21/2019  9:30 AM STC CARD REHAB RM 07 [facility]CZ [facility] [place]   10/23/2019  9:30 AM STC CARD REHAB RM 07 [facility]CZ [facility] [place]   10/28/2019  9:30 AM STC CARD REHAB RM 07 STCZ [facility] [place]   10/30/2019  9:30 AM STC CARD REHAB RM 07 [facility]CZ [facility] [place]   11/4/2019  9:30 AM STC CARD REHAB RM 07 [facility]CZ [facility] [place]   11/6/2019  9:30 AM STC CARD REHAB RM 07 [facility]CZ [facility] [place]   11/11/2019  9:30 AM STC CARD REHAB RM 07 [facility]CZ [facility] [place]   11/13/2019  9:30 AM STC CARD REHAB RM 07 [facility]Z [facility]   11/18/2019  9:30 AM STC CARD REHAB RM 07 [facility] [place]   11/20/2019  9:30 AM STC CARD REHAB RM 07 [facility]Z [facility] [place]   11/25/2019  9:30 AM STC CARD REHAB RM 07 [facility]Z [facility] [place]   11/27/2019  9:30 AM STC CARD REHAB RM 07 [facility]Z [facility] [place]   12/2/2019  9:30 AM STC CARD REHAB RM 07 [facility]CZ [facility] [place]   12/4/2019  9:30 AM STC CARD REHAB RM 07 [facility]Z [facility] [place]   12/9/2019  9:30 AM STC CARD REHAB RM 07 [facility]CZ [facility] [place]   12/11/2019  9:30 AM STC CARD REHAB RM 07 [facility]CZ [facility] [place]   12/16/2019  9:00 AM STC CARD REHAB RM 06 [facility]CZ [facility] [place]   12/18/2019  9:00 AM STC CARD [facility] [address], RN

## 2019-08-19 ENCOUNTER — HOSPITAL ENCOUNTER (OUTPATIENT)
Dept: CARDIAC REHAB | Age: 79
Setting detail: THERAPIES SERIES
Discharge: HOME | End: 2019-08-19
Payer: MEDICARE

## 2019-08-19 VITALS — BODY MASS INDEX: 40.05 KG/M2 | HEIGHT: 61 IN | WEIGHT: 212.1 LBS

## 2019-08-19 PROCEDURE — 93798 PHYS/QHP OP CAR RHAB W/ECG: CPT

## 2019-08-19 ASSESSMENT — PATIENT HEALTH QUESTIONNAIRE - PHQ9: SUM OF ALL RESPONSES TO PHQ QUESTIONS 1-9: 0

## 2019-08-21 ENCOUNTER — HOSPITAL ENCOUNTER (OUTPATIENT)
Dept: CARDIAC REHAB | Age: 79
Setting detail: THERAPIES SERIES
Discharge: HOME | End: 2019-08-21
Payer: MEDICARE

## 2019-08-21 VITALS — WEIGHT: 213 LBS | BODY MASS INDEX: 40.25 KG/M2

## 2019-08-21 PROCEDURE — 93798 PHYS/QHP OP CAR RHAB W/ECG: CPT

## 2019-08-22 ENCOUNTER — CARE COORDINATION (OUTPATIENT)
Dept: CASE MANAGEMENT | Age: 79
End: 2019-08-22

## 2019-08-22 NOTE — CARE COORDINATION
Christine 45 Transitions Follow Up Call    2019    Patient: Nancy Verdugo  Patient : 1940   MRN: [id number]  Reason for Admission: hyberkalemia  Discharge Date: 19 RARS: Readmission Risk Score: 34         Final call- unable to reach patient, left vm message with name and call back, requested call back with any needs or concerns, left vm message informing patient of final call//JU    Care Transitions Subsequent and Final Call    Subsequent and Final Calls  Care Transitions Interventions     Other Services:   (Comment: chemo)   Other Interventions:             Follow Up  Future Appointments   Date Time Provider Valerie Recinos   2019  9:00 AM STC CARD [facility] RM 07 [address] [facility] [address] [place]   2019  9:30 AM STC CARD REHAB RM 07 [facility] [place]   2019  9:30 AM STC CARD REHAB RM 07 [facility]CZ [facility] [place]   2019  9:30 AM STC CARD REHAB RM 07 [facility] [place]   2019  9:30 AM STC CARD REHAB RM 07 [facility] [place]   2019  9:30 AM STC CARD REHAB RM 07 [facility] [place]   2019  9:30 AM STC CARD REHAB RM 07 [facility]Z [facility] [place]   2019  1:15 PM Marly Mcdonald MD [facility] None   2019  9:30 AM STC CARD [facility] RM 07 [address] [place]   2019 10:30 AM Dee Dee Hoskins DPM [facility] PODIAT [facility]   2019  9:30 AM STC CARD REHAB RM 07 [address] [facility] [place]   2019  9:30 AM STC CARD REHAB RM 07 [address] [facility] [address] [facility] [place]   2019  9:30 AM STC CARD REHAB RM 07 [facility] [facility]   2019  9:30 AM STC CARD REHAB RM 07 [facility]Z [facility] [place]   10/2/2019  9:30 AM STC CARD REHAB RM 07 [facility]Z [facility] [place]   10/7/2019  9:30 AM STC CARD REHAB RM 07 [facility] [place]   10/9/2019  9:30 AM STC CARD REHAB RM 07 250 [facility] [place]   10/14/2019  9:30 AM STC CARD REHAB RM 07 [facility]Z [facility] [place]   10/15/2019  1:00 PM Cleavon Hope, APRN - CNP [place]  [facility]   10/16/2019  9:30 AM STC CARD REHAB RM

## 2019-08-23 ENCOUNTER — HOSPITAL ENCOUNTER (OUTPATIENT)
Dept: CARDIAC REHAB | Age: 79
Setting detail: THERAPIES SERIES
Discharge: HOME | End: 2019-08-23
Payer: MEDICARE

## 2019-08-26 ENCOUNTER — HOSPITAL ENCOUNTER (OUTPATIENT)
Dept: CARDIAC REHAB | Age: 79
Setting detail: THERAPIES SERIES
Discharge: HOME | End: 2019-08-26
Payer: MEDICARE

## 2019-08-26 VITALS — BODY MASS INDEX: 39.68 KG/M2 | WEIGHT: 210 LBS

## 2019-08-26 PROCEDURE — 93798 PHYS/QHP OP CAR RHAB W/ECG: CPT

## 2019-08-28 ENCOUNTER — HOSPITAL ENCOUNTER (OUTPATIENT)
Dept: CARDIAC REHAB | Age: 79
Setting detail: THERAPIES SERIES
Discharge: HOME | End: 2019-08-28
Payer: MEDICARE

## 2019-08-28 VITALS — BODY MASS INDEX: 40.06 KG/M2 | WEIGHT: 212 LBS

## 2019-08-28 PROCEDURE — 93798 PHYS/QHP OP CAR RHAB W/ECG: CPT

## 2019-08-30 ENCOUNTER — CARE COORDINATION (OUTPATIENT)
Dept: CARE COORDINATION | Age: 79
End: 2019-08-30

## 2019-08-30 NOTE — CARE COORDINATION
Nutrition Care Coordinator Follow-Up visit:    Food Recall: eating 2-3 meals/d    Activity Level:  Sedentary: X  Lightly Active: Moderately Active:  Very Active:    Adult BMI:  Underweight (below 18.5)  Normal Weight (18.5-24.9)  Overweight (25-29. 9)  Obese (30-39. 9)  Morbidly Obese (>40) X    Weight Change: no change    Plan:  Plan was established with patient:  Increase dietary fiber by consuming whole grains, fruits and vegetables: X  Limit dietary cholesterol to >200mg/day: Increase water intake:  Avoid added sugar: X  Avoid sweetened beverages: X  Choose lean meats: X      Monitoring: Will monitor weight:  Will monitor adherence to meal plan: Will monitor adherence to exercise plan: Will monitor HGA1c: X    Handouts Provided :  Low Carb snacking: X  Carb counting /individual meal plan: X  Portion Control: X  Food Labels: X  Physical Activity:  Low Fat/Cholesterol:  Hypo/Hyperglycemia:  Calorie Controlled Meal Plan:  High potassium foods: X    Goals: Increase water consumption to 8oz. 6-8 times daily:  Manage blood sugars by consuming 3 meals spaced every 4-5 hours with 2-3 snacks daily: reviewed  Increase fiber and decrease fat intake by consuming 1-2 fruit servings and 2-3 vegetable servings per day. Increase physical activity by: encouraged daily walking as tolerated  Consume less than 2,000mg of sodium/day  Avoid consumption of sweetened beverages and added sugar by reading food labels: discussed  Monitor blood sugars by using meter to check blood glucose before morning meal and 2 hours after a meal daily: BS <150 per patient  Decrease risk of coronary heart disease by consuming fish that contains omega-3 fatty acids at least twice a week, avoiding partially hydrogenated oil/trans fats and limiting saturated fat intake by reading food labels: discussed    Patient goals set:  1.  Reviewed patient's meal pattern, continues to skip breakfast most days.  Reviewed quick/easy breakfast suggestions and

## 2019-09-02 ENCOUNTER — APPOINTMENT (OUTPATIENT)
Dept: CARDIAC REHAB | Age: 79
End: 2019-09-02
Payer: MEDICARE

## 2019-09-04 ENCOUNTER — HOSPITAL ENCOUNTER (OUTPATIENT)
Dept: CARDIAC REHAB | Age: 79
Setting detail: THERAPIES SERIES
Discharge: HOME | End: 2019-09-04
Payer: MEDICARE

## 2019-09-04 ENCOUNTER — CARE COORDINATION (OUTPATIENT)
Dept: CARE COORDINATION | Age: 79
End: 2019-09-04

## 2019-09-04 VITALS — WEIGHT: 210.8 LBS | BODY MASS INDEX: 39.83 KG/M2

## 2019-09-04 PROCEDURE — 93798 PHYS/QHP OP CAR RHAB W/ECG: CPT

## 2019-09-09 ENCOUNTER — HOSPITAL ENCOUNTER (OUTPATIENT)
Dept: CARDIAC REHAB | Age: 79
Setting detail: THERAPIES SERIES
Discharge: HOME | End: 2019-09-09
Payer: MEDICARE

## 2019-09-09 VITALS — WEIGHT: 213 LBS | BODY MASS INDEX: 40.25 KG/M2

## 2019-09-09 PROCEDURE — 93798 PHYS/QHP OP CAR RHAB W/ECG: CPT

## 2019-09-11 ENCOUNTER — HOSPITAL ENCOUNTER (OUTPATIENT)
Dept: CARDIAC REHAB | Age: 79
Setting detail: THERAPIES SERIES
Discharge: HOME | End: 2019-09-11
Payer: MEDICARE

## 2019-09-11 VITALS — WEIGHT: 212 LBS | BODY MASS INDEX: 40.06 KG/M2

## 2019-09-11 PROBLEM — H25.13 AGE-RELATED NUCLEAR CATARACT, BILATERAL: Status: ACTIVE | Noted: 2019-09-11

## 2019-09-11 PROBLEM — E11.3393 TYPE 2 DIABETES MELLITUS WITH MODERATE NONPROLIFERATIVE DIABETIC RETINOPATHY OF BOTH EYES WITHOUT MACULAR EDEMA (HCC): Status: ACTIVE | Noted: 2017-09-07

## 2019-09-11 PROBLEM — H35.81 COTTON WOOL SPOTS: Status: ACTIVE | Noted: 2019-09-11

## 2019-09-11 PROBLEM — H35.63 RETINAL HEMORRHAGE, BILATERAL: Status: ACTIVE | Noted: 2019-09-11

## 2019-09-11 PROCEDURE — 93798 PHYS/QHP OP CAR RHAB W/ECG: CPT

## 2019-09-13 ENCOUNTER — CARE COORDINATION (OUTPATIENT)
Dept: CARE COORDINATION | Age: 79
End: 2019-09-13

## 2019-09-13 NOTE — CARE COORDINATION
(MAG-OX) 400 (241.3 Mg) MG TABS tablet Take 1 tablet by mouth 2 times daily for 15 days 3/19/19 7/19/20  Zuleyka Woodard MD   pantoprazole (PROTONIX) 20 MG tablet Take 1 tablet by mouth daily 11/2/18   PHILIP Saleh CNP   ondansetron (ZOFRAN ODT) 4 MG disintegrating tablet Take 1 tablet by mouth every 8 hours as needed for Nausea 10/27/18   Gilda Barrera MD   insulin aspart (NOVOLOG) 100 UNIT/ML injection vial Inject 6 Units into the skin 2 times daily (before meals)     Historical Provider, MD   acyclovir (ZOVIRAX) 200 MG capsule Take 200 mg by mouth 2 times daily  7/20/17   Historical Provider, MD   clopidogrel (PLAVIX) 75 MG tablet Take 75 mg by mouth daily    Historical Provider, MD   nitroGLYCERIN (NITROSTAT) 0.4 MG SL tablet Place 1 tablet under the tongue every 5 minutes as needed for Chest pain 7/21/15   PHILIP Saleh CNP   Biotin 5000 MCG TABS Take 1 tablet by mouth nightly     Historical Provider, MD       Future Appointments   Date Time Provider Valerie Recinos   9/16/2019  9:30 AM STC CARD [facility] [address] [facility] [place]   9/16/2019 10:30 AM KARLA Pena PODALEKSEY [facility]   9/18/2019  9:30 AM STC CARD REHAB [address] [facility] [place]   9/23/2019  9:30 AM STC CARD [facility] [address] [facility] [place]   9/25/2019  9:30 AM STC CARD REHAB  07 [facility] [facility] [place]   9/30/2019  9:30 AM STC CARD REHAB  07 [facility]CZ [facility] [facility] [place]   10/2/2019  9:30 AM STC CARD REHAB  07 [facility]CZ [facility] [facility] [place]   10/7/2019  9:30 AM STC CARD REHAB  07 [facility]CZ [facility] [facility] [place]   10/9/2019  9:30 AM STC CARD REHAB [address] [facility] [place]   10/14/2019  9:30 AM STC CARD [facility] 07 STCZ [facility] [place]   10/15/2019  1:00 PM Yessica Richmond, [address]   10/16/2019  9:30 AM STC CARD [facility] 07 [facility] [place]   10/21/2019  9:30 AM STC CARD [facility] 07 [facility] [place]   10/23/2019  9:30 AM STC CARD REHAB  Otto Hathaway sodium diet?:  Yes  Do you understand how to read food labels?:  Yes  How many restaurant meals do you eat per week?:  0  Do you salt your food before tasting it?:  No     No patient-reported symptoms      Symptoms:   None:  Yes      Symptom course:  stable  Weight trend:  stable  Salt intake watch compared to last visit:  stable

## 2019-09-16 ENCOUNTER — OFFICE VISIT (OUTPATIENT)
Dept: PODIATRY | Age: 79
End: 2019-09-16
Payer: MEDICARE

## 2019-09-16 ENCOUNTER — HOSPITAL ENCOUNTER (OUTPATIENT)
Dept: CARDIAC REHAB | Age: 79
Setting detail: THERAPIES SERIES
Discharge: HOME | End: 2019-09-16
Payer: MEDICARE

## 2019-09-16 VITALS — WEIGHT: 212 LBS | BODY MASS INDEX: 40.02 KG/M2 | HEIGHT: 61 IN

## 2019-09-16 DIAGNOSIS — E11.51 TYPE II DIABETES MELLITUS WITH PERIPHERAL CIRCULATORY DISORDER (HCC): Primary | ICD-10-CM

## 2019-09-16 DIAGNOSIS — B35.1 DERMATOPHYTOSIS OF NAIL: ICD-10-CM

## 2019-09-16 DIAGNOSIS — I73.9 PERIPHERAL VASCULAR DISORDER (HCC): ICD-10-CM

## 2019-09-16 DIAGNOSIS — L85.3 XEROSIS OF SKIN: ICD-10-CM

## 2019-09-16 PROCEDURE — 99213 OFFICE O/P EST LOW 20 MIN: CPT | Performed by: PODIATRIST

## 2019-09-16 PROCEDURE — G8417 CALC BMI ABV UP PARAM F/U: HCPCS | Performed by: PODIATRIST

## 2019-09-16 PROCEDURE — G8598 ASA/ANTIPLAT THER USED: HCPCS | Performed by: PODIATRIST

## 2019-09-16 PROCEDURE — G8399 PT W/DXA RESULTS DOCUMENT: HCPCS | Performed by: PODIATRIST

## 2019-09-16 PROCEDURE — 1123F ACP DISCUSS/DSCN MKR DOCD: CPT | Performed by: PODIATRIST

## 2019-09-16 PROCEDURE — 1090F PRES/ABSN URINE INCON ASSESS: CPT | Performed by: PODIATRIST

## 2019-09-16 PROCEDURE — 11721 DEBRIDE NAIL 6 OR MORE: CPT | Performed by: PODIATRIST

## 2019-09-16 PROCEDURE — 4040F PNEUMOC VAC/ADMIN/RCVD: CPT | Performed by: PODIATRIST

## 2019-09-16 PROCEDURE — 1036F TOBACCO NON-USER: CPT | Performed by: PODIATRIST

## 2019-09-16 PROCEDURE — G8427 DOCREV CUR MEDS BY ELIG CLIN: HCPCS | Performed by: PODIATRIST

## 2019-09-16 NOTE — PROGRESS NOTES
 Migraines     hx. of    Multiple myeloma ([facility] [address]     OA (osteoarthritis) 9/14/2011    Osteoarthritis     Osteopenia     Pneumonia     Post-menopausal     vaginal atrophy    Pulmonary nodules     Raynaud's disease     S/P cardiac cath     x4    Sleep apnea     no machine at night.  Thyroid disease     Hypothyroid    Type 2 diabetes mellitus 9/14/2011    Type II or unspecified type diabetes mellitus without mention of complication, not stated as uncontrolled        Allergies   Allergen Reactions    Hepatitis B Virus Vaccine Other (See Comments)     [place] (toxic reaction)    Norvasc [Amlodipine Besylate]      Swelling. COMMON SIDE EFFECT      Tramadol      Other reaction(s): Intolerance  Seizures felt to be related    Fentanyl Nausea And Vomiting    Percocet [Oxycodone-Acetaminophen] Nausea And Vomiting    Velcade [Bortezomib] Diarrhea and Rash     Current Outpatient Medications on File Prior to Visit   Medication Sig Dispense Refill    torsemide (DEMADEX) 20 MG tablet Take 20 mg by mouth 2 times daily      allopurinol (ZYLOPRIM) 300 MG tablet TAKE 1 TABLET BY MOUTH ONE TIME A DAY  30 tablet 9    albuterol sulfate HFA (PROVENTIL HFA) 108 (90 Base) MCG/ACT inhaler Inhale 2 puffs into the lungs every 6 hours as needed for Wheezing 1 Inhaler 3    gabapentin (NEURONTIN) 100 MG capsule Take 1 capsule by mouth 2 times daily.  90 capsule 3    Multiple Vitamins-Minerals (THERAPEUTIC MULTIVITAMIN-MINERALS) tablet Take 1 tablet by mouth daily 30 tablet 11    fluticasone-salmeterol (ADVAIR DISKUS) 100-50 MCG/DOSE diskus inhaler INHALE ONE PUFF BY MOUTH EVERY 12 HOURS 60 each 3    bortezomib (VELCADE) 3.5 MG chemo innjection Infuse 1 mg/m2 intravenously every 14 days       carvedilol (COREG) 3.125 MG tablet Take 3.125 mg by mouth 2 times daily      Multiple Vitamins-Minerals (PRESERVISION AREDS) CAPS Take 1 capsule by mouth nightly      rosuvastatin (CRESTOR) 20 MG tablet Take 20 mg by

## 2019-09-18 ENCOUNTER — HOSPITAL ENCOUNTER (OUTPATIENT)
Dept: CARDIAC REHAB | Age: 79
Setting detail: THERAPIES SERIES
Discharge: HOME | End: 2019-09-18
Payer: MEDICARE

## 2019-09-18 VITALS — BODY MASS INDEX: 40.26 KG/M2 | WEIGHT: 213.1 LBS

## 2019-09-18 PROCEDURE — 93798 PHYS/QHP OP CAR RHAB W/ECG: CPT

## 2019-09-23 ENCOUNTER — APPOINTMENT (OUTPATIENT)
Dept: CARDIAC REHAB | Age: 79
End: 2019-09-23
Payer: MEDICARE

## 2019-09-24 ENCOUNTER — HOSPITAL ENCOUNTER (OUTPATIENT)
Dept: CARDIAC REHAB | Age: 79
Setting detail: THERAPIES SERIES
Discharge: HOME | End: 2019-09-24
Payer: MEDICARE

## 2019-09-24 VITALS — BODY MASS INDEX: 40.08 KG/M2 | WEIGHT: 212.1 LBS

## 2019-09-24 PROCEDURE — 93798 PHYS/QHP OP CAR RHAB W/ECG: CPT

## 2019-09-25 ENCOUNTER — APPOINTMENT (OUTPATIENT)
Dept: CARDIAC REHAB | Age: 79
End: 2019-09-25
Payer: MEDICARE

## 2019-09-25 ENCOUNTER — CARE COORDINATION (OUTPATIENT)
Dept: CARE COORDINATION | Age: 79
End: 2019-09-25

## 2019-09-26 ENCOUNTER — APPOINTMENT (OUTPATIENT)
Dept: CARDIAC REHAB | Age: 79
End: 2019-09-26
Payer: MEDICARE

## 2019-09-30 ENCOUNTER — APPOINTMENT (OUTPATIENT)
Dept: CARDIAC REHAB | Age: 79
End: 2019-09-30
Payer: MEDICARE

## 2019-10-01 ENCOUNTER — HOSPITAL ENCOUNTER (OUTPATIENT)
Dept: CARDIAC REHAB | Age: 79
Setting detail: THERAPIES SERIES
Discharge: HOME | End: 2019-10-01
Payer: MEDICARE

## 2019-10-01 VITALS — BODY MASS INDEX: 39.26 KG/M2 | WEIGHT: 207.8 LBS

## 2019-10-01 PROCEDURE — 93798 PHYS/QHP OP CAR RHAB W/ECG: CPT

## 2019-10-02 ENCOUNTER — APPOINTMENT (OUTPATIENT)
Dept: CARDIAC REHAB | Age: 79
End: 2019-10-02
Payer: MEDICARE

## 2019-10-03 ENCOUNTER — HOSPITAL ENCOUNTER (OUTPATIENT)
Dept: CARDIAC REHAB | Age: 79
Setting detail: THERAPIES SERIES
Discharge: HOME | End: 2019-10-03
Payer: MEDICARE

## 2019-10-03 VITALS — BODY MASS INDEX: 39.66 KG/M2 | WEIGHT: 209.9 LBS

## 2019-10-03 PROCEDURE — 93798 PHYS/QHP OP CAR RHAB W/ECG: CPT

## 2019-10-07 ENCOUNTER — APPOINTMENT (OUTPATIENT)
Dept: CARDIAC REHAB | Age: 79
End: 2019-10-07
Payer: MEDICARE

## 2019-10-08 ENCOUNTER — HOSPITAL ENCOUNTER (OUTPATIENT)
Dept: CARDIAC REHAB | Age: 79
Setting detail: THERAPIES SERIES
Discharge: HOME | End: 2019-10-08
Payer: MEDICARE

## 2019-10-09 ENCOUNTER — APPOINTMENT (OUTPATIENT)
Dept: CARDIAC REHAB | Age: 79
End: 2019-10-09
Payer: MEDICARE

## 2019-10-10 ENCOUNTER — APPOINTMENT (OUTPATIENT)
Dept: CARDIAC REHAB | Age: 79
End: 2019-10-10
Payer: MEDICARE

## 2019-10-14 ENCOUNTER — APPOINTMENT (OUTPATIENT)
Dept: CARDIAC REHAB | Age: 79
End: 2019-10-14
Payer: MEDICARE

## 2019-10-15 ENCOUNTER — HOSPITAL ENCOUNTER (OUTPATIENT)
Dept: CARDIAC REHAB | Age: 79
Setting detail: THERAPIES SERIES
Discharge: HOME | End: 2019-10-15
Payer: MEDICARE

## 2019-10-15 VITALS — WEIGHT: 210.1 LBS | BODY MASS INDEX: 39.7 KG/M2

## 2019-10-15 PROCEDURE — 93798 PHYS/QHP OP CAR RHAB W/ECG: CPT

## 2019-10-16 ENCOUNTER — CARE COORDINATION (OUTPATIENT)
Dept: CARE COORDINATION | Age: 79
End: 2019-10-16

## 2019-10-16 ENCOUNTER — APPOINTMENT (OUTPATIENT)
Dept: CARDIAC REHAB | Age: 79
End: 2019-10-16
Payer: MEDICARE

## 2019-10-17 ENCOUNTER — HOSPITAL ENCOUNTER (OUTPATIENT)
Dept: CARDIAC REHAB | Age: 79
Setting detail: THERAPIES SERIES
Discharge: HOME | End: 2019-10-17
Payer: MEDICARE

## 2019-10-17 VITALS — BODY MASS INDEX: 40.3 KG/M2 | WEIGHT: 213.2 LBS

## 2019-10-17 PROCEDURE — 93798 PHYS/QHP OP CAR RHAB W/ECG: CPT

## 2019-10-18 ENCOUNTER — CARE COORDINATION (OUTPATIENT)
Dept: CARE COORDINATION | Age: 79
End: 2019-10-18

## 2019-10-21 ENCOUNTER — APPOINTMENT (OUTPATIENT)
Dept: CARDIAC REHAB | Age: 79
End: 2019-10-21
Payer: MEDICARE

## 2019-10-22 ENCOUNTER — HOSPITAL ENCOUNTER (OUTPATIENT)
Dept: CARDIAC REHAB | Age: 79
Setting detail: THERAPIES SERIES
Discharge: HOME | End: 2019-10-22
Payer: MEDICARE

## 2019-10-23 ENCOUNTER — APPOINTMENT (OUTPATIENT)
Dept: CARDIAC REHAB | Age: 79
End: 2019-10-23
Payer: MEDICARE

## 2019-10-24 ENCOUNTER — HOSPITAL ENCOUNTER (OUTPATIENT)
Dept: CARDIAC REHAB | Age: 79
Setting detail: THERAPIES SERIES
Discharge: HOME | End: 2019-10-24
Payer: MEDICARE

## 2019-10-24 VITALS — HEIGHT: 61 IN | BODY MASS INDEX: 40.67 KG/M2 | WEIGHT: 215.4 LBS

## 2019-10-24 PROCEDURE — 93798 PHYS/QHP OP CAR RHAB W/ECG: CPT

## 2019-10-28 ENCOUNTER — APPOINTMENT (OUTPATIENT)
Dept: CARDIAC REHAB | Age: 79
End: 2019-10-28
Payer: MEDICARE

## 2019-10-29 ENCOUNTER — HOSPITAL ENCOUNTER (OUTPATIENT)
Dept: CARDIAC REHAB | Age: 79
Setting detail: THERAPIES SERIES
Discharge: HOME | End: 2019-10-29
Payer: MEDICARE

## 2019-10-30 ENCOUNTER — APPOINTMENT (OUTPATIENT)
Dept: CARDIAC REHAB | Age: 79
End: 2019-10-30
Payer: MEDICARE

## 2019-10-31 ENCOUNTER — HOSPITAL ENCOUNTER (OUTPATIENT)
Dept: CARDIAC REHAB | Age: 79
Setting detail: THERAPIES SERIES
Discharge: HOME | End: 2019-10-31
Payer: MEDICARE

## 2019-10-31 VITALS — BODY MASS INDEX: 40.59 KG/M2 | WEIGHT: 214.8 LBS

## 2019-10-31 PROCEDURE — 93798 PHYS/QHP OP CAR RHAB W/ECG: CPT

## 2019-11-04 ENCOUNTER — APPOINTMENT (OUTPATIENT)
Dept: CARDIAC REHAB | Age: 79
End: 2019-11-04
Payer: MEDICARE

## 2019-11-05 ENCOUNTER — HOSPITAL ENCOUNTER (OUTPATIENT)
Dept: CARDIAC REHAB | Age: 79
Setting detail: THERAPIES SERIES
Discharge: HOME | End: 2019-11-05
Payer: MEDICARE

## 2019-11-05 VITALS — BODY MASS INDEX: 40.81 KG/M2 | WEIGHT: 216 LBS

## 2019-11-05 PROCEDURE — 93798 PHYS/QHP OP CAR RHAB W/ECG: CPT

## 2019-11-06 ENCOUNTER — APPOINTMENT (OUTPATIENT)
Dept: CARDIAC REHAB | Age: 79
End: 2019-11-06
Payer: MEDICARE

## 2019-11-07 ENCOUNTER — HOSPITAL ENCOUNTER (OUTPATIENT)
Dept: CARDIAC REHAB | Age: 79
Setting detail: THERAPIES SERIES
Discharge: HOME | End: 2019-11-07
Payer: MEDICARE

## 2019-11-11 ENCOUNTER — APPOINTMENT (OUTPATIENT)
Dept: CARDIAC REHAB | Age: 79
End: 2019-11-11
Payer: MEDICARE

## 2019-11-12 ENCOUNTER — HOSPITAL ENCOUNTER (OUTPATIENT)
Dept: CARDIAC REHAB | Age: 79
Setting detail: THERAPIES SERIES
Discharge: HOME | End: 2019-11-12
Payer: MEDICARE

## 2019-11-13 ENCOUNTER — APPOINTMENT (OUTPATIENT)
Dept: CARDIAC REHAB | Age: 79
End: 2019-11-13
Payer: MEDICARE

## 2019-11-14 ENCOUNTER — HOSPITAL ENCOUNTER (OUTPATIENT)
Dept: CARDIAC REHAB | Age: 79
Setting detail: THERAPIES SERIES
Discharge: HOME | End: 2019-11-14
Payer: MEDICARE

## 2019-11-14 VITALS — WEIGHT: 216 LBS | BODY MASS INDEX: 40.81 KG/M2

## 2019-11-14 PROCEDURE — 93798 PHYS/QHP OP CAR RHAB W/ECG: CPT

## 2020-01-01 ENCOUNTER — HOSPITAL ENCOUNTER (OUTPATIENT)
Dept: CARDIAC REHAB | Age: 80
Setting detail: THERAPIES SERIES
Discharge: HOME | End: 2020-01-09
Payer: MEDICARE

## 2020-01-01 ENCOUNTER — HOSPITAL ENCOUNTER (OUTPATIENT)
Dept: CARDIAC REHAB | Age: 80
Setting detail: THERAPIES SERIES
Discharge: HOME | End: 2020-02-04
Payer: MEDICARE

## 2020-01-01 ENCOUNTER — APPOINTMENT (OUTPATIENT)
Dept: GENERAL RADIOLOGY | Age: 80
DRG: 303 | End: 2020-01-01
Payer: MEDICARE

## 2020-01-01 ENCOUNTER — OFFICE VISIT (OUTPATIENT)
Dept: PODIATRY | Age: 80
End: 2020-01-01
Payer: MEDICARE

## 2020-01-01 ENCOUNTER — HOSPITAL ENCOUNTER (INPATIENT)
Age: 80
LOS: 4 days | Discharge: HOME | DRG: 303 | End: 2020-01-15
Attending: EMERGENCY MEDICINE | Admitting: FAMILY MEDICINE
Payer: MEDICARE

## 2020-01-01 ENCOUNTER — CARE COORDINATION (OUTPATIENT)
Dept: CASE MANAGEMENT | Age: 80
End: 2020-01-01

## 2020-01-01 ENCOUNTER — HOSPITAL ENCOUNTER (OUTPATIENT)
Dept: CARDIAC REHAB | Age: 80
Setting detail: THERAPIES SERIES
Discharge: HOME | End: 2020-01-07
Payer: MEDICARE

## 2020-01-01 ENCOUNTER — APPOINTMENT (OUTPATIENT)
Dept: CARDIAC REHAB | Age: 80
End: 2020-01-01
Payer: MEDICARE

## 2020-01-01 ENCOUNTER — APPOINTMENT (OUTPATIENT)
Dept: CT IMAGING | Age: 80
DRG: 303 | End: 2020-01-01
Payer: MEDICARE

## 2020-01-01 ENCOUNTER — HOSPITAL ENCOUNTER (EMERGENCY)
Age: 80
Discharge: HOME | End: 2020-01-02
Attending: EMERGENCY MEDICINE
Payer: MEDICARE

## 2020-01-01 ENCOUNTER — CARE COORDINATION (OUTPATIENT)
Dept: CARE COORDINATION | Age: 80
End: 2020-01-01

## 2020-01-01 ENCOUNTER — HOSPITAL ENCOUNTER (OUTPATIENT)
Age: 80
Setting detail: SPECIMEN
Discharge: HOME | End: 2020-09-04
Payer: MEDICARE

## 2020-01-01 ENCOUNTER — HOSPITAL ENCOUNTER (OUTPATIENT)
Dept: CARDIAC REHAB | Age: 80
Setting detail: THERAPIES SERIES
Discharge: HOME | End: 2020-02-18
Payer: MEDICARE

## 2020-01-01 ENCOUNTER — HOSPITAL ENCOUNTER (OUTPATIENT)
Dept: CARDIAC REHAB | Age: 80
Setting detail: THERAPIES SERIES
Discharge: HOME | End: 2020-02-25
Payer: MEDICARE

## 2020-01-01 ENCOUNTER — HOSPITAL ENCOUNTER (OUTPATIENT)
Dept: CARDIAC REHAB | Age: 80
Setting detail: THERAPIES SERIES
Discharge: HOME | End: 2020-01-23
Payer: MEDICARE

## 2020-01-01 ENCOUNTER — HOSPITAL ENCOUNTER (OUTPATIENT)
Dept: CARDIAC REHAB | Age: 80
Setting detail: THERAPIES SERIES
Discharge: HOME | End: 2020-02-27
Payer: MEDICARE

## 2020-01-01 ENCOUNTER — HOSPITAL ENCOUNTER (OUTPATIENT)
Dept: CARDIAC REHAB | Age: 80
Setting detail: THERAPIES SERIES
Discharge: HOME | End: 2020-03-12
Payer: MEDICARE

## 2020-01-01 ENCOUNTER — HOSPITAL ENCOUNTER (OUTPATIENT)
Dept: CARDIAC REHAB | Age: 80
Setting detail: THERAPIES SERIES
Discharge: HOME | End: 2020-03-03
Payer: MEDICARE

## 2020-01-01 ENCOUNTER — HOSPITAL ENCOUNTER (OUTPATIENT)
Dept: CARDIAC REHAB | Age: 80
Setting detail: THERAPIES SERIES
Discharge: HOME | End: 2020-01-30
Payer: MEDICARE

## 2020-01-01 ENCOUNTER — HOSPITAL ENCOUNTER (OUTPATIENT)
Dept: CARDIAC REHAB | Age: 80
Setting detail: THERAPIES SERIES
Discharge: HOME | End: 2020-03-10
Payer: MEDICARE

## 2020-01-01 ENCOUNTER — HOSPITAL ENCOUNTER (OUTPATIENT)
Age: 80
Setting detail: SPECIMEN
Discharge: HOME | End: 2020-04-21
Payer: MEDICARE

## 2020-01-01 ENCOUNTER — HOSPITAL ENCOUNTER (OUTPATIENT)
Dept: CARDIAC REHAB | Age: 80
Setting detail: THERAPIES SERIES
Discharge: HOME | End: 2020-02-06
Payer: MEDICARE

## 2020-01-01 ENCOUNTER — HOSPITAL ENCOUNTER (EMERGENCY)
Age: 80
End: 2020-11-17
Attending: EMERGENCY MEDICINE
Payer: MEDICARE

## 2020-01-01 ENCOUNTER — HOSPITAL ENCOUNTER (OUTPATIENT)
Dept: CARDIAC REHAB | Age: 80
Setting detail: THERAPIES SERIES
Discharge: HOME | End: 2020-01-21
Payer: MEDICARE

## 2020-01-01 ENCOUNTER — HOSPITAL ENCOUNTER (OUTPATIENT)
Dept: CARDIAC REHAB | Age: 80
Setting detail: THERAPIES SERIES
Discharge: HOME | End: 2020-02-11
Payer: MEDICARE

## 2020-01-01 ENCOUNTER — HOSPITAL ENCOUNTER (OUTPATIENT)
Dept: CARDIAC REHAB | Age: 80
Setting detail: THERAPIES SERIES
Discharge: HOME | End: 2020-03-05
Payer: MEDICARE

## 2020-01-01 ENCOUNTER — HOSPITAL ENCOUNTER (OUTPATIENT)
Age: 80
Setting detail: SPECIMEN
Discharge: HOME | End: 2020-02-21
Payer: MEDICARE

## 2020-01-01 ENCOUNTER — HOSPITAL ENCOUNTER (OUTPATIENT)
Dept: CARDIAC REHAB | Age: 80
Setting detail: THERAPIES SERIES
Discharge: HOME | End: 2020-01-28
Payer: MEDICARE

## 2020-01-01 ENCOUNTER — HOSPITAL ENCOUNTER (OUTPATIENT)
Dept: CARDIAC REHAB | Age: 80
Setting detail: THERAPIES SERIES
Discharge: HOME | End: 2020-01-02
Payer: MEDICARE

## 2020-01-01 ENCOUNTER — APPOINTMENT (OUTPATIENT)
Dept: GENERAL RADIOLOGY | Age: 80
End: 2020-01-01
Payer: MEDICARE

## 2020-01-01 ENCOUNTER — HOSPITAL ENCOUNTER (OUTPATIENT)
Dept: CARDIAC CATH/INVASIVE PROCEDURES | Age: 80
Discharge: HOME | End: 2020-01-14
Payer: MEDICARE

## 2020-01-01 ENCOUNTER — HOSPITAL ENCOUNTER (OUTPATIENT)
Dept: VASCULAR LAB | Age: 80
Discharge: HOME | End: 2020-07-08
Payer: MEDICARE

## 2020-01-01 ENCOUNTER — HOSPITAL ENCOUNTER (OUTPATIENT)
Dept: WOMENS IMAGING | Age: 80
Discharge: HOME | End: 2020-06-24
Payer: MEDICARE

## 2020-01-01 ENCOUNTER — HOSPITAL ENCOUNTER (OUTPATIENT)
Dept: CARDIAC REHAB | Age: 80
Setting detail: THERAPIES SERIES
Discharge: HOME | End: 2020-01-14
Payer: MEDICARE

## 2020-01-01 VITALS
RESPIRATION RATE: 18 BRPM | HEART RATE: 60 BPM | SYSTOLIC BLOOD PRESSURE: 157 MMHG | TEMPERATURE: 98 F | DIASTOLIC BLOOD PRESSURE: 71 MMHG | WEIGHT: 217 LBS | OXYGEN SATURATION: 99 % | BODY MASS INDEX: 40.97 KG/M2 | HEIGHT: 61 IN

## 2020-01-01 VITALS — BODY MASS INDEX: 41.46 KG/M2 | WEIGHT: 212.3 LBS

## 2020-01-01 VITALS — BODY MASS INDEX: 39.84 KG/M2 | TEMPERATURE: 98.4 F | WEIGHT: 211 LBS | HEIGHT: 61 IN

## 2020-01-01 VITALS — BODY MASS INDEX: 41.37 KG/M2 | RESPIRATION RATE: 24 BRPM | HEIGHT: 62 IN | OXYGEN SATURATION: 93 % | HEART RATE: 65 BPM

## 2020-01-01 VITALS — BODY MASS INDEX: 41 KG/M2 | WEIGHT: 217 LBS

## 2020-01-01 VITALS — HEIGHT: 60 IN | TEMPERATURE: 96.8 F | BODY MASS INDEX: 41.43 KG/M2 | WEIGHT: 211 LBS

## 2020-01-01 VITALS — WEIGHT: 211 LBS | BODY MASS INDEX: 41.43 KG/M2 | TEMPERATURE: 96.8 F | HEIGHT: 60 IN

## 2020-01-01 VITALS — BODY MASS INDEX: 41.05 KG/M2 | WEIGHT: 210.2 LBS

## 2020-01-01 VITALS
OXYGEN SATURATION: 95 % | DIASTOLIC BLOOD PRESSURE: 53 MMHG | BODY MASS INDEX: 40.5 KG/M2 | WEIGHT: 214.51 LBS | TEMPERATURE: 98.3 F | HEIGHT: 61 IN | HEART RATE: 77 BPM | RESPIRATION RATE: 16 BRPM | SYSTOLIC BLOOD PRESSURE: 127 MMHG

## 2020-01-01 VITALS — BODY MASS INDEX: 42.01 KG/M2 | HEIGHT: 60 IN | WEIGHT: 214 LBS

## 2020-01-01 VITALS — WEIGHT: 217 LBS | BODY MASS INDEX: 42.38 KG/M2

## 2020-01-01 VITALS — BODY MASS INDEX: 41.02 KG/M2 | WEIGHT: 217 LBS

## 2020-01-01 VITALS — WEIGHT: 217.4 LBS | BODY MASS INDEX: 41.08 KG/M2

## 2020-01-01 VITALS — TEMPERATURE: 97 F | WEIGHT: 211 LBS | HEIGHT: 61 IN | BODY MASS INDEX: 39.84 KG/M2

## 2020-01-01 VITALS — WEIGHT: 213 LBS | BODY MASS INDEX: 41.6 KG/M2

## 2020-01-01 VITALS — SYSTOLIC BLOOD PRESSURE: 146 MMHG | DIASTOLIC BLOOD PRESSURE: 71 MMHG

## 2020-01-01 LAB
-: NORMAL
ABSOLUTE EOS #: 0 K/UL (ref 0–0.4)
ABSOLUTE EOS #: 0 K/UL (ref 0–0.4)
ABSOLUTE EOS #: 0.06 K/UL (ref 0–0.44)
ABSOLUTE EOS #: 0.15 K/UL (ref 0–0.44)
ABSOLUTE IMMATURE GRANULOCYTE: <0.03 K/UL (ref 0–0.3)
ABSOLUTE IMMATURE GRANULOCYTE: <0.03 K/UL (ref 0–0.3)
ABSOLUTE IMMATURE GRANULOCYTE: ABNORMAL K/UL (ref 0–0.3)
ABSOLUTE IMMATURE GRANULOCYTE: ABNORMAL K/UL (ref 0–0.3)
ABSOLUTE LYMPH #: 0.7 K/UL (ref 1–4.8)
ABSOLUTE LYMPH #: 1.3 K/UL (ref 1–4.8)
ABSOLUTE LYMPH #: 1.44 K/UL (ref 1.1–3.7)
ABSOLUTE LYMPH #: 2.09 K/UL (ref 1.1–3.7)
ABSOLUTE MONO #: 0.1 K/UL (ref 0.1–1.3)
ABSOLUTE MONO #: 0.38 K/UL (ref 0.1–1.2)
ABSOLUTE MONO #: 0.53 K/UL (ref 0.1–1.2)
ABSOLUTE MONO #: 0.6 K/UL (ref 0.1–1.3)
ACETAMINOPHEN LEVEL: <5 UG/ML (ref 10–30)
ALBUMIN SERPL-MCNC: 3 G/DL (ref 3.5–5.2)
ALBUMIN SERPL-MCNC: 3.5 G/DL (ref 3.5–5.2)
ALBUMIN/GLOBULIN RATIO: 1.1 (ref 1–2.5)
ALP BLD-CCNC: 145 U/L (ref 35–104)
ALT SERPL-CCNC: 21 U/L (ref 5–33)
AMORPHOUS: NORMAL
ANION GAP SERPL CALCULATED.3IONS-SCNC: 11 MMOL/L (ref 9–17)
ANION GAP SERPL CALCULATED.3IONS-SCNC: 11 MMOL/L (ref 9–17)
ANION GAP SERPL CALCULATED.3IONS-SCNC: 12 MMOL/L (ref 9–17)
ANION GAP SERPL CALCULATED.3IONS-SCNC: 14 MMOL/L (ref 9–17)
ANION GAP SERPL CALCULATED.3IONS-SCNC: 16 MMOL/L (ref 9–17)
ANTI-XA UNFRAC HEPARIN: >1.1 IU/L (ref 0.3–0.7)
AST SERPL-CCNC: 26 U/L
BACTERIA: NORMAL
BASOPHILS # BLD: 0 % (ref 0–2)
BASOPHILS # BLD: 1 % (ref 0–2)
BASOPHILS ABSOLUTE: 0 K/UL (ref 0–0.2)
BASOPHILS ABSOLUTE: 0 K/UL (ref 0–0.2)
BASOPHILS ABSOLUTE: 0.03 K/UL (ref 0–0.2)
BASOPHILS ABSOLUTE: <0.03 K/UL (ref 0–0.2)
BILIRUB SERPL-MCNC: 0.27 MG/DL (ref 0.3–1.2)
BILIRUBIN URINE: NEGATIVE
BNP INTERPRETATION: ABNORMAL
BNP INTERPRETATION: ABNORMAL
BUN BLDV-MCNC: 22 MG/DL (ref 8–23)
BUN BLDV-MCNC: 26 MG/DL (ref 8–23)
BUN BLDV-MCNC: 28 MG/DL (ref 8–23)
BUN BLDV-MCNC: 29 MG/DL (ref 8–23)
BUN BLDV-MCNC: 31 MG/DL (ref 8–23)
BUN BLDV-MCNC: 32 MG/DL (ref 8–23)
BUN BLDV-MCNC: 34 MG/DL (ref 8–23)
BUN BLDV-MCNC: 34 MG/DL (ref 8–23)
BUN/CREAT BLD: ABNORMAL (ref 9–20)
CALCIUM SERPL-MCNC: 8.6 MG/DL (ref 8.6–10.4)
CALCIUM SERPL-MCNC: 8.8 MG/DL (ref 8.6–10.4)
CALCIUM SERPL-MCNC: 9.1 MG/DL (ref 8.6–10.4)
CALCIUM SERPL-MCNC: 9.1 MG/DL (ref 8.6–10.4)
CALCIUM SERPL-MCNC: 9.5 MG/DL (ref 8.6–10.4)
CALCIUM SERPL-MCNC: 9.6 MG/DL (ref 8.6–10.4)
CALCIUM SERPL-MCNC: 9.7 MG/DL (ref 8.6–10.4)
CALCIUM SERPL-MCNC: 9.9 MG/DL (ref 8.6–10.4)
CASTS UA: NORMAL /LPF
CHLORIDE BLD-SCNC: 89 MMOL/L (ref 98–107)
CHLORIDE BLD-SCNC: 93 MMOL/L (ref 98–107)
CHLORIDE BLD-SCNC: 94 MMOL/L (ref 98–107)
CHLORIDE BLD-SCNC: 96 MMOL/L (ref 98–107)
CHLORIDE BLD-SCNC: 98 MMOL/L (ref 98–107)
CHLORIDE BLD-SCNC: 98 MMOL/L (ref 98–107)
CHLORIDE BLD-SCNC: 99 MMOL/L (ref 98–107)
CHLORIDE BLD-SCNC: 99 MMOL/L (ref 98–107)
CHOLESTEROL/HDL RATIO: 3
CHOLESTEROL: 193 MG/DL
CO2: 25 MMOL/L (ref 20–31)
CO2: 25 MMOL/L (ref 20–31)
CO2: 26 MMOL/L (ref 20–31)
CO2: 27 MMOL/L (ref 20–31)
CO2: 28 MMOL/L (ref 20–31)
CO2: 30 MMOL/L (ref 20–31)
COLOR: YELLOW
COMMENT UA: ABNORMAL
CREAT SERPL-MCNC: 0.98 MG/DL (ref 0.5–0.9)
CREAT SERPL-MCNC: 1.04 MG/DL (ref 0.5–0.9)
CREAT SERPL-MCNC: 1.09 MG/DL (ref 0.5–0.9)
CREAT SERPL-MCNC: 1.15 MG/DL (ref 0.5–0.9)
CREAT SERPL-MCNC: 1.24 MG/DL (ref 0.5–0.9)
CREAT SERPL-MCNC: 1.24 MG/DL (ref 0.5–0.9)
CREAT SERPL-MCNC: 1.27 MG/DL (ref 0.5–0.9)
CREAT SERPL-MCNC: 1.4 MG/DL (ref 0.5–0.9)
CREATININE URINE: 145 MG/DL (ref 28–217)
CREATININE URINE: 26.2 MG/DL (ref 28–217)
CRYSTALS, UA: NORMAL /HPF
D-DIMER QUANTITATIVE: 1.12 MG/L FEU (ref 0–0.59)
DIFFERENTIAL TYPE: ABNORMAL
DIFFERENTIAL TYPE: NORMAL
EKG ATRIAL RATE: 113 BPM
EKG ATRIAL RATE: 79 BPM
EKG Q-T INTERVAL: 368 MS
EKG Q-T INTERVAL: 486 MS
EKG QRS DURATION: 156 MS
EKG QRS DURATION: 76 MS
EKG QTC CALCULATION (BAZETT): 420 MS
EKG QTC CALCULATION (BAZETT): 462 MS
EKG R AXIS: 101 DEGREES
EKG R AXIS: 67 DEGREES
EKG T AXIS: 47 DEGREES
EKG T AXIS: 92 DEGREES
EKG VENTRICULAR RATE: 45 BPM
EKG VENTRICULAR RATE: 95 BPM
EOSINOPHILS RELATIVE PERCENT: 0 % (ref 0–4)
EOSINOPHILS RELATIVE PERCENT: 1 % (ref 0–4)
EOSINOPHILS RELATIVE PERCENT: 1 % (ref 1–4)
EOSINOPHILS RELATIVE PERCENT: 2 % (ref 1–4)
EPITHELIAL CELLS UA: NORMAL /HPF
ETHANOL PERCENT: <0.01 %
ETHANOL: <10 MG/DL
GFR AFRICAN AMERICAN: 44 ML/MIN
GFR AFRICAN AMERICAN: 49 ML/MIN
GFR AFRICAN AMERICAN: 50 ML/MIN
GFR AFRICAN AMERICAN: 50 ML/MIN
GFR AFRICAN AMERICAN: 55 ML/MIN
GFR AFRICAN AMERICAN: 59 ML/MIN
GFR AFRICAN AMERICAN: >60 ML/MIN
GFR AFRICAN AMERICAN: >60 ML/MIN
GFR NON-AFRICAN AMERICAN: 36 ML/MIN
GFR NON-AFRICAN AMERICAN: 40 ML/MIN
GFR NON-AFRICAN AMERICAN: 42 ML/MIN
GFR NON-AFRICAN AMERICAN: 42 ML/MIN
GFR NON-AFRICAN AMERICAN: 45 ML/MIN
GFR NON-AFRICAN AMERICAN: 48 ML/MIN
GFR NON-AFRICAN AMERICAN: 51 ML/MIN
GFR NON-AFRICAN AMERICAN: 55 ML/MIN
GFR SERPL CREATININE-BSD FRML MDRD: ABNORMAL ML/MIN/{1.73_M2}
GLUCOSE BLD-MCNC: 191 MG/DL (ref 70–99)
GLUCOSE BLD-MCNC: 196 MG/DL (ref 65–105)
GLUCOSE BLD-MCNC: 199 MG/DL (ref 70–99)
GLUCOSE BLD-MCNC: 237 MG/DL (ref 70–99)
GLUCOSE BLD-MCNC: 243 MG/DL (ref 65–105)
GLUCOSE BLD-MCNC: 256 MG/DL (ref 65–105)
GLUCOSE BLD-MCNC: 276 MG/DL (ref 65–105)
GLUCOSE BLD-MCNC: 292 MG/DL (ref 65–105)
GLUCOSE BLD-MCNC: 304 MG/DL (ref 65–105)
GLUCOSE BLD-MCNC: 305 MG/DL (ref 65–105)
GLUCOSE BLD-MCNC: 305 MG/DL (ref 65–105)
GLUCOSE BLD-MCNC: 307 MG/DL (ref 65–105)
GLUCOSE BLD-MCNC: 328 MG/DL (ref 65–105)
GLUCOSE BLD-MCNC: 331 MG/DL (ref 65–105)
GLUCOSE BLD-MCNC: 360 MG/DL (ref 65–105)
GLUCOSE BLD-MCNC: 371 MG/DL (ref 65–105)
GLUCOSE BLD-MCNC: 373 MG/DL (ref 70–99)
GLUCOSE BLD-MCNC: 379 MG/DL (ref 70–99)
GLUCOSE BLD-MCNC: 490 MG/DL (ref 65–105)
GLUCOSE BLD-MCNC: 554 MG/DL (ref 65–105)
GLUCOSE BLD-MCNC: 708 MG/DL (ref 70–99)
GLUCOSE URINE: ABNORMAL
HCT VFR BLD CALC: 30.7 % (ref 36–46)
HCT VFR BLD CALC: 35.5 % (ref 36–46)
HCT VFR BLD CALC: 36.5 % (ref 36–46)
HCT VFR BLD CALC: 37.9 % (ref 36.3–47.1)
HCT VFR BLD CALC: 40.4 % (ref 36.3–47.1)
HDLC SERPL-MCNC: 65 MG/DL
HEMOGLOBIN: 10 G/DL (ref 12–16)
HEMOGLOBIN: 11.5 G/DL (ref 12–16)
HEMOGLOBIN: 11.5 G/DL (ref 12–16)
HEMOGLOBIN: 12 G/DL (ref 11.9–15.1)
HEMOGLOBIN: 12.5 G/DL (ref 11.9–15.1)
IMMATURE GRANULOCYTES: 0 %
IMMATURE GRANULOCYTES: 0 %
IMMATURE GRANULOCYTES: ABNORMAL %
IMMATURE GRANULOCYTES: ABNORMAL %
INR BLD: 1.3
KETONES, URINE: NEGATIVE
LACTIC ACID, SEPSIS WHOLE BLOOD: 10.1 MMOL/L (ref 0.5–1.9)
LACTIC ACID, SEPSIS: ABNORMAL MMOL/L (ref 0.5–1.9)
LDL CHOLESTEROL: 75 MG/DL (ref 0–130)
LEUKOCYTE ESTERASE, URINE: NEGATIVE
LYMPHOCYTES # BLD: 13 % (ref 24–44)
LYMPHOCYTES # BLD: 25 % (ref 24–43)
LYMPHOCYTES # BLD: 27 % (ref 24–44)
LYMPHOCYTES # BLD: 31 % (ref 24–43)
MAGNESIUM: 1.8 MG/DL (ref 1.6–2.6)
MAGNESIUM: 2 MG/DL (ref 1.6–2.6)
MAGNESIUM: 2.1 MG/DL (ref 1.6–2.6)
MAGNESIUM: 2.1 MG/DL (ref 1.6–2.6)
MCH RBC QN AUTO: 30.2 PG (ref 25.2–33.5)
MCH RBC QN AUTO: 30.2 PG (ref 25.2–33.5)
MCH RBC QN AUTO: 30.9 PG (ref 26–34)
MCH RBC QN AUTO: 31 PG (ref 26–34)
MCH RBC QN AUTO: 31.3 PG (ref 26–34)
MCHC RBC AUTO-ENTMCNC: 30.9 G/DL (ref 28.4–34.8)
MCHC RBC AUTO-ENTMCNC: 31.5 G/DL (ref 31–37)
MCHC RBC AUTO-ENTMCNC: 31.7 G/DL (ref 28.4–34.8)
MCHC RBC AUTO-ENTMCNC: 32.4 G/DL (ref 31–37)
MCHC RBC AUTO-ENTMCNC: 32.6 G/DL (ref 31–37)
MCV RBC AUTO: 95.1 FL (ref 80–100)
MCV RBC AUTO: 95.5 FL (ref 82.6–102.9)
MCV RBC AUTO: 96.6 FL (ref 80–100)
MCV RBC AUTO: 97.6 FL (ref 82.6–102.9)
MCV RBC AUTO: 98.1 FL (ref 80–100)
MICROALBUMIN/CREAT 24H UR: 231 MG/L
MICROALBUMIN/CREAT 24H UR: 808 MG/L
MICROALBUMIN/CREAT UR-RTO: 557 MCG/MG CREAT
MICROALBUMIN/CREAT UR-RTO: 882 MCG/MG CREAT
MONOCYTES # BLD: 12 % (ref 1–7)
MONOCYTES # BLD: 3 % (ref 1–7)
MONOCYTES # BLD: 7 % (ref 3–12)
MONOCYTES # BLD: 8 % (ref 3–12)
MUCUS: NORMAL
NITRITE, URINE: NEGATIVE
NRBC AUTOMATED: 0 PER 100 WBC
NRBC AUTOMATED: 0 PER 100 WBC
NRBC AUTOMATED: ABNORMAL PER 100 WBC
OTHER OBSERVATIONS UA: NORMAL
PARTIAL THROMBOPLASTIN TIME: 109.2 SEC (ref 24–36)
PARTIAL THROMBOPLASTIN TIME: 31.7 SEC (ref 24–36)
PARTIAL THROMBOPLASTIN TIME: 32.4 SEC (ref 24–36)
PARTIAL THROMBOPLASTIN TIME: 35.6 SEC (ref 24–36)
PARTIAL THROMBOPLASTIN TIME: 55.5 SEC (ref 24–36)
PARTIAL THROMBOPLASTIN TIME: >150 SEC (ref 24–36)
PDW BLD-RTO: 14.4 % (ref 11.8–14.4)
PDW BLD-RTO: 14.9 % (ref 11.8–14.4)
PDW BLD-RTO: 15.6 % (ref 11.5–14.9)
PDW BLD-RTO: 15.8 % (ref 11.5–14.9)
PDW BLD-RTO: 15.9 % (ref 11.5–14.9)
PH UA: 6 (ref 5–8)
PHOSPHORUS: 2.5 MG/DL (ref 2.6–4.5)
PHOSPHORUS: 2.8 MG/DL (ref 2.6–4.5)
PHOSPHORUS: 4.1 MG/DL (ref 2.6–4.5)
PLATELET # BLD: 162 K/UL (ref 150–450)
PLATELET # BLD: 184 K/UL (ref 150–450)
PLATELET # BLD: 186 K/UL (ref 150–450)
PLATELET # BLD: 205 K/UL (ref 138–453)
PLATELET # BLD: 279 K/UL (ref 138–453)
PLATELET ESTIMATE: ABNORMAL
PLATELET ESTIMATE: NORMAL
PMV BLD AUTO: 10.4 FL (ref 8.1–13.5)
PMV BLD AUTO: 10.5 FL (ref 8.1–13.5)
PMV BLD AUTO: 8.3 FL (ref 6–12)
PMV BLD AUTO: 8.6 FL (ref 6–12)
PMV BLD AUTO: 9.5 FL (ref 6–12)
POTASSIUM SERPL-SCNC: 3.7 MMOL/L (ref 3.7–5.3)
POTASSIUM SERPL-SCNC: 4 MMOL/L (ref 3.7–5.3)
POTASSIUM SERPL-SCNC: 4 MMOL/L (ref 3.7–5.3)
POTASSIUM SERPL-SCNC: 4.1 MMOL/L (ref 3.7–5.3)
POTASSIUM SERPL-SCNC: 4.5 MMOL/L (ref 3.7–5.3)
POTASSIUM SERPL-SCNC: 4.5 MMOL/L (ref 3.7–5.3)
PRO-BNP: 1260 PG/ML
PRO-BNP: 5067 PG/ML
PROTEIN UA: ABNORMAL
PROTHROMBIN TIME: 15.7 SEC (ref 11.8–14.6)
RBC # BLD: 3.23 M/UL (ref 4–5.2)
RBC # BLD: 3.68 M/UL (ref 4–5.2)
RBC # BLD: 3.72 M/UL (ref 4–5.2)
RBC # BLD: 3.97 M/UL (ref 3.95–5.11)
RBC # BLD: 4.14 M/UL (ref 3.95–5.11)
RBC # BLD: ABNORMAL 10*6/UL
RBC # BLD: NORMAL 10*6/UL
RBC UA: NORMAL /HPF
RENAL EPITHELIAL, UA: NORMAL /HPF
SALICYLATE LEVEL: <1 MG/DL (ref 3–10)
SEG NEUTROPHILS: 59 % (ref 36–65)
SEG NEUTROPHILS: 60 % (ref 36–66)
SEG NEUTROPHILS: 66 % (ref 36–65)
SEG NEUTROPHILS: 84 % (ref 36–66)
SEGMENTED NEUTROPHILS ABSOLUTE COUNT: 2.9 K/UL (ref 1.3–9.1)
SEGMENTED NEUTROPHILS ABSOLUTE COUNT: 3.92 K/UL (ref 1.5–8.1)
SEGMENTED NEUTROPHILS ABSOLUTE COUNT: 3.99 K/UL (ref 1.5–8.1)
SEGMENTED NEUTROPHILS ABSOLUTE COUNT: 4.5 K/UL (ref 1.3–9.1)
SODIUM BLD-SCNC: 130 MMOL/L (ref 135–144)
SODIUM BLD-SCNC: 134 MMOL/L (ref 135–144)
SODIUM BLD-SCNC: 135 MMOL/L (ref 135–144)
SODIUM BLD-SCNC: 135 MMOL/L (ref 135–144)
SODIUM BLD-SCNC: 136 MMOL/L (ref 135–144)
SODIUM BLD-SCNC: 136 MMOL/L (ref 135–144)
SODIUM BLD-SCNC: 137 MMOL/L (ref 135–144)
SODIUM BLD-SCNC: 138 MMOL/L (ref 135–144)
SPECIFIC GRAVITY UA: 1.02 (ref 1–1.03)
T3 FREE: 1.97 PG/ML (ref 2.02–4.43)
THYROXINE, FREE: 1.33 NG/DL (ref 0.93–1.7)
TOTAL CK: 32 U/L (ref 26–192)
TOTAL PROTEIN: 6.7 G/DL (ref 6.4–8.3)
TOXIC TRICYCLIC SC,BLOOD: NEGATIVE
TRICHOMONAS: NORMAL
TRIGL SERPL-MCNC: 264 MG/DL
TROPONIN INTERP: ABNORMAL
TROPONIN T: ABNORMAL NG/ML
TROPONIN, HIGH SENSITIVITY: 21 NG/L (ref 0–14)
TROPONIN, HIGH SENSITIVITY: 21 NG/L (ref 0–14)
TROPONIN, HIGH SENSITIVITY: 331 NG/L (ref 0–14)
TSH SERPL DL<=0.05 MIU/L-ACNC: 5.76 MIU/L (ref 0.3–5)
TURBIDITY: ABNORMAL
URINE HGB: ABNORMAL
UROBILINOGEN, URINE: NORMAL
VITAMIN B-12: 483 PG/ML (ref 232–1245)
VITAMIN D 25-HYDROXY: 24.9 NG/ML (ref 30–100)
VITAMIN D 25-HYDROXY: 31.7 NG/ML (ref 30–100)
VLDLC SERPL CALC-MCNC: ABNORMAL MG/DL (ref 1–30)
WBC # BLD: 4.8 K/UL (ref 3.5–11)
WBC # BLD: 5.3 K/UL (ref 3.5–11)
WBC # BLD: 5.8 K/UL (ref 3.5–11)
WBC # BLD: 5.9 K/UL (ref 3.5–11.3)
WBC # BLD: 6.8 K/UL (ref 3.5–11.3)
WBC # BLD: ABNORMAL 10*3/UL
WBC # BLD: NORMAL 10*3/UL
WBC UA: NORMAL /HPF
YEAST: NORMAL

## 2020-01-01 PROCEDURE — 71260 CT THORAX DX C+: CPT

## 2020-01-01 PROCEDURE — 80048 BASIC METABOLIC PNL TOTAL CA: CPT

## 2020-01-01 PROCEDURE — 93971 EXTREMITY STUDY: CPT

## 2020-01-01 PROCEDURE — 6370000000 HC RX 637 (ALT 250 FOR IP): Performed by: FAMILY MEDICINE

## 2020-01-01 PROCEDURE — 85730 THROMBOPLASTIN TIME PARTIAL: CPT

## 2020-01-01 PROCEDURE — 2500000003 HC RX 250 WO HCPCS

## 2020-01-01 PROCEDURE — G8399 PT W/DXA RESULTS DOCUMENT: HCPCS | Performed by: PODIATRIST

## 2020-01-01 PROCEDURE — 2060000000 HC ICU INTERMEDIATE R&B

## 2020-01-01 PROCEDURE — 96365 THER/PROPH/DIAG IV INF INIT: CPT

## 2020-01-01 PROCEDURE — 81001 URINALYSIS AUTO W/SCOPE: CPT

## 2020-01-01 PROCEDURE — 99999 PR OFFICE/OUTPT VISIT,PROCEDURE ONLY: CPT | Performed by: PODIATRIST

## 2020-01-01 PROCEDURE — 1123F ACP DISCUSS/DSCN MKR DOCD: CPT | Performed by: PODIATRIST

## 2020-01-01 PROCEDURE — 99232 SBSQ HOSP IP/OBS MODERATE 35: CPT | Performed by: FAMILY MEDICINE

## 2020-01-01 PROCEDURE — 7100000000 HC PACU RECOVERY - FIRST 15 MIN

## 2020-01-01 PROCEDURE — G8417 CALC BMI ABV UP PARAM F/U: HCPCS | Performed by: PODIATRIST

## 2020-01-01 PROCEDURE — 6360000002 HC RX W HCPCS: Performed by: INTERNAL MEDICINE

## 2020-01-01 PROCEDURE — 93010 ELECTROCARDIOGRAM REPORT: CPT | Performed by: INTERNAL MEDICINE

## 2020-01-01 PROCEDURE — 93798 PHYS/QHP OP CAR RHAB W/ECG: CPT

## 2020-01-01 PROCEDURE — 85520 HEPARIN ASSAY: CPT

## 2020-01-01 PROCEDURE — 94761 N-INVAS EAR/PLS OXIMETRY MLT: CPT

## 2020-01-01 PROCEDURE — 6360000002 HC RX W HCPCS

## 2020-01-01 PROCEDURE — 71046 X-RAY EXAM CHEST 2 VIEWS: CPT

## 2020-01-01 PROCEDURE — 36415 COLL VENOUS BLD VENIPUNCTURE: CPT

## 2020-01-01 PROCEDURE — 2580000003 HC RX 258: Performed by: FAMILY MEDICINE

## 2020-01-01 PROCEDURE — 2580000003 HC RX 258: Performed by: INTERNAL MEDICINE

## 2020-01-01 PROCEDURE — C1769 GUIDE WIRE: HCPCS

## 2020-01-01 PROCEDURE — 84484 ASSAY OF TROPONIN QUANT: CPT

## 2020-01-01 PROCEDURE — C1894 INTRO/SHEATH, NON-LASER: HCPCS

## 2020-01-01 PROCEDURE — 82947 ASSAY GLUCOSE BLOOD QUANT: CPT

## 2020-01-01 PROCEDURE — 93970 EXTREMITY STUDY: CPT

## 2020-01-01 PROCEDURE — 99285 EMERGENCY DEPT VISIT HI MDM: CPT

## 2020-01-01 PROCEDURE — 96372 THER/PROPH/DIAG INJ SC/IM: CPT

## 2020-01-01 PROCEDURE — 85379 FIBRIN DEGRADATION QUANT: CPT

## 2020-01-01 PROCEDURE — 73502 X-RAY EXAM HIP UNI 2-3 VIEWS: CPT

## 2020-01-01 PROCEDURE — 6360000002 HC RX W HCPCS: Performed by: EMERGENCY MEDICINE

## 2020-01-01 PROCEDURE — 99223 1ST HOSP IP/OBS HIGH 75: CPT | Performed by: FAMILY MEDICINE

## 2020-01-01 PROCEDURE — 6370000000 HC RX 637 (ALT 250 FOR IP)

## 2020-01-01 PROCEDURE — G8427 DOCREV CUR MEDS BY ELIG CLIN: HCPCS | Performed by: PODIATRIST

## 2020-01-01 PROCEDURE — 6370000000 HC RX 637 (ALT 250 FOR IP): Performed by: INTERNAL MEDICINE

## 2020-01-01 PROCEDURE — 83735 ASSAY OF MAGNESIUM: CPT

## 2020-01-01 PROCEDURE — 2700000000 HC OXYGEN THERAPY PER DAY

## 2020-01-01 PROCEDURE — 83880 ASSAY OF NATRIURETIC PEPTIDE: CPT

## 2020-01-01 PROCEDURE — 17110 DESTRUCTION B9 LES UP TO 14: CPT | Performed by: PODIATRIST

## 2020-01-01 PROCEDURE — G0480 DRUG TEST DEF 1-7 CLASSES: HCPCS

## 2020-01-01 PROCEDURE — 93005 ELECTROCARDIOGRAM TRACING: CPT | Performed by: STUDENT IN AN ORGANIZED HEALTH CARE EDUCATION/TRAINING PROGRAM

## 2020-01-01 PROCEDURE — 31500 INSERT EMERGENCY AIRWAY: CPT

## 2020-01-01 PROCEDURE — 93005 ELECTROCARDIOGRAM TRACING: CPT | Performed by: EMERGENCY MEDICINE

## 2020-01-01 PROCEDURE — 11721 DEBRIDE NAIL 6 OR MORE: CPT | Performed by: PODIATRIST

## 2020-01-01 PROCEDURE — 1090F PRES/ABSN URINE INCON ASSESS: CPT | Performed by: PODIATRIST

## 2020-01-01 PROCEDURE — 99283 EMERGENCY DEPT VISIT LOW MDM: CPT

## 2020-01-01 PROCEDURE — 94770 HC ETCO2 MONITOR DAILY: CPT

## 2020-01-01 PROCEDURE — 77080 DXA BONE DENSITY AXIAL: CPT

## 2020-01-01 PROCEDURE — 2580000003 HC RX 258: Performed by: EMERGENCY MEDICINE

## 2020-01-01 PROCEDURE — 85025 COMPLETE CBC W/AUTO DIFF WBC: CPT

## 2020-01-01 PROCEDURE — 85610 PROTHROMBIN TIME: CPT

## 2020-01-01 PROCEDURE — 1036F TOBACCO NON-USER: CPT | Performed by: PODIATRIST

## 2020-01-01 PROCEDURE — 99213 OFFICE O/P EST LOW 20 MIN: CPT | Performed by: PODIATRIST

## 2020-01-01 PROCEDURE — 83605 ASSAY OF LACTIC ACID: CPT

## 2020-01-01 PROCEDURE — 80307 DRUG TEST PRSMV CHEM ANLYZR: CPT

## 2020-01-01 PROCEDURE — 6370000000 HC RX 637 (ALT 250 FOR IP): Performed by: EMERGENCY MEDICINE

## 2020-01-01 PROCEDURE — 2709999900 HC NON-CHARGEABLE SUPPLY

## 2020-01-01 PROCEDURE — 92950 HEART/LUNG RESUSCITATION CPR: CPT

## 2020-01-01 PROCEDURE — C1760 CLOSURE DEV, VASC: HCPCS

## 2020-01-01 PROCEDURE — 93458 L HRT ARTERY/VENTRICLE ANGIO: CPT | Performed by: INTERNAL MEDICINE

## 2020-01-01 PROCEDURE — 2500000003 HC RX 250 WO HCPCS: Performed by: EMERGENCY MEDICINE

## 2020-01-01 PROCEDURE — 7100000001 HC PACU RECOVERY - ADDTL 15 MIN

## 2020-01-01 PROCEDURE — 99239 HOSP IP/OBS DSCHRG MGMT >30: CPT | Performed by: FAMILY MEDICINE

## 2020-01-01 PROCEDURE — 85027 COMPLETE CBC AUTOMATED: CPT

## 2020-01-01 PROCEDURE — 1111F DSCHRG MED/CURRENT MED MERGE: CPT

## 2020-01-01 PROCEDURE — 96375 TX/PRO/DX INJ NEW DRUG ADDON: CPT

## 2020-01-01 PROCEDURE — 4040F PNEUMOC VAC/ADMIN/RCVD: CPT | Performed by: PODIATRIST

## 2020-01-01 PROCEDURE — 6360000004 HC RX CONTRAST MEDICATION: Performed by: EMERGENCY MEDICINE

## 2020-01-01 PROCEDURE — 71045 X-RAY EXAM CHEST 1 VIEW: CPT

## 2020-01-01 PROCEDURE — 6360000004 HC RX CONTRAST MEDICATION

## 2020-01-01 PROCEDURE — 80069 RENAL FUNCTION PANEL: CPT

## 2020-01-01 RX ORDER — LORAZEPAM 2 MG/ML
1 INJECTION INTRAMUSCULAR EVERY 6 HOURS PRN
Status: DISCONTINUED | OUTPATIENT
Start: 2020-01-01 | End: 2020-01-01 | Stop reason: HOSPADM

## 2020-01-01 RX ORDER — GABAPENTIN 100 MG/1
100 CAPSULE ORAL 2 TIMES DAILY
Status: DISCONTINUED | OUTPATIENT
Start: 2020-01-01 | End: 2020-01-01 | Stop reason: HOSPADM

## 2020-01-01 RX ORDER — EPINEPHRINE 1 MG/ML
INJECTION, SOLUTION, CONCENTRATE INTRAVENOUS DAILY PRN
Status: COMPLETED | OUTPATIENT
Start: 2020-01-01 | End: 2020-01-01

## 2020-01-01 RX ORDER — GABAPENTIN 100 MG/1
100 CAPSULE ORAL 2 TIMES DAILY
Status: DISCONTINUED | OUTPATIENT
Start: 2020-01-01 | End: 2020-01-01

## 2020-01-01 RX ORDER — ALLOPURINOL 300 MG/1
1 TABLET ORAL DAILY
Status: CANCELLED | OUTPATIENT
Start: 2020-01-01

## 2020-01-01 RX ORDER — M-VIT,TX,IRON,MINS/CALC/FOLIC 27MG-0.4MG
1 TABLET ORAL DAILY
Status: CANCELLED | OUTPATIENT
Start: 2020-01-01

## 2020-01-01 RX ORDER — PREDNISONE 10 MG/1
10 TABLET ORAL DAILY
Status: DISCONTINUED | OUTPATIENT
Start: 2020-01-01 | End: 2020-01-01 | Stop reason: HOSPADM

## 2020-01-01 RX ORDER — SODIUM CHLORIDE 0.9 % (FLUSH) 0.9 %
10 SYRINGE (ML) INJECTION ONCE
Status: COMPLETED | OUTPATIENT
Start: 2020-01-01 | End: 2020-01-01

## 2020-01-01 RX ORDER — ISOSORBIDE MONONITRATE 30 MG/1
30 TABLET, EXTENDED RELEASE ORAL DAILY
Status: CANCELLED | OUTPATIENT
Start: 2020-01-01

## 2020-01-01 RX ORDER — ASPIRIN 81 MG/1
324 TABLET, CHEWABLE ORAL ONCE
Status: COMPLETED | OUTPATIENT
Start: 2020-01-01 | End: 2020-01-01

## 2020-01-01 RX ORDER — MORPHINE SULFATE 4 MG/ML
INJECTION, SOLUTION INTRAMUSCULAR; INTRAVENOUS
Status: DISCONTINUED
Start: 2020-01-01 | End: 2020-01-01 | Stop reason: WASHOUT

## 2020-01-01 RX ORDER — ISOSORBIDE MONONITRATE 30 MG/1
30 TABLET, EXTENDED RELEASE ORAL DAILY
Status: DISCONTINUED | OUTPATIENT
Start: 2020-01-01 | End: 2020-01-01

## 2020-01-01 RX ORDER — SODIUM CHLORIDE 0.9 % (FLUSH) 0.9 %
10 SYRINGE (ML) INJECTION EVERY 12 HOURS SCHEDULED
Status: DISCONTINUED | OUTPATIENT
Start: 2020-01-01 | End: 2020-01-01 | Stop reason: HOSPADM

## 2020-01-01 RX ORDER — VIT A/VIT C/VIT E/ZINC/COPPER 4296-226
1 CAPSULE ORAL NIGHTLY
Status: DISCONTINUED | OUTPATIENT
Start: 2020-01-01 | End: 2020-01-01 | Stop reason: CLARIF

## 2020-01-01 RX ORDER — ONDANSETRON 4 MG/1
4 TABLET, ORALLY DISINTEGRATING ORAL EVERY 8 HOURS PRN
Status: CANCELLED | OUTPATIENT
Start: 2020-01-01

## 2020-01-01 RX ORDER — ANTIARTHRITIC COMBINATION NO.2 900 MG
1 TABLET ORAL NIGHTLY
Status: CANCELLED | OUTPATIENT
Start: 2020-01-01

## 2020-01-01 RX ORDER — ETOMIDATE 2 MG/ML
INJECTION INTRAVENOUS DAILY PRN
Status: COMPLETED | OUTPATIENT
Start: 2020-01-01 | End: 2020-01-01

## 2020-01-01 RX ORDER — ACETYLCYSTEINE 200 MG/ML
600 SOLUTION ORAL; RESPIRATORY (INHALATION) 2 TIMES DAILY
Status: DISCONTINUED | OUTPATIENT
Start: 2020-01-01 | End: 2020-01-01 | Stop reason: HOSPADM

## 2020-01-01 RX ORDER — ALLOPURINOL 300 MG/1
300 TABLET ORAL DAILY
Status: DISCONTINUED | OUTPATIENT
Start: 2020-01-01 | End: 2020-01-01 | Stop reason: HOSPADM

## 2020-01-01 RX ORDER — ACYCLOVIR 200 MG/1
200 CAPSULE ORAL 2 TIMES DAILY
Status: DISCONTINUED | OUTPATIENT
Start: 2020-01-01 | End: 2020-01-01

## 2020-01-01 RX ORDER — SODIUM CHLORIDE 0.9 % (FLUSH) 0.9 %
10 SYRINGE (ML) INJECTION PRN
Status: DISCONTINUED | OUTPATIENT
Start: 2020-01-01 | End: 2020-01-01 | Stop reason: HOSPADM

## 2020-01-01 RX ORDER — 0.9 % SODIUM CHLORIDE 0.9 %
1000 INTRAVENOUS SOLUTION INTRAVENOUS ONCE
Status: COMPLETED | OUTPATIENT
Start: 2020-01-01 | End: 2020-01-01

## 2020-01-01 RX ORDER — ISOSORBIDE MONONITRATE 30 MG/1
30 TABLET, EXTENDED RELEASE ORAL DAILY
Status: DISCONTINUED | OUTPATIENT
Start: 2020-01-01 | End: 2020-01-01 | Stop reason: HOSPADM

## 2020-01-01 RX ORDER — MORPHINE SULFATE 4 MG/ML
4 INJECTION, SOLUTION INTRAMUSCULAR; INTRAVENOUS
Status: DISCONTINUED | OUTPATIENT
Start: 2020-01-01 | End: 2020-01-01 | Stop reason: HOSPADM

## 2020-01-01 RX ORDER — ACYCLOVIR 200 MG/1
200 CAPSULE ORAL 2 TIMES DAILY
Status: DISCONTINUED | OUTPATIENT
Start: 2020-01-01 | End: 2020-01-01 | Stop reason: HOSPADM

## 2020-01-01 RX ORDER — MORPHINE SULFATE 4 MG/ML
2 INJECTION, SOLUTION INTRAMUSCULAR; INTRAVENOUS
Status: DISCONTINUED | OUTPATIENT
Start: 2020-01-01 | End: 2020-01-01 | Stop reason: HOSPADM

## 2020-01-01 RX ORDER — NITROGLYCERIN 0.4 MG/1
0.4 TABLET SUBLINGUAL EVERY 5 MIN PRN
Status: DISCONTINUED | OUTPATIENT
Start: 2020-01-01 | End: 2020-01-01 | Stop reason: HOSPADM

## 2020-01-01 RX ORDER — M-VIT,TX,IRON,MINS/CALC/FOLIC 27MG-0.4MG
1 TABLET ORAL DAILY
Status: DISCONTINUED | OUTPATIENT
Start: 2020-01-01 | End: 2020-01-01 | Stop reason: HOSPADM

## 2020-01-01 RX ORDER — VITS A,C,E/LUTEIN/MINERALS 300MCG-200
1 TABLET ORAL NIGHTLY
Status: DISCONTINUED | OUTPATIENT
Start: 2020-01-01 | End: 2020-01-01 | Stop reason: HOSPADM

## 2020-01-01 RX ORDER — GLYCOPYRROLATE 0.2 MG/ML
0.2 INJECTION INTRAMUSCULAR; INTRAVENOUS EVERY 4 HOURS PRN
Status: DISCONTINUED | OUTPATIENT
Start: 2020-01-01 | End: 2020-01-01 | Stop reason: HOSPADM

## 2020-01-01 RX ORDER — ASPIRIN 81 MG/1
TABLET, CHEWABLE ORAL
Status: COMPLETED
Start: 2020-01-01 | End: 2020-01-01

## 2020-01-01 RX ORDER — INSULIN DEGLUDEC INJECTION 100 U/ML
INJECTION, SOLUTION SUBCUTANEOUS
COMMUNITY

## 2020-01-01 RX ORDER — ROSUVASTATIN CALCIUM 10 MG/1
20 TABLET, COATED ORAL DAILY
Status: DISCONTINUED | OUTPATIENT
Start: 2020-01-01 | End: 2020-01-01 | Stop reason: HOSPADM

## 2020-01-01 RX ORDER — ACYCLOVIR 200 MG/1
200 CAPSULE ORAL 2 TIMES DAILY
Status: CANCELLED | OUTPATIENT
Start: 2020-01-01

## 2020-01-01 RX ORDER — HEPARIN SODIUM 5000 [USP'U]/ML
4000 INJECTION, SOLUTION INTRAVENOUS; SUBCUTANEOUS PRN
Status: DISCONTINUED | OUTPATIENT
Start: 2020-01-01 | End: 2020-01-01 | Stop reason: ALTCHOICE

## 2020-01-01 RX ORDER — ANTIARTHRITIC COMBINATION NO.2 900 MG
1 TABLET ORAL NIGHTLY
Status: DISCONTINUED | OUTPATIENT
Start: 2020-01-01 | End: 2020-01-01 | Stop reason: RX

## 2020-01-01 RX ORDER — ALBUTEROL SULFATE 90 UG/1
2 AEROSOL, METERED RESPIRATORY (INHALATION) EVERY 6 HOURS PRN
Status: CANCELLED | OUTPATIENT
Start: 2020-01-01

## 2020-01-01 RX ORDER — ROSUVASTATIN CALCIUM 20 MG/1
20 TABLET, COATED ORAL DAILY
Status: CANCELLED | OUTPATIENT
Start: 2020-01-01

## 2020-01-01 RX ORDER — CLOPIDOGREL BISULFATE 75 MG/1
75 TABLET ORAL DAILY
Status: CANCELLED | OUTPATIENT
Start: 2020-01-01

## 2020-01-01 RX ORDER — TORSEMIDE 20 MG/1
20 TABLET ORAL 2 TIMES DAILY
Status: CANCELLED | OUTPATIENT
Start: 2020-01-01

## 2020-01-01 RX ORDER — ERGOCALCIFEROL 1.25 MG/1
50000 CAPSULE ORAL
Status: DISCONTINUED | OUTPATIENT
Start: 2020-01-01 | End: 2020-01-01 | Stop reason: HOSPADM

## 2020-01-01 RX ORDER — SODIUM CHLORIDE 0.9 % (FLUSH) 0.9 %
10 SYRINGE (ML) INJECTION PRN
Status: CANCELLED | OUTPATIENT
Start: 2020-01-01

## 2020-01-01 RX ORDER — TORSEMIDE 20 MG/1
20 TABLET ORAL 2 TIMES DAILY
Status: DISCONTINUED | OUTPATIENT
Start: 2020-01-01 | End: 2020-01-01

## 2020-01-01 RX ORDER — ALBUTEROL SULFATE 90 UG/1
2 AEROSOL, METERED RESPIRATORY (INHALATION) EVERY 6 HOURS PRN
Status: DISCONTINUED | OUTPATIENT
Start: 2020-01-01 | End: 2020-01-01 | Stop reason: HOSPADM

## 2020-01-01 RX ORDER — LEVOTHYROXINE SODIUM 0.07 MG/1
75 TABLET ORAL DAILY
Status: CANCELLED | OUTPATIENT
Start: 2020-01-01

## 2020-01-01 RX ORDER — LEVOTHYROXINE SODIUM 0.07 MG/1
75 TABLET ORAL DAILY
Status: DISCONTINUED | OUTPATIENT
Start: 2020-01-01 | End: 2020-01-01 | Stop reason: HOSPADM

## 2020-01-01 RX ORDER — NITROGLYCERIN 0.4 MG/1
0.4 TABLET SUBLINGUAL EVERY 5 MIN PRN
Status: CANCELLED | OUTPATIENT
Start: 2020-01-01

## 2020-01-01 RX ORDER — HEPARIN SODIUM 10000 [USP'U]/100ML
12 INJECTION, SOLUTION INTRAVENOUS CONTINUOUS
Status: DISCONTINUED | OUTPATIENT
Start: 2020-01-01 | End: 2020-01-01 | Stop reason: ALTCHOICE

## 2020-01-01 RX ORDER — TORSEMIDE 20 MG/1
20 TABLET ORAL DAILY
Status: DISCONTINUED | OUTPATIENT
Start: 2020-01-01 | End: 2020-01-01 | Stop reason: HOSPADM

## 2020-01-01 RX ORDER — VIT A/VIT C/VIT E/ZINC/COPPER 4296-226
1 CAPSULE ORAL NIGHTLY
Status: CANCELLED | OUTPATIENT
Start: 2020-01-01

## 2020-01-01 RX ORDER — ACETAMINOPHEN 325 MG/1
650 TABLET ORAL EVERY 4 HOURS PRN
Status: CANCELLED | OUTPATIENT
Start: 2020-01-01

## 2020-01-01 RX ORDER — ACYCLOVIR 200 MG/1
CAPSULE ORAL
COMMUNITY
Start: 2020-01-01

## 2020-01-01 RX ORDER — NICOTINE POLACRILEX 4 MG
15 LOZENGE BUCCAL PRN
Status: DISCONTINUED | OUTPATIENT
Start: 2020-01-01 | End: 2020-01-01 | Stop reason: HOSPADM

## 2020-01-01 RX ORDER — SODIUM CHLORIDE 9 MG/ML
INJECTION, SOLUTION INTRAVENOUS CONTINUOUS PRN
Status: COMPLETED | OUTPATIENT
Start: 2020-01-01 | End: 2020-01-01

## 2020-01-01 RX ORDER — BORTEZOMIB 3.5 MG/1
1 INJECTION, POWDER, LYOPHILIZED, FOR SOLUTION INTRAVENOUS; SUBCUTANEOUS
Status: DISCONTINUED | OUTPATIENT
Start: 2020-01-01 | End: 2020-01-01 | Stop reason: HOSPADM

## 2020-01-01 RX ORDER — CALCIUM CHLORIDE 100 MG/ML
INJECTION INTRAVENOUS; INTRAVENTRICULAR DAILY PRN
Status: COMPLETED | OUTPATIENT
Start: 2020-01-01 | End: 2020-01-01

## 2020-01-01 RX ORDER — EPINEPHRINE 0.1 MG/ML
SYRINGE (ML) INJECTION DAILY PRN
Status: COMPLETED | OUTPATIENT
Start: 2020-01-01 | End: 2020-01-01

## 2020-01-01 RX ORDER — ACETYLCYSTEINE 200 MG/ML
600 SOLUTION ORAL; RESPIRATORY (INHALATION) EVERY 6 HOURS
COMMUNITY
End: 2020-01-01

## 2020-01-01 RX ORDER — ONDANSETRON 4 MG/1
4 TABLET, FILM COATED ORAL EVERY 8 HOURS PRN
Status: DISCONTINUED | OUTPATIENT
Start: 2020-01-01 | End: 2020-01-01 | Stop reason: HOSPADM

## 2020-01-01 RX ORDER — SODIUM CHLORIDE 9 MG/ML
INJECTION, SOLUTION INTRAVENOUS CONTINUOUS
Status: DISCONTINUED | OUTPATIENT
Start: 2020-01-01 | End: 2020-01-01

## 2020-01-01 RX ORDER — CARVEDILOL 3.12 MG/1
3.12 TABLET ORAL 2 TIMES DAILY WITH MEALS
Status: DISCONTINUED | OUTPATIENT
Start: 2020-01-01 | End: 2020-01-01

## 2020-01-01 RX ORDER — ONDANSETRON 4 MG/1
4 TABLET, ORALLY DISINTEGRATING ORAL EVERY 8 HOURS PRN
Status: DISCONTINUED | OUTPATIENT
Start: 2020-01-01 | End: 2020-01-01 | Stop reason: HOSPADM

## 2020-01-01 RX ORDER — PANTOPRAZOLE SODIUM 20 MG/1
20 TABLET, DELAYED RELEASE ORAL DAILY
Status: DISCONTINUED | OUTPATIENT
Start: 2020-01-01 | End: 2020-01-01 | Stop reason: HOSPADM

## 2020-01-01 RX ORDER — CARVEDILOL 3.12 MG/1
3.12 TABLET ORAL 2 TIMES DAILY
Status: CANCELLED | OUTPATIENT
Start: 2020-01-01

## 2020-01-01 RX ORDER — TORSEMIDE 20 MG/1
20 TABLET ORAL 2 TIMES DAILY
Status: DISCONTINUED | OUTPATIENT
Start: 2020-01-01 | End: 2020-01-01 | Stop reason: SDUPTHER

## 2020-01-01 RX ORDER — PANTOPRAZOLE SODIUM 20 MG/1
20 TABLET, DELAYED RELEASE ORAL
Status: DISCONTINUED | OUTPATIENT
Start: 2020-01-01 | End: 2020-01-01

## 2020-01-01 RX ORDER — SUCCINYLCHOLINE CHLORIDE 20 MG/ML
INJECTION INTRAMUSCULAR; INTRAVENOUS DAILY PRN
Status: COMPLETED | OUTPATIENT
Start: 2020-01-01 | End: 2020-01-01

## 2020-01-01 RX ORDER — DEXTROSE MONOHYDRATE 25 G/50ML
12.5 INJECTION, SOLUTION INTRAVENOUS PRN
Status: DISCONTINUED | OUTPATIENT
Start: 2020-01-01 | End: 2020-01-01 | Stop reason: HOSPADM

## 2020-01-01 RX ORDER — DEXTROSE MONOHYDRATE 50 MG/ML
100 INJECTION, SOLUTION INTRAVENOUS PRN
Status: DISCONTINUED | OUTPATIENT
Start: 2020-01-01 | End: 2020-01-01 | Stop reason: HOSPADM

## 2020-01-01 RX ORDER — NOREPINEPHRINE BIT/0.9 % NACL 16MG/250ML
INFUSION BOTTLE (ML) INTRAVENOUS CONTINUOUS PRN
Status: COMPLETED | OUTPATIENT
Start: 2020-01-01 | End: 2020-01-01

## 2020-01-01 RX ORDER — SODIUM CHLORIDE 9 MG/ML
INJECTION, SOLUTION INTRAVENOUS CONTINUOUS
Status: DISCONTINUED | OUTPATIENT
Start: 2020-01-01 | End: 2020-01-01 | Stop reason: HOSPADM

## 2020-01-01 RX ORDER — HEPARIN SODIUM 5000 [USP'U]/ML
2000 INJECTION, SOLUTION INTRAVENOUS; SUBCUTANEOUS PRN
Status: DISCONTINUED | OUTPATIENT
Start: 2020-01-01 | End: 2020-01-01 | Stop reason: ALTCHOICE

## 2020-01-01 RX ORDER — CARVEDILOL 3.12 MG/1
3.12 TABLET ORAL 2 TIMES DAILY
Status: DISCONTINUED | OUTPATIENT
Start: 2020-01-01 | End: 2020-01-01 | Stop reason: HOSPADM

## 2020-01-01 RX ORDER — 0.9 % SODIUM CHLORIDE 0.9 %
80 INTRAVENOUS SOLUTION INTRAVENOUS ONCE
Status: COMPLETED | OUTPATIENT
Start: 2020-01-01 | End: 2020-01-01

## 2020-01-01 RX ORDER — M-VIT,TX,IRON,MINS/CALC/FOLIC 27MG-0.4MG
1 TABLET ORAL DAILY
Status: DISCONTINUED | OUTPATIENT
Start: 2020-01-01 | End: 2020-01-01

## 2020-01-01 RX ORDER — LEVOTHYROXINE SODIUM 0.07 MG/1
75 TABLET ORAL DAILY
Status: DISCONTINUED | OUTPATIENT
Start: 2020-01-01 | End: 2020-01-01

## 2020-01-01 RX ORDER — CLOPIDOGREL BISULFATE 75 MG/1
75 TABLET ORAL DAILY
Status: DISCONTINUED | OUTPATIENT
Start: 2020-01-01 | End: 2020-01-01

## 2020-01-01 RX ORDER — LANCETS 30 GAUGE
EACH MISCELLANEOUS
COMMUNITY
Start: 2020-01-01

## 2020-01-01 RX ORDER — PREDNISONE 20 MG/1
20 TABLET ORAL DAILY
Status: DISCONTINUED | OUTPATIENT
Start: 2020-01-01 | End: 2020-01-01 | Stop reason: HOSPADM

## 2020-01-01 RX ORDER — BORTEZOMIB 3.5 MG/1
2 INJECTION, POWDER, LYOPHILIZED, FOR SOLUTION INTRAVENOUS; SUBCUTANEOUS
Qty: 2 MG | Refills: 0
Start: 2020-01-01

## 2020-01-01 RX ORDER — PANTOPRAZOLE SODIUM 20 MG/1
1 TABLET, DELAYED RELEASE ORAL DAILY
Status: CANCELLED | OUTPATIENT
Start: 2020-01-01

## 2020-01-01 RX ORDER — FAMOTIDINE 20 MG/1
20 TABLET, FILM COATED ORAL DAILY
COMMUNITY
Start: 2020-01-01

## 2020-01-01 RX ORDER — MORPHINE SULFATE 4 MG/ML
INJECTION, SOLUTION INTRAMUSCULAR; INTRAVENOUS
Status: DISCONTINUED
Start: 2020-01-01 | End: 2020-01-01 | Stop reason: HOSPADM

## 2020-01-01 RX ORDER — ALLOPURINOL 300 MG/1
300 TABLET ORAL DAILY
Status: DISCONTINUED | OUTPATIENT
Start: 2020-01-01 | End: 2020-01-01

## 2020-01-01 RX ORDER — ACETYLCYSTEINE 200 MG/ML
600 SOLUTION ORAL; RESPIRATORY (INHALATION) EVERY 6 HOURS
Status: CANCELLED | OUTPATIENT
Start: 2020-01-01

## 2020-01-01 RX ORDER — SODIUM CHLORIDE 0.9 % (FLUSH) 0.9 %
10 SYRINGE (ML) INJECTION EVERY 12 HOURS SCHEDULED
Status: CANCELLED | OUTPATIENT
Start: 2020-01-01

## 2020-01-01 RX ORDER — CLOPIDOGREL BISULFATE 75 MG/1
75 TABLET ORAL DAILY
Status: DISCONTINUED | OUTPATIENT
Start: 2020-01-01 | End: 2020-01-01 | Stop reason: HOSPADM

## 2020-01-01 RX ADMIN — MOMETASONE FUROATE AND FORMOTEROL FUMARATE DIHYDRATE 2 PUFF: 100; 5 AEROSOL RESPIRATORY (INHALATION) at 08:59

## 2020-01-01 RX ADMIN — PREDNISONE 20 MG: 20 TABLET ORAL at 07:49

## 2020-01-01 RX ADMIN — GABAPENTIN 100 MG: 100 CAPSULE ORAL at 01:40

## 2020-01-01 RX ADMIN — ASPIRIN 324 MG: 81 TABLET, CHEWABLE ORAL at 20:14

## 2020-01-01 RX ADMIN — PANTOPRAZOLE SODIUM 20 MG: 20 TABLET, DELAYED RELEASE ORAL at 06:18

## 2020-01-01 RX ADMIN — GABAPENTIN 100 MG: 100 CAPSULE ORAL at 08:48

## 2020-01-01 RX ADMIN — ALLOPURINOL 300 MG: 300 TABLET ORAL at 08:49

## 2020-01-01 RX ADMIN — PREDNISONE 30 MG: 20 TABLET ORAL at 08:59

## 2020-01-01 RX ADMIN — EPINEPHRINE 1 MG: 0.1 INJECTION, SOLUTION ENDOTRACHEAL; INTRACARDIAC; INTRAVENOUS at 22:52

## 2020-01-01 RX ADMIN — CLOPIDOGREL BISULFATE 75 MG: 75 TABLET ORAL at 07:48

## 2020-01-01 RX ADMIN — PANTOPRAZOLE SODIUM 20 MG: 20 TABLET, DELAYED RELEASE ORAL at 08:37

## 2020-01-01 RX ADMIN — INSULIN LISPRO 6 UNITS: 100 INJECTION, SOLUTION INTRAVENOUS; SUBCUTANEOUS at 12:17

## 2020-01-01 RX ADMIN — INSULIN LISPRO 6 UNITS: 100 INJECTION, SOLUTION INTRAVENOUS; SUBCUTANEOUS at 08:50

## 2020-01-01 RX ADMIN — APIXABAN 5 MG: 5 TABLET, FILM COATED ORAL at 08:30

## 2020-01-01 RX ADMIN — MOMETASONE FUROATE AND FORMOTEROL FUMARATE DIHYDRATE 2 PUFF: 100; 5 AEROSOL RESPIRATORY (INHALATION) at 08:31

## 2020-01-01 RX ADMIN — LEVOTHYROXINE SODIUM 75 MCG: 75 TABLET ORAL at 07:48

## 2020-01-01 RX ADMIN — I-VITE, TAB 1000-60-2MG (60/BT) 1 TABLET: TAB at 21:01

## 2020-01-01 RX ADMIN — INSULIN LISPRO 8 UNITS: 100 INJECTION, SOLUTION INTRAVENOUS; SUBCUTANEOUS at 12:30

## 2020-01-01 RX ADMIN — INSULIN LISPRO 30 UNITS: 100 INJECTION, SUSPENSION SUBCUTANEOUS at 17:38

## 2020-01-01 RX ADMIN — EPINEPHRINE 20 MCG: 1 INJECTION, SOLUTION INTRAMUSCULAR; SUBCUTANEOUS at 22:12

## 2020-01-01 RX ADMIN — Medication 400 MG: at 21:01

## 2020-01-01 RX ADMIN — CLOPIDOGREL BISULFATE 75 MG: 75 TABLET ORAL at 08:29

## 2020-01-01 RX ADMIN — MOMETASONE FUROATE AND FORMOTEROL FUMARATE DIHYDRATE 2 PUFF: 100; 5 AEROSOL RESPIRATORY (INHALATION) at 08:49

## 2020-01-01 RX ADMIN — CARVEDILOL 3.12 MG: 3.12 TABLET, FILM COATED ORAL at 09:00

## 2020-01-01 RX ADMIN — MOMETASONE FUROATE AND FORMOTEROL FUMARATE DIHYDRATE 2 PUFF: 100; 5 AEROSOL RESPIRATORY (INHALATION) at 22:00

## 2020-01-01 RX ADMIN — LEVOTHYROXINE SODIUM 75 MCG: 75 TABLET ORAL at 09:07

## 2020-01-01 RX ADMIN — CARVEDILOL 3.12 MG: 3.12 TABLET, FILM COATED ORAL at 21:01

## 2020-01-01 RX ADMIN — TORSEMIDE 20 MG: 20 TABLET ORAL at 22:00

## 2020-01-01 RX ADMIN — Medication 10 ML: at 21:40

## 2020-01-01 RX ADMIN — ACYCLOVIR 200 MG: 200 CAPSULE ORAL at 07:54

## 2020-01-01 RX ADMIN — TORSEMIDE 20 MG: 20 TABLET ORAL at 01:40

## 2020-01-01 RX ADMIN — ACYCLOVIR 200 MG: 200 CAPSULE ORAL at 09:01

## 2020-01-01 RX ADMIN — Medication 50 MEQ: at 22:18

## 2020-01-01 RX ADMIN — GABAPENTIN 100 MG: 100 CAPSULE ORAL at 07:48

## 2020-01-01 RX ADMIN — ACYCLOVIR 200 MG: 200 CAPSULE ORAL at 22:00

## 2020-01-01 RX ADMIN — ALLOPURINOL 300 MG: 300 TABLET ORAL at 07:49

## 2020-01-01 RX ADMIN — ALLOPURINOL 300 MG: 300 TABLET ORAL at 09:00

## 2020-01-01 RX ADMIN — MULTIPLE VITAMINS W/ MINERALS TAB 1 TABLET: TAB at 08:30

## 2020-01-01 RX ADMIN — ASPIRIN 81 MG 324 MG: 81 TABLET ORAL at 20:14

## 2020-01-01 RX ADMIN — EPINEPHRINE 20 MCG: 1 INJECTION, SOLUTION INTRAMUSCULAR; SUBCUTANEOUS at 22:10

## 2020-01-01 RX ADMIN — PANTOPRAZOLE SODIUM 20 MG: 20 TABLET, DELAYED RELEASE ORAL at 08:53

## 2020-01-01 RX ADMIN — Medication 400 MG: at 08:59

## 2020-01-01 RX ADMIN — Medication 400 MG: at 08:49

## 2020-01-01 RX ADMIN — GABAPENTIN 100 MG: 100 CAPSULE ORAL at 08:30

## 2020-01-01 RX ADMIN — ALLOPURINOL 300 MG: 300 TABLET ORAL at 08:29

## 2020-01-01 RX ADMIN — Medication 10 ML: at 08:53

## 2020-01-01 RX ADMIN — ROSUVASTATIN CALCIUM 20 MG: 10 TABLET, FILM COATED ORAL at 12:42

## 2020-01-01 RX ADMIN — ACYCLOVIR 200 MG: 200 CAPSULE ORAL at 01:40

## 2020-01-01 RX ADMIN — PREDNISONE 30 MG: 20 TABLET ORAL at 08:49

## 2020-01-01 RX ADMIN — TORSEMIDE 20 MG: 20 TABLET ORAL at 08:59

## 2020-01-01 RX ADMIN — INSULIN LISPRO 8 UNITS: 100 INJECTION, SOLUTION INTRAVENOUS; SUBCUTANEOUS at 09:07

## 2020-01-01 RX ADMIN — ROSUVASTATIN CALCIUM 20 MG: 10 TABLET, FILM COATED ORAL at 21:45

## 2020-01-01 RX ADMIN — MOMETASONE FUROATE AND FORMOTEROL FUMARATE DIHYDRATE 2 PUFF: 100; 5 AEROSOL RESPIRATORY (INHALATION) at 21:46

## 2020-01-01 RX ADMIN — ISOSORBIDE MONONITRATE 30 MG: 30 TABLET, EXTENDED RELEASE ORAL at 08:30

## 2020-01-01 RX ADMIN — GABAPENTIN 100 MG: 100 CAPSULE ORAL at 21:46

## 2020-01-01 RX ADMIN — SUCCINYLCHOLINE CHLORIDE 100 MG: 20 INJECTION, SOLUTION INTRAMUSCULAR; INTRAVENOUS at 22:12

## 2020-01-01 RX ADMIN — CARVEDILOL 3.12 MG: 3.12 TABLET, FILM COATED ORAL at 08:49

## 2020-01-01 RX ADMIN — ACYCLOVIR 200 MG: 200 CAPSULE ORAL at 21:45

## 2020-01-01 RX ADMIN — Medication 400 MG: at 07:49

## 2020-01-01 RX ADMIN — CARVEDILOL 3.12 MG: 3.12 TABLET, FILM COATED ORAL at 08:30

## 2020-01-01 RX ADMIN — INSULIN LISPRO 8 UNITS: 100 INJECTION, SOLUTION INTRAVENOUS; SUBCUTANEOUS at 17:37

## 2020-01-01 RX ADMIN — Medication 5 MCG/MIN: at 23:00

## 2020-01-01 RX ADMIN — ISOSORBIDE MONONITRATE 30 MG: 30 TABLET, EXTENDED RELEASE ORAL at 07:49

## 2020-01-01 RX ADMIN — I-VITE, TAB 1000-60-2MG (60/BT) 1 TABLET: TAB at 22:06

## 2020-01-01 RX ADMIN — TORSEMIDE 20 MG: 20 TABLET ORAL at 13:15

## 2020-01-01 RX ADMIN — ACETYLCYSTEINE 600 MG: 200 SOLUTION ORAL; RESPIRATORY (INHALATION) at 08:38

## 2020-01-01 RX ADMIN — INSULIN LISPRO 10 UNITS: 100 INJECTION, SOLUTION INTRAVENOUS; SUBCUTANEOUS at 20:59

## 2020-01-01 RX ADMIN — SODIUM CHLORIDE 80 ML: 9 INJECTION, SOLUTION INTRAVENOUS at 21:40

## 2020-01-01 RX ADMIN — TORSEMIDE 20 MG: 20 TABLET ORAL at 08:52

## 2020-01-01 RX ADMIN — Medication 10 ML: at 07:51

## 2020-01-01 RX ADMIN — Medication 10 ML: at 21:03

## 2020-01-01 RX ADMIN — SODIUM CHLORIDE: 9 INJECTION, SOLUTION INTRAVENOUS at 12:43

## 2020-01-01 RX ADMIN — ACETYLCYSTEINE 600 MG: 200 SOLUTION ORAL; RESPIRATORY (INHALATION) at 10:26

## 2020-01-01 RX ADMIN — INSULIN LISPRO 30 UNITS: 100 INJECTION, SUSPENSION SUBCUTANEOUS at 08:39

## 2020-01-01 RX ADMIN — GABAPENTIN 100 MG: 100 CAPSULE ORAL at 22:00

## 2020-01-01 RX ADMIN — CARVEDILOL 3.12 MG: 3.12 TABLET, FILM COATED ORAL at 07:48

## 2020-01-01 RX ADMIN — IOPAMIDOL 75 ML: 755 INJECTION, SOLUTION INTRAVENOUS at 21:40

## 2020-01-01 RX ADMIN — EPINEPHRINE 1 MG: 0.1 INJECTION, SOLUTION ENDOTRACHEAL; INTRACARDIAC; INTRAVENOUS at 22:16

## 2020-01-01 RX ADMIN — CLOPIDOGREL BISULFATE 75 MG: 75 TABLET ORAL at 09:00

## 2020-01-01 RX ADMIN — LEVOTHYROXINE SODIUM 75 MCG: 75 TABLET ORAL at 06:42

## 2020-01-01 RX ADMIN — INSULIN LISPRO 30 UNITS: 100 INJECTION, SUSPENSION SUBCUTANEOUS at 17:36

## 2020-01-01 RX ADMIN — Medication 400 MG: at 01:40

## 2020-01-01 RX ADMIN — INSULIN LISPRO 5 UNITS: 100 INJECTION, SOLUTION INTRAVENOUS; SUBCUTANEOUS at 22:06

## 2020-01-01 RX ADMIN — Medication 400 MG: at 22:00

## 2020-01-01 RX ADMIN — MULTIPLE VITAMINS W/ MINERALS TAB 1 TABLET: TAB at 08:49

## 2020-01-01 RX ADMIN — INSULIN LISPRO 8 UNITS: 100 INJECTION, SOLUTION INTRAVENOUS; SUBCUTANEOUS at 17:36

## 2020-01-01 RX ADMIN — EPINEPHRINE 1 MG: 0.1 INJECTION, SOLUTION ENDOTRACHEAL; INTRACARDIAC; INTRAVENOUS at 21:58

## 2020-01-01 RX ADMIN — ACYCLOVIR 200 MG: 200 CAPSULE ORAL at 08:33

## 2020-01-01 RX ADMIN — LEVOTHYROXINE SODIUM 75 MCG: 75 TABLET ORAL at 06:18

## 2020-01-01 RX ADMIN — GABAPENTIN 100 MG: 100 CAPSULE ORAL at 21:01

## 2020-01-01 RX ADMIN — HEPARIN SODIUM AND DEXTROSE 12 UNITS/KG/HR: 10000; 5 INJECTION INTRAVENOUS at 09:46

## 2020-01-01 RX ADMIN — INSULIN LISPRO 30 UNITS: 100 INJECTION, SUSPENSION SUBCUTANEOUS at 09:07

## 2020-01-01 RX ADMIN — ETOMIDATE 30 MG: 2 INJECTION INTRAVENOUS at 22:11

## 2020-01-01 RX ADMIN — ACYCLOVIR 200 MG: 200 CAPSULE ORAL at 08:53

## 2020-01-01 RX ADMIN — ACYCLOVIR 200 MG: 200 CAPSULE ORAL at 21:01

## 2020-01-01 RX ADMIN — GABAPENTIN 100 MG: 100 CAPSULE ORAL at 09:00

## 2020-01-01 RX ADMIN — ISOSORBIDE MONONITRATE 30 MG: 30 TABLET, EXTENDED RELEASE ORAL at 09:00

## 2020-01-01 RX ADMIN — I-VITE, TAB 1000-60-2MG (60/BT) 1 TABLET: TAB at 21:45

## 2020-01-01 RX ADMIN — CLOPIDOGREL BISULFATE 75 MG: 75 TABLET ORAL at 08:49

## 2020-01-01 RX ADMIN — PANTOPRAZOLE SODIUM 20 MG: 20 TABLET, DELAYED RELEASE ORAL at 09:07

## 2020-01-01 RX ADMIN — MOMETASONE FUROATE AND FORMOTEROL FUMARATE DIHYDRATE 2 PUFF: 100; 5 AEROSOL RESPIRATORY (INHALATION) at 21:00

## 2020-01-01 RX ADMIN — INSULIN LISPRO 4 UNITS: 100 INJECTION, SOLUTION INTRAVENOUS; SUBCUTANEOUS at 21:46

## 2020-01-01 RX ADMIN — INSULIN LISPRO 15 UNITS: 100 INJECTION, SUSPENSION SUBCUTANEOUS at 10:26

## 2020-01-01 RX ADMIN — CALCIUM CHLORIDE 1 G: 100 INJECTION, SOLUTION INTRAVENOUS; INTRAVENTRICULAR at 22:18

## 2020-01-01 RX ADMIN — MULTIPLE VITAMINS W/ MINERALS TAB 1 TABLET: TAB at 07:48

## 2020-01-01 RX ADMIN — PANTOPRAZOLE SODIUM 20 MG: 20 TABLET, DELAYED RELEASE ORAL at 08:00

## 2020-01-01 RX ADMIN — Medication 400 MG: at 08:29

## 2020-01-01 RX ADMIN — TORSEMIDE 20 MG: 20 TABLET ORAL at 21:01

## 2020-01-01 RX ADMIN — INSULIN LISPRO 8 UNITS: 100 INJECTION, SOLUTION INTRAVENOUS; SUBCUTANEOUS at 12:41

## 2020-01-01 RX ADMIN — PREDNISONE 20 MG: 20 TABLET ORAL at 08:29

## 2020-01-01 RX ADMIN — INSULIN LISPRO 30 UNITS: 100 INJECTION, SUSPENSION SUBCUTANEOUS at 08:50

## 2020-01-01 RX ADMIN — SODIUM CHLORIDE: 9 INJECTION, SOLUTION INTRAVENOUS at 10:18

## 2020-01-01 RX ADMIN — ISOSORBIDE MONONITRATE 30 MG: 30 TABLET, EXTENDED RELEASE ORAL at 08:49

## 2020-01-01 RX ADMIN — Medication 400 MG: at 21:45

## 2020-01-01 RX ADMIN — INSULIN LISPRO 10 UNITS: 100 INJECTION, SOLUTION INTRAVENOUS; SUBCUTANEOUS at 22:55

## 2020-01-01 RX ADMIN — LEVOTHYROXINE SODIUM 75 MCG: 75 TABLET ORAL at 06:48

## 2020-01-01 RX ADMIN — CARVEDILOL 3.12 MG: 3.12 TABLET, FILM COATED ORAL at 01:40

## 2020-01-01 RX ADMIN — CARVEDILOL 3.12 MG: 3.12 TABLET, FILM COATED ORAL at 22:00

## 2020-01-01 RX ADMIN — SODIUM CHLORIDE 1000 ML: 9 INJECTION, SOLUTION INTRAVENOUS at 21:01

## 2020-01-01 RX ADMIN — APIXABAN 2.5 MG: 2.5 TABLET, FILM COATED ORAL at 01:40

## 2020-01-01 RX ADMIN — SODIUM CHLORIDE 1000 ML: 9 INJECTION, SOLUTION INTRAVENOUS at 23:14

## 2020-01-01 RX ADMIN — MOMETASONE FUROATE AND FORMOTEROL FUMARATE DIHYDRATE 2 PUFF: 100; 5 AEROSOL RESPIRATORY (INHALATION) at 07:49

## 2020-01-01 RX ADMIN — MULTIPLE VITAMINS W/ MINERALS TAB 1 TABLET: TAB at 09:00

## 2020-01-01 RX ADMIN — ROSUVASTATIN CALCIUM 20 MG: 10 TABLET, FILM COATED ORAL at 08:52

## 2020-01-01 RX ADMIN — Medication 10 ML: at 09:00

## 2020-01-01 RX ADMIN — INSULIN LISPRO 5 UNITS: 100 INJECTION, SOLUTION INTRAVENOUS; SUBCUTANEOUS at 21:04

## 2020-01-01 RX ADMIN — APIXABAN 2.5 MG: 2.5 TABLET, FILM COATED ORAL at 09:00

## 2020-01-01 RX ADMIN — INSULIN LISPRO 6 UNITS: 100 INJECTION, SOLUTION INTRAVENOUS; SUBCUTANEOUS at 08:38

## 2020-01-01 RX ADMIN — CARVEDILOL 3.12 MG: 3.12 TABLET, FILM COATED ORAL at 21:46

## 2020-01-01 ASSESSMENT — PAIN DESCRIPTION - PAIN TYPE
TYPE: ACUTE PAIN

## 2020-01-01 ASSESSMENT — ENCOUNTER SYMPTOMS
COUGH: 1
ABDOMINAL PAIN: 0
BACK PAIN: 0
DIARRHEA: 0
SHORTNESS OF BREATH: 1
VOMITING: 0

## 2020-01-01 ASSESSMENT — PULMONARY FUNCTION TESTS: PIF_VALUE: 34

## 2020-01-01 ASSESSMENT — PAIN DESCRIPTION - FREQUENCY
FREQUENCY: INTERMITTENT

## 2020-01-01 ASSESSMENT — PAIN SCALES - GENERAL
PAINLEVEL_OUTOF10: 0
PAINLEVEL_OUTOF10: 8
PAINLEVEL_OUTOF10: 0
PAINLEVEL_OUTOF10: 3
PAINLEVEL_OUTOF10: 9
PAINLEVEL_OUTOF10: 0

## 2020-01-01 ASSESSMENT — PAIN DESCRIPTION - ORIENTATION
ORIENTATION: LEFT
ORIENTATION: MID;LEFT
ORIENTATION: RIGHT
ORIENTATION: LEFT

## 2020-01-01 ASSESSMENT — PAIN DESCRIPTION - ONSET
ONSET: ON-GOING
ONSET: ON-GOING

## 2020-01-01 ASSESSMENT — PAIN DESCRIPTION - PROGRESSION
CLINICAL_PROGRESSION: NOT CHANGED
CLINICAL_PROGRESSION: NOT CHANGED

## 2020-01-01 ASSESSMENT — PAIN DESCRIPTION - LOCATION
LOCATION: CHEST
LOCATION: CHEST
LOCATION: HIP
LOCATION: CHEST

## 2020-01-01 ASSESSMENT — PAIN DESCRIPTION - DESCRIPTORS
DESCRIPTORS: TIGHTNESS;BURNING
DESCRIPTORS: TIGHTNESS
DESCRIPTORS: TIGHTNESS

## 2020-01-02 NOTE — ED PROVIDER NOTES
[address]  eMERGENCY dEPARTMENT eNCOUnter      Pt Name: Kay Rios  MRN: [id number]  [place] 1940  Date of evaluation: 1/2/2020  Provider: Luana Real PA-C    CHIEF COMPLAINT       Chief Complaint   Patient presents with    Hip Pain    Groin Pain           HISTORY OF PRESENT ILLNESS  (Location/Symptom, Timing/Onset, Context/Setting, Quality, Duration, Modifying Factors, Severity.)   Kay Rios is a [de-identified] y.o. female who presents to the emergency department with complaints of right hip pain and dysuria. Pt states her hip pain began this morning. States it feels deep into the joint and radiates into her groin. No pain at rest.  Pt reports pain with walking. Denies back pain, numbness. Additionally, pt thinks she has a UTI. Reports she has had difficulty urinating with some dysuria. Denies urgency, frequency, abd pain, nausea, emesis. No other complaints. Nursing Notes were reviewed. REVIEW OF SYSTEMS    (2-9 systems for level 4, 10 or more for level 5)     Review of Systems   Hip pain  Dysuria, difficulty urinating     Except as noted above the remainder of the review of systems was reviewed and negative. 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 of    Multiple myeloma ([facility] [address]     OA (osteoarthritis) 9/14/2011    Osteoarthritis     Osteopenia     Pneumonia     Post-menopausal     vaginal atrophy    Pulmonary nodules     Raynaud's disease     S/P cardiac cath     x4    Sleep apnea     no machine at night.     Thyroid disease     Hypothyroid    Type 2 diabetes mellitus 9/14/2011    Type [oxycodone-acetaminophen]; and Velcade [bortezomib]    FAMILY HISTORY           Problem Relation Age of Onset    Diabetes Mother     Hypertension Mother     Heart Disease Mother     Stroke Mother     Hypertension Father     Heart Disease Father     Stroke Father     Hypertension Sister     Rheum Arthritis Sister     Diabetes Brother     Hypertension Brother     Kidney Disease Brother     Breast Cancer Daughter     Cancer Daughter         breast    Hypertension Brother     Crohn's Disease Son     Rheum Arthritis Sister      Family Status   Relation Name Status    Mother     Ollie Albarado Father      Sister  (Not Specified)    Brother  (Not Specified)    Denzel      Brother  (Not Specified)   Ollie Albarado Son  (Not Specified)    Sister  (Not Specified)      None otherwise stated in nurses notes    SOCIAL HISTORY      reports that she has never smoked. She has never used smokeless tobacco. She reports previous alcohol use of about 1.0 standard drinks of alcohol per week. She reports that she does not use drugs. lives at home with others     PHYSICAL EXAM    (up to 7 for level 4, 8 or more for level 5)     ED Triage Vitals [20 1756]   BP Temp Temp Source Pulse Resp SpO2 Height Weight   (!) 157/71 98 °F (36.7 °C) Oral 57 18 99 % 5' 1\" (1.549 m) 217 lb (98.4 kg)       Physical Exam  Vitals signs reviewed. Constitutional:       Appearance: Normal appearance. HENT:      Head: Normocephalic and atraumatic. Cardiovascular:      Rate and Rhythm: Normal rate and regular rhythm. Pulses: Normal pulses. Dorsalis pedis pulses are 2+ on the left side. Posterior tibial pulses are 2+ on the left side. Heart sounds: Normal heart sounds, S1 normal and S2 normal. No murmur. No friction rub. No gallop. Pulmonary:      Effort: Pulmonary effort is normal. No respiratory distress. Breath sounds: Normal breath sounds. No stridor.  No decreased breath sounds, wheezing, rhonchi or rales. Abdominal:      Tenderness: There is no tenderness. Musculoskeletal:      Right hip: She exhibits tenderness and bony tenderness. She exhibits normal range of motion, normal strength, no swelling, no crepitus, no deformity and no laceration. Left hip: Normal.      Right knee: Normal.      Right ankle: Normal.      Cervical back: Normal.      Thoracic back: Normal.      Lumbar back: Normal.      Right foot: Normal.   Skin:     General: Skin is warm. Capillary Refill: Capillary refill takes less than 2 seconds. Findings: No rash. Neurological:      General: No focal deficit present. Mental Status: She is alert and oriented to person, place, and time. Mental status is at baseline. DIAGNOSTIC RESULTS     EKG: All EKG's are interpreted by the Emergency Department Physician who either signs or Co-signs this chart in the absence of a cardiologist.        RADIOLOGY:   All plain film, CT, MRI, and formal ultrasound images (except ED bedside ultrasound) are read by the radiologist, see reports below, unless otherwise noted in MDM or here. XR HIP RIGHT (2-3 VIEWS)   Final Result   No acute osseous abnormality of the right hip. No significant plain film   abnormality to explain the patient's pain. No results found. LABS:  Labs Reviewed   URINE RT REFLEX TO CULTURE - Abnormal; Notable for the following components:       Result Value    Glucose, Ur 2+ (*)     Urine Hgb TRACE (*)     Protein, UA 3+ (*)     All other components within normal limits   MICROSCOPIC URINALYSIS       All other labs were within normal range or not returned as of this dictation.     EMERGENCY DEPARTMENT COURSE and DIFFERENTIAL DIAGNOSIS/MDM:   Vitals:    Vitals:    01/02/20 1756 01/02/20 1830   BP: (!) 157/71    Pulse: 57 60   Resp: 18    Temp: 98 °F (36.7 °C)    TempSrc: Oral    SpO2: 99%    Weight: 217 lb (98.4 kg)    Height: 5' 1\" (1.549 m)          Patient instructed to return to the emergency room if symptoms worsen, return, or any other concern right away which is agreed by the patient    ED MEDS:  No orders of the defined types were placed in this encounter. CONSULTS:  None    PROCEDURES:  None      FINAL IMPRESSION      1. Acute pain of right hip    2. Urinary urgency          DISPOSITION/PLAN   DISPOSITION Decision To Discharge    PATIENT REFERRED TO:  PHILIP Minaya CNP  [address] [facility] [address] [id number]  [address] [facility] ED  [facility] [id number]  [place], [address]  [address]  [address] [facility] [address]  [phone]            DISCHARGE MEDICATIONS:  Discharge Medication List as of 1/2/2020  7:47 PM            Summation      Patient Course:    Right hip and groin pain. Began this morning. Occurs with walking. No known injury. Xray is unremarkable. UA shows no nitrites or leukocytes. 3+ protein and 2+ glucose. Pt is diabetic. Has not taken her medication today. Will dc home. Follow up with PCP and ortho. Discussed results and plan with the pt. They expressed appropriate understanding. Pt given close follow up, supportive care instructions and strict return instructions at the bedside. ED Medications administered this visit:  Medications - No data to display    New Prescriptions from this visit:    Discharge Medication List as of 1/2/2020  7:47 PM          Follow-up:  PHILIP Minaya CNP  [address] [facility] [address] [id number]  [address] [facility] [address]  [place], [address]  [address]  [address] [facility] [address] [facility]              Final Impression:   1. Acute pain of right hip    2.  Urinary urgency               (Please note that portions of this note were completed with a voice recognition program.  Efforts were made to edit the dictations but occasionally words are mis-transcribed.)      (Please note that portions of this note were completed with a voice recognition program.  Efforts were made to edit the dictations but occasionally words are mis-transcribed.)    Nani Alicia. Deepti [address], PA-C  01/02/20 [id number]

## 2020-01-02 NOTE — PROGRESS NOTES
Goal reviewed with pt - states she doesn't feel any stronger in her legs. Works at a low workload but is able to complete the 10 minutes. ITP  Updated.

## 2020-01-03 NOTE — CARE COORDINATION
Ambulatory Care Coordination ED Follow up Call       Reason for ED Visit:  Acute right hip pain   Care Management Risk Score: CMRS 15     Patient was called to follow up with most recent ER visit. There was no answer. A message was left on voicemail to have patient call back regarding ER visit. Office number given [phone].         FU appts/Provider:    Future Appointments   Date Time Provider Valerie Recinos   1/7/2020 10:00 AM STC CARD [facility] 07 [address]   1/9/2020 10:00 AM STC CARD REHAB RM 07 [facility]   1/14/2020 10:00 AM STC CARD REHAB  07 [facility] [facility]   1/16/2020 10:00 AM STC CARD REHAB RM 07 [facility] CARDI[facility]   1/21/2020 10:00 AM STC CARD REHAB  07 [facility]ARD[facility]   1/23/2020 10:00 AM STC CARD [facility] 07 [place] [facility] CARDIAC [facility]   1/27/2020 11:30 AM Teresa Heimlich, DPM [facility]   1/28/2020 10:00 AM STC CARD REHAB  07 [place] [facility] CARDIAC [facility]   1/30/2020 10:00 AM STC CARD REHAB  07 [facility] CARDIAC [facility]   2/4/2020 10:00 AM STC CARD REHAB  07 [facility]CZ CARDI[facility]   2/6/2020  9:00 AM STC CARD REHAB  07 [facility] CARDIAC [facility]   2/11/2020 10:00 AM STC CARD [facility] 07 STCZ CARDI[facility]   4/14/2020  1:00 PM Keely Darnell 21 Maintenance Due   Topic Date Due    Shingles Vaccine (1 of 2) 11/25/1989    Pneumococcal 65+ years Vaccine (1 of 1 - PPSV23) 11/25/2004    Annual Wellness Visit (AWV)  05/29/2019

## 2020-01-12 PROBLEM — E66.01 MORBID OBESITY WITH BMI OF 40.0-44.9, ADULT (HCC): Status: ACTIVE | Noted: 2020-01-01

## 2020-01-12 NOTE — CARE COORDINATION
DISCHARGE PLANNING NOTE:    Plan is for this patient to return to home and will resume service with [facility]. To have heart cath on Tuesday at [facility] - Needs to be off eliquis for 48 hours, start heparin gtt tomorrow. Nephro consulted for clearance. [place] [facility] - 40% - Needs appt scheduled. Will continue to follow along.      Electronically signed by Dorothy Celaya RN on 1/12/2020 at 4:02 PM

## 2020-01-12 NOTE — H&P
1/16/18    mid LAD stent    CHOLECYSTECTOMY  4/1988    COLONOSCOPY  5/5/09    CORONARY ANGIOPLASTY WITH STENT PLACEMENT  1/18/2016    stent X 1    CORONARY ANGIOPLASTY WITH STENT PLACEMENT  05/2019    lad x 2    KNEE ARTHROSCOPY Right 7438    UMBILICAL HERNIA REPAIR  1/820/2019    UPPER GASTROINTESTINAL ENDOSCOPY  5/5/09       Medications Prior to Admission:    Prior to Admission medications    Medication Sig Start Date End Date Taking? Authorizing Provider   predniSONE (DELTASONE) 10 MG tablet Sig: 3 tablets daily by mouth for 3 days, then 2 tablets daily for 3 days, then 1 tablet daily for 3 days 1/10/20  Yes PHILIP Coulter CNP   vitamin D (ERGOCALCIFEROL) 1.25 MG (27002 UT) CAPS capsule TAKE 1 CAPSULE BY MOUTH ONE TIME A WEEK  1/9/20  Yes Martha Yu MD   clopidogrel (PLAVIX) 75 MG tablet Take 1 tablet by mouth daily 12/4/19  Yes PHILIP Coulter CNP   isosorbide mononitrate (IMDUR) 30 MG extended release tablet Take 30 mg by mouth daily  10/29/19  Yes Historical Provider, MD   pantoprazole (PROTONIX) 20 MG tablet TAKE 1 TABLET BY MOUTH ONE TIME A DAY  10/29/19  Yes PHILIP Coulter CNP   gabapentin (NEURONTIN) 100 MG capsule Take 1 capsule by mouth 2 times daily.  10/3/19 12/11/20 Yes PHILIP Coulter CNP   torsemide (DEMADEX) 20 MG tablet Take 20 mg by mouth 2 times daily   Yes Historical Provider, MD   allopurinol (ZYLOPRIM) 300 MG tablet TAKE 1 TABLET BY MOUTH ONE TIME A DAY  9/9/19  Yes PHILIP Coulter CNP   albuterol sulfate HFA (PROVENTIL HFA) 108 (90 Base) MCG/ACT inhaler Inhale 2 puffs into the lungs every 6 hours as needed for Wheezing 8/13/19 12/1/22 Yes PHILIP Coulter CNP   Multiple Vitamins-Minerals (THERAPEUTIC MULTIVITAMIN-MINERALS) tablet Take 1 tablet by mouth daily 8/13/19 8/12/20 Yes PHILIP Coulter CNP   fluticasone-salmeterol (ADVAIR DISKUS) 100-50 MCG/DOSE diskus inhaler INHALE ONE PUFF BY MOUTH EVERY 12 HOURS 8/9/19  Yes Saadia Diaz MD bortezomib (VELCADE) 3.5 MG chemo innjection Infuse 1 mg/m2 intravenously every 14 days    Yes Historical Provider, MD   carvedilol (COREG) 3.125 MG tablet Take 3.125 mg by mouth 2 times daily   Yes Historical Provider, MD   Multiple Vitamins-Minerals (PRESERVISION AREDS) CAPS Take 1 capsule by mouth nightly   Yes Historical Provider, MD   rosuvastatin (CRESTOR) 20 MG tablet Take 20 mg by mouth daily   Yes Historical Provider, MD   apixaban (ELIQUIS) 2.5 MG TABS tablet Take 1 tablet by mouth 2 times daily 5/11/19  Yes Fox Mays MD   levothyroxine (SYNTHROID) 75 MCG tablet Take 75 mcg by mouth Daily   Yes Historical Provider, MD   insulin 70-30 (NOVOLIN 70/30) (70-30) 100 UNIT per ML injection vial Inject 30 Units into the skin 2 times daily    Yes Historical Provider, MD   magnesium oxide (MAG-OX) 400 (241.3 Mg) MG TABS tablet Take 1 tablet by mouth 2 times daily for 15 days 3/19/19 7/19/20 Yes Andreea Izaguirre MD   ondansetron (ZOFRAN ODT) 4 MG disintegrating tablet Take 1 tablet by mouth every 8 hours as needed for Nausea 10/27/18  Yes Adriana Mccann MD   insulin aspart (NOVOLOG) 100 UNIT/ML injection vial Inject 6 Units into the skin 2 times daily (before meals)    Yes Historical Provider, MD   acyclovir (ZOVIRAX) 200 MG capsule Take 200 mg by mouth 2 times daily  7/20/17  Yes Historical Provider, MD   nitroGLYCERIN (NITROSTAT) 0.4 MG SL tablet Place 1 tablet under the tongue every 5 minutes as needed for Chest pain 7/21/15  Yes Halle Messer, APRN - CNP   Biotin 5000 MCG TABS Take 1 tablet by mouth nightly    Yes Historical Provider, MD       Allergies:  Hepatitis b virus vaccine; Norvasc [amlodipine besylate]; Tramadol; Fentanyl; Percocet [oxycodone-acetaminophen]; and Velcade [bortezomib]    Social History:  The patient currently lives at home    TOBACCO:   reports that she has never smoked.  She has never used smokeless tobacco.  ETOH:   reports previous alcohol use of about 1.0 standard drinks of alcohol per week. Review of Systems - General ROS: negative  Psychological ROS: negative  Ophthalmic ROS: negative  ENT ROS: negative  Hematological and Lymphatic ROS: positive for - pallor  Endocrine ROS: positive for - hyperglycemia  Respiratory ROS: positive for - JAMES  Cardiovascular ROS: positive for - chest pain  Gastrointestinal ROS: positive for - heartburn  Musculoskeletal ROS: positive for - joint stiffness and muscle pain      Family History:          Problem Relation Age of Onset    Diabetes Mother     Hypertension Mother     Heart Disease Mother     Stroke Mother     Hypertension Father     Heart Disease Father     Stroke Father     Hypertension Sister     Rheum Arthritis Sister     Diabetes Brother     Hypertension Brother     Kidney Disease Brother     Breast Cancer Daughter     Cancer Daughter         breast    Hypertension Brother     Crohn's Disease Son     Rheum Arthritis Sister        PHYSICAL EXAM:    BP (!) 152/60   Pulse 75   Temp 98.4 °F (36.9 °C) (Oral)   Resp 18   Ht 5' 1\" (1.549 m)   Wt 218 lb 14.7 oz (99.3 kg)   SpO2 98%   BMI 41.36 kg/m²     General appearance: No apparent distress appears stated age and cooperative. HEENT Normal cephalic, atraumatic without obvious deformity. Pupils equal, round, and reactive to light. Extra ocular muscles intact. Conjunctivae/corneas clear. Neck: Supple, No jugular venous distention/bruits. Trachea midline without thyromegaly or adenopathy with full range of motion. Lungs: Clear to auscultation, bilaterally without Rales/Wheezes/Rhonchi with good respiratory effort. Heart: Regular rate and rhythm with Normal S1/S2 without murmurs, rubs or gallops, point of maximum impulse non-displaced  Abdomen: Soft, non-tender or non-distended without rigidity or guarding and positive bowel sounds all four quadrants. Extremities: No clubbing, cyanosis, or edema bilaterally.   Full range of motion without deformity and normal gait intact. Skin: Skin color, texture, turgor normal.  No rashes or lesions. Neurologic: Alert and oriented X 3, neurovascularly intact with sensory/motor intact upper extremities/lower extremities, bilaterally. Cranial nerves: II-XII intact, grossly non-focal.  Mental status: Alert, oriented, thought content appropriate. CXR:  I have reviewed the CXR with the following interpretation: no acute changes  EKG:  I have reviewed the EKG with the following interpretation: NSR    CBC   Recent Labs     01/11/20 2010 01/12/20  0446   WBC 5.3 4.8   HGB 11.5* 10.0*   HCT 36.5 30.7*    162      RENAL  Recent Labs     01/11/20 2010 01/12/20  0446   * 138   K 4.5 3.7   CL 89* 99   CO2 25 27   BUN 34* 32*   CREATININE 1.40* 1.09*     LFT'S  No results for input(s): AST, ALT, ALB, BILIDIR, BILITOT, ALKPHOS in the last 72 hours. COAG  No results for input(s): INR in the last 72 hours. CARDIAC ENZYMES  No results for input(s): CKTOTAL, CKMB, CKMBINDEX, TROPONINI in the last 72 hours.     U/A:    Lab Results   Component Value Date    NITRITE neg 08/16/2016    COLORU YELLOW 01/02/2020    WBCUA 0 TO 2 01/02/2020    RBCUA 0 TO 2 01/02/2020    MUCUS NOT REPORTED 01/02/2020    BACTERIA NOT REPORTED 01/02/2020    CLARITYU Clear 03/22/2019    SPECGRAV 1.020 01/02/2020    LEUKOCYTESUR NEGATIVE 01/02/2020    BLOODU Negative 03/22/2019    GLUCOSEU 2+ 01/02/2020    AMORPHOUS NOT REPORTED 01/02/2020       ABG  No results found for: POO2FMX, BEART, E7AKRBRU, PHART, THGBART, KAC7VVM, PO2ART, XXI2MUC        Active Hospital Problems    Diagnosis Date Noted    Morbid obesity with BMI of 40.0-44.9, adult ([facility] [address] [E66.01, Z68.41] 01/12/2020    Age-related nuclear cataract, bilateral [H25.13] 09/11/2019    Chest pain [R07.9] 05/09/2019    Anemia [D64.9] 06/05/2018    Essential hypertension [I10] 09/07/2017    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 ([facility] [address] [D31.6738] 09/07/2017    Cardiac amyloidosis ([facility] [E85.4, I43] 07/20/2017    Cardiomyopathy, nonischemic (HCC) [I42.8] 05/03/2017    Multiple myeloma ([facility] [C90.00] 03/31/2017    JAMES (obstructive sleep apnea) [G47.33] 03/31/2017    Chronic diastolic CHF (congestive heart failure) ([facility] [I50.32] 03/19/2017    Pure hypercholesterolemia [E78.00] 06/20/2016    Chronic gout without tophus [M1A.9XX0] 07/21/2015    Hypothyroidism [E03.9] 09/11/2012    Chronic cystitis [N30.20] 09/14/2011    Coronary artery disease involving native coronary artery of native heart without angina pectoris [I25.10] 09/14/2011    OA (osteoarthritis) [M19.90] 09/14/2011         ASSESSMENT/PLAN:  Patient admitted with chest pain. Co-morbidities as above.     Orders Placed This Encounter   Procedures    XR CHEST STANDARD (2 VW)    VL Lower Extremity Bilateral Venous Duplex    CT Chest Pulmonary Embolism W Contrast    CBC Auto Differential    Basic Metabolic Panel    Magnesium    Troponin    Brain Natriuretic Peptide    D-Dimer, Quantitative    CBC with DIFF    Basic Metabolic Panel w/ Reflex to MG    DIET CARDIAC;    Vital signs per unit routine    Notify physician    Notify physician    Up as tolerated    HYPOGLYCEMIA TREATMENT: blood glucose less than 50 mg/dL and patient  ALERT and TOLERATING PO    HYPOGLYCEMIA TREATMENT: blood glucose less than 70 mg/dL and patient ALERT and TOLERATING PO    HYPOGLYCEMIA TREATMENT: blood glucose less than 70 mg/dL and patient NOT ALERT or NPO    Telemetry monitoring    Full Code    [facility]    MDI Treatment    POC Glucose Fingerstick    POC Glucose Fingerstick    POCT glucose    POCT Glucose    POC Glucose Fingerstick    EKG 12 Lead    PATIENT STATUS (DIRECT) Inpatient    PATIENT STATUS (FROM ED OR OR/PROCEDURAL) Inpatient         DVT Prophylaxis: Eliquis  Diet: DIET CARDIAC;  Code Status: Full

## 2020-01-12 NOTE — CONSULTS
three times a week     Gets together: Once a week     Attends [employer] service: 1 to 4 times per year     Active member of club or organization: No     Attends meetings of clubs or organizations: Never     Relationship status:     Intimate partner violence:     Fear of current or ex partner: No     Emotionally abused: Not on file     Physically abused: Not on file     Forced sexual activity: No   Other Topics Concern    Not on file   Social History Narrative    Not on file       Family History:   Family History   Problem Relation Age of Onset    Diabetes Mother     Hypertension Mother     Heart Disease Mother     Stroke Mother     Hypertension Father     Heart Disease Father     Stroke Father     Hypertension Sister     Rheum Arthritis Sister     Diabetes Brother     Hypertension Brother     Kidney Disease Brother     Breast Cancer Daughter     Cancer Daughter         breast    Hypertension Brother     Crohn's Disease Son     Rheum Arthritis Sister        Review of Systems:    Constitutional: No fever, no chills, no night sweats, fatigue, generalized weakness, loss of appetite  HEENT:  No headache, otalgia, itchy eyes, epistaxis, nasal discharge or sore throat. Cardiac:  Complains of chest pain, dyspnea, orthopnea or PND, palpitations  Chest:  No cough, hemoptysis, pleuritic chest pain, wheezing,SOB  Abdomen:  No abdominal pain, nausea, vomiting, diarrhea, melena, dysphagia hematemesis,constipation, abdominal bloating, flank pain  Neuro:  No CVA, TIA or seizure like activity. Skin:   No rashes, no itching. :   No hematuria, no pyuria, no dysuria, no flank pain. Extremities:  No swelling or joint pains.       Objective:  CURRENT TEMPERATURE:  Temp: 98.1 °F (36.7 °C)  MAXIMUM TEMPERATURE OVER 24HRS:  Temp (24hrs), Av.1 °F (36.7 °C), Min:97.8 °F (36.6 °C), Max:98.4 °F (36.9 °C)    CURRENT RESPIRATORY RATE:  Resp: 16  CURRENT PULSE:  Pulse: 65  CURRENT BLOOD PRESSURE:  BP: (!) 140/78  24HR BLOOD PRESSURE RANGE:  Systolic (66GSQ), IPE:403 , Min:131 , QYW:502   ; Diastolic (67AFG), FVV:15, Min:46, Max:85    24HR INTAKE/OUTPUT:      Intake/Output Summary (Last 24 hours) at 1/12/2020 1553  Last data filed at 1/12/2020 1232  Gross per 24 hour   Intake 1410 ml   Output 650 ml   Net 760 ml     Patient Vitals for the past 96 hrs (Last 3 readings):   Weight   01/12/20 0413 218 lb 14.7 oz (99.3 kg)   01/11/20 2342 218 lb (98.9 kg)   01/11/20 1950 218 lb (98.9 kg)       Physical Exam:  GENERAL APPEARANCE: Alert and cooperative, and appears to be in no acute distress. HEAD: normocephalic  EYES: PERRL, EOMI. Not pale, anicteric   NOSE:  No nasal discharge. THROAT:  Oral cavity and pharynx normal. Moist  CARDIAC: Normal S1 and S2. No S3, S4 or murmurs. Rhythm is regular. LUNGS: Clear to auscultation and percussion without rales, rhonchi, wheezing or diminished breath sounds. NECK: Neck supple, non-tender without lymphadenopathy, masses or thyromegaly. JVD-neg  BACK: Examination of the spine reveals normal gait and posture, no spinal deformity, symmetry of spinal muscles, without tenderness, decreased range of motion or muscular spasm  MUSKULOSKELETAL: Adequately aligned spine. No joint erythema or tenderness. EXTREMITIES: No edema. Peripheral pulses intact. NEURO: Nonfocal      Labs:   CBC:  Recent Labs     01/11/20 2010 01/12/20  0446   WBC 5.3 4.8   RBC 3.72* 3.23*   HGB 11.5* 10.0*   HCT 36.5 30.7*   MCV 98.1 95.1   MCH 30.9 31.0   MCHC 31.5 32.6   RDW 15.9* 15.8*    162   MPV 9.5 8.6      BMP:   Recent Labs     01/11/20 2010 01/12/20  0446   * 138   K 4.5 3.7   CL 89* 99   CO2 25 27   BUN 34* 32*   CREATININE 1.40* 1.09*   GLUCOSE 708* 237*   CALCIUM 8.8 8.6      Phosphorus:  No results for input(s): PHOS in the last 72 hours. Magnesium:   Recent Labs     01/11/20 2010   MG 1.8     Albumin: No results for input(s): LABALBU in the last 72 hours.         Radiology:  Reviewed

## 2020-01-12 NOTE — CARE COORDINATION
CASE MANAGEMENT NOTE:    Admission Date:  1/11/2020 Akbar Wall is a [de-identified] y.o.  female    Admitted for : No admission diagnoses are documented for this encounter. Met with:  Patient    PCP:  PHILIP Dasilva                                Insurance:  Medicare/ [employer]      Current Residence/ Living Arrangements:  independently at home with  recently hip pain needs help with chores and  is helping         Current Services PTA:  No    Is patient agreeable to VNS: Yes    Freedom of choice provided: Yes    List of [address] provided: No    VNS chosen:  No    DME:  straight cane, shower chair and other Glucometer and supplies     Home Oxygen: No    Nebulizer: No    CPAP/BIPAP: Yes BUT doesn't use it     Supplier: doesn't know     Potential Assistance Needed: No    SNF needed: No    Pharmacy:  [address] [place] [address]        [facility] the [facility] than [facility]? Yes  If yes, pt needs a follow up appointment made within 7 days. Does Patient want to use MEDS to BEDS? No    Family Members/Caregivers that pt would like involved in their care:    Yes    If yes, list name here:  Lit Stein:  Patient             Is patient in Isolation/One on One/Altered Mental Status? No  If yes, skip next question. If no, would they like an I-Pad to  use? No  If yes, call [phone].              Discharge Plan:  01/11/2020 [address] [facility] Josesito Claros Lives with  in a 2 story home but does not use 2nd floor DMV straight cane shower chair glucometer /supplies (CPAP but doesn't use it) Hyperglycemia Plan to return home with  will except may need PT OT r/t hip pain /mk                Electronically signed by: Mike Santamaria RN on 1/11/2020 at 9:04 PM

## 2020-01-12 NOTE — ED PROVIDER NOTES
EMERGENCY DEPARTMENT ENCOUNTER    Pt Name: Augustin Dahl  MRN: [id number]  [place] 1940  Date of evaluation: 1/11/20  CHIEF COMPLAINT       Chief Complaint   Patient presents with    Chest Pain     HISTORY OF PRESENT ILLNESS   HPI     This is an 14-year-old female with a history CAD has 7 stents in place multiple myeloma amyloidosis atrial fibrillation on Plavix and Eliquis who comes in today with chest pain she states around 3:30 PM whenever she walks a short distance she feels short of breath and feels pain on her left side that goes into the center of her chest this is better with rest she took 2-3 nitro today which improved her symptoms but because of the persistence of her symptoms she came here for further evaluation. The patient  started on prednisone yesterday for left lower extremity swelling as well as right hip pain. States that she has a chronic cough this is not new. She denies any headache congestion recent illness no abdominal pain nausea vomiting burning with urination. She reports compliance with all of her medications she did not take an aspirin today. REVIEW OF SYSTEMS     Review of Systems   Constitutional: Negative for fever. HENT: Negative for congestion. Respiratory: Positive for cough and shortness of breath. Cardiovascular: Positive for chest pain. Gastrointestinal: Negative for abdominal pain, diarrhea and vomiting. Genitourinary: Negative for dysuria. Musculoskeletal: Negative for back pain. Skin: Negative for rash. Neurological: Negative for headaches. All other systems reviewed and are negative.     PASTMEDICAL HISTORY     Past Medical History:   Diagnosis Date    Allergic rhinitis     Atrial fibrillation (HCC)     CAD (coronary artery disease)     s/p stents  total x5    Cholelithiasis     Chronic cough 9/14/2011    Chronic cystitis 9/14/2011    Colitis 09/2016    GERD 9/14/2011    Gout     Guillain-[place] ([facility] [address]     history of     Hyperlipidemia     Hypertension     Irritable bowel syndrome     Migraines     hx. of    Multiple myeloma ([facility] [address]     OA (osteoarthritis) 9/14/2011    Osteoarthritis     Osteopenia     Pneumonia     Post-menopausal     vaginal atrophy    Pulmonary nodules     Raynaud's disease     S/P cardiac cath     x4    Sleep apnea     no machine at night.     Thyroid disease     Hypothyroid    Type 2 diabetes mellitus 9/14/2011    Type II or unspecified type diabetes mellitus without mention of complication, not stated as uncontrolled      SURGICAL HISTORY       Past Surgical History:   Procedure Laterality Date    APPENDECTOMY  07/16/12    APPENDECTOMY      CARDIAC CATHETERIZATION  1999, 2009, 2013, 2016    CARDIAC CATHETERIZATION  10/22/13    CARDIAC CATHETERIZATION  1/16/18    mid LAD stent    CHOLECYSTECTOMY  4/1988    COLONOSCOPY  5/5/09    CORONARY ANGIOPLASTY WITH STENT PLACEMENT  1/18/2016    stent X 1    CORONARY ANGIOPLASTY WITH STENT PLACEMENT  05/2019    lad x 2    KNEE ARTHROSCOPY Right 2054    UMBILICAL HERNIA REPAIR  1/820/2019    UPPER GASTROINTESTINAL ENDOSCOPY  5/5/09     CURRENT MEDICATIONS       Previous Medications    ACYCLOVIR (ZOVIRAX) 200 MG CAPSULE    Take 200 mg by mouth 2 times daily     ALBUTEROL SULFATE HFA (PROVENTIL HFA) 108 (90 BASE) MCG/ACT INHALER    Inhale 2 puffs into the lungs every 6 hours as needed for Wheezing    ALLOPURINOL (ZYLOPRIM) 300 MG TABLET    TAKE 1 TABLET BY MOUTH ONE TIME A DAY     APIXABAN (ELIQUIS) 2.5 MG TABS TABLET    Take 1 tablet by mouth 2 times daily    BIOTIN 5000 MCG TABS    Take 1 tablet by mouth nightly     BORTEZOMIB (VELCADE) 3.5 MG CHEMO INNJECTION    Infuse 1 mg/m2 intravenously every 14 days     CARVEDILOL (COREG) 3.125 MG TABLET    Take 3.125 mg by mouth 2 times daily    CLOPIDOGREL (PLAVIX) 75 MG TABLET    Take 1 tablet by mouth daily    FLUTICASONE-SALMETEROL (ADVAIR DISKUS) 100-50 MCG/DOSE DISKUS INHALER    INHALE ONE PUFF BY MOUTH Tobacco Use    Smoking status: Never Smoker    Smokeless tobacco: Never Used   Substance Use Topics    Alcohol use: Not Currently     Alcohol/week: 1.0 standard drinks     Types: 1 Standard drinks or equivalent per week     Frequency: Monthly or less     Drinks per session: 1 or 2     Binge frequency: Monthly     Comment: social    Drug use: No     PHYSICAL EXAM     INITIAL VITALS: BP (!) 151/67   Pulse 95   Temp 98.2 °F (36.8 °C) (Oral)   Resp 16   Ht 5' 1\" (1.549 m)   Wt 218 lb (98.9 kg)   SpO2 97%   BMI 41.19 kg/m²    Physical Exam  Vitals signs and nursing note reviewed. Constitutional:       General: She is not in acute distress. Appearance: She is well-developed. HENT:      Head: Normocephalic and atraumatic. Eyes:      Conjunctiva/sclera: Conjunctivae normal.   Neck:      Musculoskeletal: Neck supple. Cardiovascular:      Rate and Rhythm: Normal rate. Rhythm irregular. Heart sounds: No murmur. No friction rub. Pulmonary:      Effort: Pulmonary effort is normal. No respiratory distress. Breath sounds: Normal breath sounds. Abdominal:      Palpations: Abdomen is soft. Tenderness: There is no tenderness. Musculoskeletal:      Comments: 1+ pitting edema bilaterally no obvious unilateral leg swelling   Skin:     General: Skin is warm and dry. Capillary Refill: Capillary refill takes less than 2 seconds. Neurological:      Mental Status: She is alert. DIAGNOSTIC RESULTS   RADIOLOGY:All plain film, CT, MRI, and formal ultrasound images (except ED bedside ultrasound) are read by the radiologist, see reports below, unless otherwisenoted in MDM or here. CT Chest Pulmonary Embolism W Contrast   Final Result   No evidence of pulmonary embolism or acute pulmonary abnormality. XR CHEST STANDARD (2 VW)   Final Result   No evidence of acute cardiopulmonary disease.          VL Lower Extremity Bilateral Venous Duplex    (Results Pending)     LABS: All lab

## 2020-01-12 NOTE — CONSULTS
predniSONE  20 mg Oral Daily    Followed by   Jessica Hernandez ON 1/17/2020] predniSONE  10 mg Oral Daily    torsemide  20 mg Oral BID    acyclovir  200 mg Oral BID    magnesium oxide  400 mg Oral BID    levothyroxine  75 mcg Oral Daily    insulin lispro protamine & lispro  30 Units Subcutaneous BID WC    rosuvastatin  20 mg Oral Daily    bortezomib  1 mg/m2 Intravenous Q14 Days    carvedilol  3.125 mg Oral BID    mometasone-formoterol  2 puff Inhalation BID    therapeutic multivitamin-minerals  1 tablet Oral Daily    allopurinol  300 mg Oral Daily    gabapentin  100 mg Oral BID    pantoprazole  20 mg Oral Daily    isosorbide mononitrate  30 mg Oral Daily    clopidogrel  75 mg Oral Daily    [START ON 1/17/2020] vitamin D  50,000 Units Oral Once per day on Fri    ocuvite-lutein  1 tablet Oral Nightly    apixaban  5 mg Oral BID    sodium chloride flush  10 mL Intravenous 2 times per day     Continuous Infusions:   dextrose       CBC:   Recent Labs     01/11/20 2010 01/12/20  0446   WBC 5.3 4.8   HGB 11.5* 10.0*    162     BMP:    Recent Labs     01/11/20 2010 01/12/20  0446   * 138   K 4.5 3.7   CL 89* 99   CO2 25 27   BUN 34* 32*   CREATININE 1.40* 1.09*   GLUCOSE 708* 237*     Hepatic: No results for input(s): AST, ALT, ALB, BILITOT, ALKPHOS in the last 72 hours. Troponin: No results for input(s): TROPONINI in the last 72 hours. BNP: No results for input(s): BNP in the last 72 hours. Lipids: No results for input(s): CHOL, HDL in the last 72 hours. Invalid input(s): LDLCALCU  INR: No results for input(s): INR in the last 72 hours. Objective:   Vitals: BP (!) 146/56   Pulse 69   Temp 97.9 °F (36.6 °C) (Oral)   Resp 16   Ht 5' 1\" (1.549 m)   Wt 218 lb 14.7 oz (99.3 kg)   SpO2 95%   BMI 41.36 kg/m²   General appearance: alert and cooperative with exam  HEENT: Head: Normal, normocephalic, atraumatic.   Neck: no adenopathy, no JVD, supple, symmetrical, trachea midline and thyroid Examination of participant in clinical trial     Organ-limited amyloidosis ([facility] [address]     Anemia     Atherosclerosis of arteries of extremities ([address]     Intermittent claudication ([address]     Thrush, oral     Ambulates with cane     Chest pain     Obesity, Class II, BMI 35-39.9     New onset atrial fibrillation (HCC)     NSTEMI (non-ST elevated myocardial infarction) (HCC)     Hyperkalemia     GUZMAN (acute kidney injury) ([address]     Age-related nuclear cataract, bilateral     Cotton wool spots     Retinal hemorrhage, bilateral     Morbid obesity with BMI of 40.0-44.9, adult ([facility] [address]      Plan of Treatment:   Continue current medication  DC Eliquis  Start IV Heparin IV tomorrow AM no bolus Keep APTT 50-75    Pt need cardiac cath discussed with Pt and Family    Electronically signed by Maxx Ramey MD on 1/12/2020 at 2:01 PM

## 2020-01-13 NOTE — PROGRESS NOTES
Date:   1/13/2020  Patient name: Kay Rios  Date of admission:  1/11/2020  7:50 PM  MRN:   [id number]  YOB: 1940  PCP: PHILIP Kaye CNP    Reason for Admission: Chest pain [R07.9]  Chest pain [R07.9]    Cardiology follow-up: Chest pain       Impression     Coronary artery disease, multiple coronary intervention  Cardiac cath 5/10/2019 normal left main, proximal LAD 80%, distal LAD 70%, drug-eluting stent placement proximal LAD and distal LAD, patient had jaw pain during balloon inflation  No significant disease in the RCA, patent stent in the circumflex, normal ejection fraction 60%   Chronic A Fib  Hypertension  Hyperlipidemia  Morbid obesity BMI 37  Diabetes mellitus on insulin  Chronic kidney disease is stage III  History of multiple myeloma, status post chemo, [facility]  History of amyloidosis                               History of presenting illness     [de-identified]years old female with past medical history of coronary artery disease got admitted with exertional chest pain and shortness of breath. Her chest pain is located in the center of the chest and it improves after taking nitroglycerin. She has been symptomatic over the last few days. She has also experienced jaw pain during exertion. When she had a stent placement in the LAD in May 2019 she had a severe jaw pain. According to her  she gets tired very quickly and her color does not look good.     Labs 1/11/2020 sodium 130, potassium 4.5, BUN 34, creatinine 1.40, glucose 708  High-sensitivity troponin XX 1, proBNP 1260  Hemoglobin 11.5, platelets 041, WBC 5.3  Chest x-ray no acute cardiopulmonary process    1/13/2020  Patient seen and examined  She was having her dinner  No chest pain no palpitation no diaphoresis  Medications:   Scheduled Meds:   insulin lispro  0-12 Units Subcutaneous TID     insulin lispro  0-6 Units Subcutaneous Nightly    [START ON 1/14/2020] predniSONE  20 mg Oral lower leg     Cardiac amyloidosis (HCC)     Cardiomyopathy, nonischemic (HCC)     Chronic diastolic CHF (congestive heart failure) ([facility] [address]     Examination of participant in clinical trial     Organ-limited amyloidosis ([facility] [address]     Anemia     Atherosclerosis of arteries of extremities (HCC)     Intermittent claudication ([address]     Thrush, oral     Ambulates with cane     Chest pain     Obesity, Class II, BMI 35-39.9     New onset atrial fibrillation (HCC)     NSTEMI (non-ST elevated myocardial infarction) ([facility])     Hyperkalemia     GUZMAN (acute kidney injury) ([address]     Age-related nuclear cataract, bilateral     Cotton wool spots     Retinal hemorrhage, bilateral     Morbid obesity with BMI of 40.0-44.9, adult ([address]      Plan of Treatment:   Continue with current medication  N.p.o. after midnight  Cardiac cath tomorrow at noon time at [place]    Electronically signed by Panchito Armas MD on 1/13/2020 at 5:45 PM

## 2020-01-13 NOTE — FLOWSHEET NOTE
01/13/20 1535   Encounter Summary   Services provided to: Patient and family together   Referral/Consult From: [facility]   Continue Visiting   (1/13/20V)   Volunteer Visit Yes   Complexity of Encounter Low   Length of Encounter 15 minutes   Routine   Type Follow up   [address] Patient received communion;Family received communion

## 2020-01-13 NOTE — PROGRESS NOTES
FAMILY MEDICINE  - PROGRESS NOTE    Date:  1/13/2020  Frannie Coombs  [id number]      Chief Complaint   Patient presents with    Chest Pain         Interval History:  not changed, she is doing fine. She denies any current chest pain. She is scheduled to have a cardiac cath on Tuesday as she needs to bee off of Eliquis for 2 days prior. Heparin drip started.       Subjective  Respiratory: positive for JAMES  Cardiovascular: positive for chest pain  Gastrointestinal: positive for reflux symptoms  Musculoskeletal:positive for arthralgias, myalgias and stiff joints  Behavioral/Psych: positive for obesity:    Objective:    BP (!) 152/83   Pulse 71   Temp 97.3 °F (36.3 °C) (Oral)   Resp 16   Ht 5' 1\" (1.549 m)   Wt 219 lb 5.7 oz (99.5 kg)   SpO2 100%   BMI 41.45 kg/m²   General appearance - alert, well appearing, and in no distress and overweight  Mental status - alert, oriented to person, place, and time  Eyes - pupils equal and reactive, extraocular eye movements intact  Ears - hearing grossly normal bilaterally  Nose - normal and patent, no erythema, discharge or polyps  Mouth - mucous membranes moist, pharynx normal without lesions  Neck - supple, no significant adenopathy  Lymphatics - no palpable lymphadenopathy, no hepatosplenomegaly  Chest - clear to auscultation, no wheezes, rales or rhonchi, symmetric air entry  Heart - normal rate, regular rhythm, normal S1, S2, no murmurs, rubs, clicks or gallops  Abdomen - soft, nontender, nondistended, no masses or organomegaly  Breasts - not examined  Back exam - not examined  Neurological - alert, oriented, normal speech, no focal findings or movement disorder noted  Musculoskeletal - osteoarthritic changes noted in both hands  Extremities - peripheral pulses normal, no pedal edema, no clubbing or cyanosis  Skin - normal coloration and turgor, no rashes, no suspicious skin lesions noted    Data:   Medications: Cardiac amyloidosis (HCC)     Cardiomyopathy, nonischemic (HCC)     Chronic diastolic CHF (congestive heart failure) ([facility] [address]     Examination of participant in clinical trial     Organ-limited amyloidosis ([facility] [address]     Anemia     Atherosclerosis of arteries of extremities (HCC)     Intermittent claudication ([address]     Thrush, oral     Ambulates with cane     Chest pain     Obesity, Class II, BMI 35-39.9     New onset atrial fibrillation (HCC)     NSTEMI (non-ST elevated myocardial infarction) ([facility])     Hyperkalemia     GUZMAN (acute kidney injury) ([address]     Age-related nuclear cataract, bilateral     Cotton wool spots     Retinal hemorrhage, bilateral     Morbid obesity with BMI of 40.0-44.9, adult ([address]           Plan:  Chest pain with CAD - cardiac cath planned for Tuesday. Continue current treatments. Complete orders per chart.     See orders   Disposition:    Electronically signed by Domenica Jones MD on 1/13/2020 at 5:35 AM

## 2020-01-13 NOTE — CARE COORDINATION
[address] Transitions Interview     2020    Patient: Ankit Resendiz Patient : 1940   MRN: [id number]  Reason for Admission: chest pain  RARS: Readmission Risk Score: 39         Spoke with: Jackie Sadler spoke to patient, she informed writer she is going to [facility] tomorrow for a cardiac cath, will continue to follow for discharge plan//JU    No vns at this time    Readmission Risk  Patient Active Problem List   Diagnosis    Chronic cystitis    Coronary artery disease involving native coronary artery of native heart without angina pectoris    GERD    Chronic cough    OA (osteoarthritis)    LV dysfunction    Edema    Microalbuminuria    Hypothyroidism    Osteopenia    Mild carotid artery disease ([address]    Chronic gout without tophus    S/P coronary artery stent placement    Chronic renal insufficiency    Pure hypercholesterolemia    Cervical pain (neck)    Paraproteinemia    Colitis    Morbid obesity due to excess calories ([address]    Multiple myeloma ([address]    JAMES (obstructive sleep apnea)    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 ([address]    Essential hypertension    Fall at home    Dyspnea on exertion    Vitamin D deficiency    Anemia    Pain of left lower leg    Cardiac amyloidosis (HCC)    Cardiomyopathy, nonischemic (HCC)    Chronic diastolic CHF (congestive heart failure) ([address]    Examination of participant in clinical trial    Organ-limited amyloidosis ([address]    Anemia    Atherosclerosis of arteries of extremities ([address]    Intermittent claudication ([address]    Thrush, oral    Ambulates with cane    Chest pain    Obesity, Class II, BMI 35-39.9    New onset atrial fibrillation (HCC)    NSTEMI (non-ST elevated myocardial infarction) ([address]    Hyperkalemia    GUZMAN (acute kidney injury) ([address]    Age-related nuclear cataract, bilateral    Cotton wool spots    Retinal CARDIAC [facility]   2/13/2020 10:00 AM [facility]ARD [facility] [place] [facility] CARDIAC [facility]   2/18/2020  9:00 AM [facility] CARD RE[facility] [place] [facility] CARDIAC [facility]   2/20/2020  9:00 AM STC CARD [facility] [place] [facility] CARDIAC [facility]   3/13/2020 12:15 PM Sean Lopez MD [facility] None   4/14/2020  1:00 PM Keely Hayes 21 Maintenance  There are no preventive care reminders to display for this patient.     Jesusita Zapata RN

## 2020-01-13 NOTE — PROGRESS NOTES
Dr. Omid Arnolds in to see patient. Received orders for npo after midnight as he is going to plan for heart cath around noon tomorrow.

## 2020-01-13 NOTE — PLAN OF CARE
Problem: Falls - Risk of:  Goal: Will remain free from falls  Description  Will remain free from falls  1/13/2020 1816 by Vaishnavi Higgins RN  Outcome: Met This Shift  1/13/2020 0754 by Jessa Mandujano RN  Outcome: Ongoing  Note:   Patient remains free of incidence/ injury. Bed remains in low position. Up per self. Goal: Absence of physical injury  Description  Absence of physical injury  Outcome: Met This Shift     Problem: Safety:  Goal: Free from accidental physical injury  Description  Free from accidental physical injury  Outcome: Met This Shift  Goal: Free from intentional harm  Description  Free from intentional harm  Outcome: Met This Shift     Problem: Pain:  Goal: Patient's pain/discomfort is manageable  Description  Patient's pain/discomfort is manageable  1/13/2020 1816 by Vaishnavi Higgins RN  Outcome: Met This Shift  1/13/2020 0754 by Jessa Mandujano RN  Outcome: Ongoing  Note:   Pt denies need for prn pain medication this shift.       Problem: Discharge Planning:  Goal: Patients continuum of care needs are met  Description  Patients continuum of care needs are met  Outcome: Met This Shift

## 2020-01-13 NOTE — PLAN OF CARE
Problem: Falls - Risk of:  Goal: Will remain free from falls  Description  Will remain free from falls  Outcome: Ongoing  Note:   Patient remains free of incidence/ injury. Bed remains in low position. Up per self. Problem: Pain:  Goal: Patient's pain/discomfort is manageable  Description  Patient's pain/discomfort is manageable  Outcome: Ongoing  Note:   Pt denies need for prn pain medication this shift.

## 2020-01-14 NOTE — PROGRESS NOTES
Route Frequency Provider Last Rate Last Dose    albuterol sulfate  (90 Base) MCG/ACT inhaler 2 puff  2 puff Inhalation Q6H PRN Pavel King MD        insulin lispro (HUMALOG) injection vial 0-12 Units  0-12 Units Subcutaneous TID WC Pavel King MD   8 Units at 01/13/20 1736    insulin lispro (HUMALOG) injection vial 0-6 Units  0-6 Units Subcutaneous Nightly Pavel King MD   5 Units at 01/13/20 2206    nitroGLYCERIN (NITROSTAT) SL tablet 0.4 mg  0.4 mg Sublingual Q5 Min PRN Pavel King MD        ondansetron [facility]) tablet 4 mg  4 mg Oral Q8H PRN Pavel King MD        predniSONE (DELTASONE) tablet 20 mg  20 mg Oral Daily Pavel King MD        Followed by   Yamileth Mcconnell ON 1/17/2020] predniSONE (DELTASONE) tablet 10 mg  10 mg Oral Daily Pavel King MD        torsemide [facility]) tablet 20 mg  20 mg Oral BID Pavel King MD   20 mg at 01/13/20 2200    glucose (GLUTOSE) 40 % oral gel 15 g  15 g Oral PRN Pavel King MD        dextrose 50 % IV solution  12.5 g Intravenous PRN Pavel King MD        glucagon (rDNA) injection 1 mg  1 mg Intramuscular PRN Pavel King MD        dextrose 5 % solution  100 mL/hr Intravenous PRN Pavel King MD        nitroGLYCERIN (NITROSTAT) SL tablet 0.4 mg  0.4 mg Sublingual Q5 Min PRN Pavel King MD        acyclovir (ZOVIRAX) capsule 200 mg  200 mg Oral BID Pavel King MD   200 mg at 01/13/20 2200    ondansetron (ZOFRAN-ODT) disintegrating tablet 4 mg  4 mg Oral Q8H PRN Pavel King MD        magnesium oxide (MAG-OX) tablet 400 mg  400 mg Oral BID Pavel King MD   400 mg at 01/13/20 2200    levothyroxine (SYNTHROID) tablet 75 mcg  75 mcg Oral Daily Pavel King MD   75 mcg at 01/13/20 3285    insulin lispro protamine & lispro (HUMALOG MIX) (75-25) 100 UNIT per ML injection vial SUSP 30 Units  30 Units Subcutaneous BID  Pavel King MD   30 Units at 01/13/20 2570    rosuvastatin (Anant Sullivan) tablet 20 mg  20 mg Oral Daily Domenica Jones MD   20 mg at 01/13/20 0852    bortezomib (VELCADE) chemo injection 2 mg  1 mg/m2 Intravenous Q14 Days Domenica Jones MD        carvedilol (COREG) tablet 3.125 mg  3.125 mg Oral BID Domenica Jones MD   3.125 mg at 01/13/20 2200    mometasone-formoterol (DULERA) 100-5 MCG/ACT inhaler 2 puff  2 puff Inhalation BID Domenica Jones MD   2 puff at 01/13/20 2200    albuterol sulfate  (90 Base) MCG/ACT inhaler 2 puff  2 puff Inhalation Q6H PRN Domenica Jones MD        therapeutic multivitamin-minerals 1 tablet  1 tablet Oral Daily Domenica Jones MD   1 tablet at 01/13/20 0849    allopurinol (ZYLOPRIM) tablet 300 mg  300 mg Oral Daily Domenica Jones MD   300 mg at 01/13/20 0849    gabapentin (NEURONTIN) capsule 100 mg  100 mg Oral BID Domenica Jones MD   100 mg at 01/13/20 2200    pantoprazole (PROTONIX) tablet 20 mg  20 mg Oral Daily Domenica Jones MD   20 mg at 01/13/20 0853    isosorbide mononitrate (IMDUR) extended release tablet 30 mg  30 mg Oral Daily oDmenica Jones MD   30 mg at 01/13/20 0849    clopidogrel (PLAVIX) tablet 75 mg  75 mg Oral Daily Domenica Jones MD   75 mg at 01/13/20 0849    [START ON 1/17/2020] vitamin D (ERGOCALCIFEROL) capsule 50,000 Units  50,000 Units Oral Once per day on Fri Domenica Jones MD        antioxidant multivitamin (OCUVITE) tablet  1 tablet Oral Nightly Domenica Jones MD   1 tablet at 01/13/20 2206    heparin (porcine) injection 4,000 Units  4,000 Units Intravenous PRN Akin Garcia MD        heparin (porcine) injection 2,000 Units  2,000 Units Intravenous PRN Akin Garcia MD        heparin 25,000 units in dextrose 5% 250 mL infusion  12 Units/kg/hr Intravenous Continuous Akin Garcia MD 4 mL/hr at 01/14/20 0140 4 Units/kg/hr at 01/14/20 0140    sodium chloride flush 0.9 % injection 10 mL  10 mL Intravenous 2 times per day Domenica Jones MD   10 mL at 01/13/20 0853    sodium Range    PTT >150.0 (HH) 24.0 - 36.0 sec   POC Glucose Fingerstick    Collection Time: 01/13/20  4:22 PM   Result Value Ref Range    POC Glucose 331 (H) 65 - 105 mg/dL   POC Glucose Fingerstick    Collection Time: 01/13/20  8:25 PM   Result Value Ref Range    POC Glucose 360 (H) 65 - 105 mg/dL   APTT    Collection Time: 01/13/20 11:49 PM   Result Value Ref Range    .2 (HH) 24.0 - 36.0 sec     -----------------------------------------------------------------  RAD:  EKG:  Micro:     Assessment & Plan:    Patient Active Problem List:     Chronic cystitis     Coronary artery disease involving native coronary artery of native heart without angina pectoris     GERD     Chronic cough     OA (osteoarthritis)     LV dysfunction     Edema     Microalbuminuria     Hypothyroidism     Osteopenia     Mild carotid artery disease (HCC)     Chronic gout without tophus     S/P coronary artery stent placement     Chronic renal insufficiency     Pure hypercholesterolemia     Cervical pain (neck)     Paraproteinemia     Colitis     Morbid obesity due to excess calories (HCC)     Multiple myeloma (HCC)     JAMES (obstructive sleep apnea)     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 (HCC)     Essential hypertension     Fall at home     Dyspnea on exertion     Vitamin D deficiency     Anemia     Pain of left lower leg     Cardiac amyloidosis (HCC)     Cardiomyopathy, nonischemic (HCC)     Chronic diastolic CHF (congestive heart failure) ([address]     Examination of participant in clinical trial     Organ-limited amyloidosis ([address]     Anemia     Atherosclerosis of arteries of extremities (HCC)     Intermittent claudication ([address]     Thrush, oral     Ambulates with cane     Chest pain     Obesity, Class II, BMI 35-39.9     New onset atrial fibrillation (HCC)     NSTEMI (non-ST elevated myocardial infarction) ([facility])     Hyperkalemia     GUZMAN (acute kidney injury) ([address] Age-related nuclear cataract, bilateral     Cotton wool spots     Retinal hemorrhage, bilateral     Morbid obesity with BMI of 40.0-44.9, adult (HCC)           Plan:  Chest pain with CAD - cardiac cath planned for Noon today. Continue current treatments. Await results of Cath. Complete orders per chart.     See orders   Disposition:    Electronically signed by Leelee Jenkins MD on 1/14/2020 at 5:40 AM

## 2020-01-14 NOTE — PLAN OF CARE
Problem: Falls - Risk of:  Goal: Will remain free from falls  Description  Will remain free from falls  1/14/2020 0414 by Cheri Ballesteros RN  Outcome: Met This Shift  1/13/2020 1816 by Danielle Brown RN  Outcome: Met This Shift  Goal: Absence of physical injury  Description  Absence of physical injury  1/14/2020 0414 by Cheri Ballesteros RN  Outcome: Met This Shift  1/13/2020 1816 by Danielle Brown RN  Outcome: Met This Shift     Problem: Safety:  Goal: Free from accidental physical injury  Description  Free from accidental physical injury  1/14/2020 0414 by Cheri Ballesteros RN  Outcome: Ongoing  1/13/2020 1816 by Danielle Brown RN  Outcome: Met This Shift  Goal: Free from intentional harm  Description  Free from intentional harm  1/14/2020 0414 by Cheri Ballesteros RN  Outcome: Ongoing  1/13/2020 1816 by Danielle Brown RN  Outcome: Met This Shift     Problem: Pain:  Goal: Patient's pain/discomfort is manageable  Description  Patient's pain/discomfort is manageable  1/14/2020 0414 by Cheri Ballesteros RN  Outcome: Ongoing  1/13/2020 1816 by Danielle Brown RN  Outcome: Met This Shift     Problem: Discharge Planning:  Goal: Patients continuum of care needs are met  Description  Patients continuum of care needs are met  1/14/2020 0414 by Cheri Ballesteros RN  Outcome: Ongoing  1/13/2020 1816 by Danielle Brown RN  Outcome: Met This Shift

## 2020-01-14 NOTE — PROGRESS NOTES
Patient admitted, consent signed and questions answered. Patient ready for procedure. Call light to reach with side rails up 2 of 2. BILATERAL GROINS clipped. FAMILY at bedside with patient. History and physical updated. patient transferred from [address] [place] [address] Patient alert, oriented ,denies any pain at this time.

## 2020-01-14 NOTE — PROGRESS NOTES
Date:   1/14/2020  Patient name: Oli Acuña  Date of admission:  1/11/2020  7:50 PM  MRN:   [id number]  YOB: 1940  PCP: PHILIP Baker CNP    Reason for Admission: Chest pain [R07.9]  Chest pain [R07.9]    Cardiology follow-up: Chest pain       Impression     Coronary artery disease, multiple coronary intervention  Cardiac cath 5/10/2019 normal left main, proximal LAD 80%, distal LAD 70%, drug-eluting stent placement proximal LAD and distal LAD, patient had jaw pain during balloon inflation  No significant disease in the RCA, patent stent in the circumflex, normal ejection fraction 60%   Chronic A Fib  Hypertension  Hyperlipidemia  Morbid obesity BMI 37  Diabetes mellitus on insulin  Chronic kidney disease is stage III  History of multiple myeloma, status post chemo, patient [facility]  History of amyloidosis    History of presenting illness     [de-identified]years old female with past medical history of coronary artery disease got admitted with exertional chest pain and shortness of breath.  Her chest pain is located in the center of the chest and it improves after taking nitroglycerin.  She has been symptomatic over the last few days.  She has also experienced jaw pain during exertion.  When she had a stent placement in the LAD in May 2019 she had a severe jaw pain.  According to her  she gets tired very quickly and her color does not look good.     Labs 1/11/2020 sodium 130, potassium 4.5, BUN 34, creatinine 1.40, glucose 708  High-sensitivity troponin XX 1, proBNP 1260  Hemoglobin 11.5, platelets 494, WBC 5.3  Chest x-ray no acute cardiopulmonary process    January 14, 2020  Patient seen and examined in the Cath Lab area around [facility]  Hemodynamically stable  She did not have chest pain or shortness of breath    Medications:   Scheduled Meds:   acetylcysteine  600 mg Oral BID    insulin lispro  0-12 Units Subcutaneous TID WC    insulin lispro  0-6 Units Subcutaneous Nightly    predniSONE  20 mg Oral Daily    Followed by   Jami Beavers ON 1/17/2020] predniSONE  10 mg Oral Daily    acyclovir  200 mg Oral BID    magnesium oxide  400 mg Oral BID    levothyroxine  75 mcg Oral Daily    insulin lispro protamine & lispro  30 Units Subcutaneous BID     rosuvastatin  20 mg Oral Daily    bortezomib  1 mg/m2 Intravenous Q14 Days    carvedilol  3.125 mg Oral BID    mometasone-formoterol  2 puff Inhalation BID    therapeutic multivitamin-minerals  1 tablet Oral Daily    allopurinol  300 mg Oral Daily    gabapentin  100 mg Oral BID    pantoprazole  20 mg Oral Daily    isosorbide mononitrate  30 mg Oral Daily    clopidogrel  75 mg Oral Daily    [START ON 1/17/2020] vitamin D  50,000 Units Oral Once per day on Fri    ocuvite-lutein  1 tablet Oral Nightly    sodium chloride flush  10 mL Intravenous 2 times per day     Continuous Infusions:   sodium chloride 50 mL/hr at 01/14/20 1018    dextrose      heparin (porcine) Stopped (01/14/20 0738)     CBC:   Recent Labs     01/11/20 2010 01/12/20 0446 01/13/20  0915   WBC 5.3 4.8 5.8   HGB 11.5* 10.0* 11.5*    162 186     BMP:    Recent Labs     01/12/20 0446 01/13/20  0915 01/14/20  0850    134* 136   K 3.7 4.1 4.0   CL 99 93* 94*   CO2 27 27 30   BUN 32* 29* 31*   CREATININE 1.09* 1.15* 1.24*   GLUCOSE 237* 373* 191*     Hepatic: No results for input(s): AST, ALT, ALB, BILITOT, ALKPHOS in the last 72 hours. Troponin: No results for input(s): TROPONINI in the last 72 hours. BNP: No results for input(s): BNP in the last 72 hours. Lipids: No results for input(s): CHOL, HDL in the last 72 hours.     Invalid input(s): LDLCALCU  INR:   Recent Labs     01/13/20 0915   INR 1.3       Objective:   Vitals: BP (!) 146/86   Pulse 78   Temp 97.6 °F (36.4 °C) (Oral)   Resp 16   Ht 5' 1\" (1.549 m)   Wt 219 lb 5.7 oz (99.5 kg)   SpO2 99%   BMI 41.45 kg/m²   General appearance: alert and cooperative with exertion     Vitamin D deficiency     Anemia     Pain of left lower leg     Cardiac amyloidosis (HCC)     Cardiomyopathy, nonischemic (HCC)     Chronic diastolic CHF (congestive heart failure) ([address]     Examination of participant in clinical trial     Organ-limited amyloidosis ([address]     Anemia     Atherosclerosis of arteries of extremities (HCC)     Intermittent claudication ([address]     Thrush, oral     Ambulates with cane     Chest pain     Obesity, Class II, BMI 35-39.9     New onset atrial fibrillation (HCC)     NSTEMI (non-ST elevated myocardial infarction) ([facility])     Hyperkalemia     GUZMAN (acute kidney injury) ([address]     Age-related nuclear cataract, bilateral     Cotton wool spots     Retinal hemorrhage, bilateral     Morbid obesity with BMI of 40.0-44.9, adult ([address]      Plan of Treatment:   Case discussed with intervention cardiologist  Cardiac cath planned today    Electronically signed by Raleigh Payan MD on 1/14/2020 at 5:20 PM

## 2020-01-14 NOTE — FLOWSHEET NOTE
01/14/20 1317   Encounter Summary   Services provided to: Patient and family together   Referral/Consult From: [facility]   Continue Visiting   (1/14/20V)   Volunteer Visit Yes   Complexity of Encounter Low   Length of Encounter 15 minutes   Routine   Type Follow up   [address] Patient received communion;Family received communion

## 2020-01-14 NOTE — OP NOTE
Vascular disease   [x] Prior PCI              [x] Diabetes Mellitus    [] Left Main PCI. [] Currently on Dialysis. [] Prior CABG. [] Currently smoker. [] Cardiac Arrest outside of healthcare facility. [] Yes    [x] No        Witnessed     [] Yes   [] No     Arrest after arrival of EMS  [] Yes   [] No     [] Cardiac Arrest at other Facility. [] Yes   [x] No    Pre-Procedure Information. Heart Failure       [] Yes    [x] No        Class  [] I      [] II  [] III    [] IV. New Diagnosis    [] Yes  [] No    HF Type      [] Systolic   [] Diastolic          [] Unknown. Diagnostic Test:   EKG       [] Normal   [] Abnormal    New antiarrhythmia medications:    [] Yes   [x] No   New onset atrial fibrillation / Flutter     [] Yes   [x] No   ECG Abnormalities:      [] V. Fib   [] Shannon V. Tach           [] NS V. T   [] New LBBB           [] T. Inv  []  ST dev > 0.5 mm     A fib    [] PVC's freq  [] PVC's infrequent    Stress Test Performed:   [] Yes    [x] No           Cardiac CTA Performed:  [] Yes    [x] No          Pre Procedure Medications: [] Yes    [x] No         [] ASA  [x] Beta Blockers      [] Nitrate  [] Ca Channel Blockers      [] Ranolazine  [x] Statin       [x] Plavix/Others antiplatelets      Estimated blood loss is 10 ml    Electronically signed by Gordon Queen MD on 1/14/2020 at 6:02 PM  Fellow cardiology    I was present during entire procedure and performed all critical elements of the procedure.     Anselmo Benitez MD

## 2020-01-15 NOTE — PROGRESS NOTES
Nephrology Progress Note    Interval history: Patient underwent cardiac catheterization yesterday at [facility] with no significant progression of coronary artery disease and plan is for medical management. She is asymptomatic today and is nonoliguric. She received prophylactic hydration prior to cardiac catheterization and renal function remains stable at this time. History of present illness: This is a [de-identified] y.o. female past medical history of CAD (s/p PTCA/stent placement at [facility] in mid January 2016),GERD,C3-4 vertebral disc disease,type 2 DM (with diabetic retinopathy and proteinuria  and systemic hypertension. History of monoclonal gammopathy /multiple myeloma, cardiac amyloidosis [following up at [facility] and on [facility]]. Patient presented to the hospital with complaints of chest pain that has been going on for few days patient feels it more on the left side L also felt chest tightness.   Patient feels whenever she does walk or exertion she gets this pain  Patient had a CTA received IV contrast 1/11/2020 did not show any PE  Creatinine remained stable at 1.0 mg/dL, baseline serum creatinine is between 1.0 to 1.4 mg/dL      Objective/     Vitals:    01/14/20 2032 01/14/20 2327 01/15/20 0524 01/15/20 0743   BP: 134/81 (!) 130/56  (!) 148/71   Pulse: 75 76  60   Resp: 16 16  16   Temp: 97.4 °F (36.3 °C) 97.7 °F (36.5 °C)  97.4 °F (36.3 °C)   TempSrc: Oral Oral  Oral   SpO2: 97% 97%  97%   Weight:   214 lb 8.1 oz (97.3 kg)    Height:         24HR INTAKE/OUTPUT:      Intake/Output Summary (Last 24 hours) at 1/15/2020 1120  Last data filed at 1/15/2020 4291  Gross per 24 hour   Intake 528 ml   Output --   Net 528 ml     Patient Vitals for the past 96 hrs (Last 3 readings):   Weight   01/15/20 0524 214 lb 8.1 oz (97.3 kg)   01/13/20 0430 219 lb 5.7 oz (99.5 kg)   01/12/20 0413 218 lb 14.7 oz (99.3 kg)       Constitutional:  Alert, awake, no apparent distress  Cardiovascular: S1, S2 with no pericardial rub or gallop. Respiratory:  Clinically clear lung fields. Abdomen: Soft with normal bowel sounds. Ext:  LE edema    Data/  Recent Labs     01/13/20  0915   WBC 5.8   HGB 11.5*   HCT 35.5*   MCV 96.6        Recent Labs     01/13/20  0915 01/14/20  0850 01/15/20  0447   * 136 135   K 4.1 4.0 4.5   CL 93* 94* 96*   CO2 27 30 28   GLUCOSE 373* 191* 379*   PHOS  --   --  2.8   BUN 29* 31* 28*   CREATININE 1.15* 1.24* 1.04*   LABGLOM 45* 42* 51*   GFRAA 55* 50* >60     Assessment/Plan:     1. Acute kidney injury superimposed on chronic kidney disease stage III - consistent with prerenal azotemia. Renal function is back to baseline and there is no evidence of contrast nephropathy. Plan: Discontinue IV fluid. Okay to restart torsemide 20 mg p.o. daily. 2.  Systemic hypertension - blood pressure is adequately controlled.     3. Hyponatremia - corrected. 4.  Normocytic anemia - multifactorial etiology including multiple myeloma and chronic kidney disease. Hemoglobin is stable at 11.5 g/dL. No renal objection to discharge. Patient will follow-up with me in the office in 4 weeks.     Dorian De Jesus  Attending Clinical Nephrologist

## 2020-01-15 NOTE — PROGRESS NOTES
FAMILY MEDICINE  - PROGRESS NOTE    Date:  1/15/2020  Galindo Grajeda  [id number]      Chief Complaint   Patient presents with    Chest Pain         Interval History:  not changed, she was tired after her cath yesterday but this am feels fine. She denies any chest pain.       Subjective  Constitutional: negative  Eyes: positive for contacts/glasses  Ears, nose, mouth, throat, and face: negative  Respiratory: positive for JAMES  Cardiovascular: positive for lower extremity edema  Gastrointestinal: positive for reflux symptoms  Musculoskeletal:positive for arthralgias, myalgias and stiff joints  Neurological: negative  Behavioral/Psych: positive for obesity  Endocrine: positive for diabetic symptoms including hyperglycemia and hypothyroid:    Objective:    BP (!) 130/56   Pulse 76   Temp 97.7 °F (36.5 °C) (Oral)   Resp 16   Ht 5' 1\" (1.549 m)   Wt 214 lb 8.1 oz (97.3 kg)   SpO2 97%   BMI 40.53 kg/m²   General appearance - alert, well appearing, and in no distress and overweight  Mental status - alert, oriented to person, place, and time  Eyes - pupils equal and reactive, extraocular eye movements intact  Ears - hearing grossly normal bilaterally  Nose - normal and patent, no erythema, discharge or polyps  Mouth - mucous membranes moist, pharynx normal without lesions  Neck - supple, no significant adenopathy  Lymphatics - no palpable lymphadenopathy, no hepatosplenomegaly  Chest - clear to auscultation, no wheezes, rales or rhonchi, symmetric air entry, decreased air entry noted posteriorly  Heart - normal rate, regular rhythm, normal S1, S2, no murmurs, rubs, clicks or gallops  Abdomen - soft, nontender, nondistended, no masses or organomegaly  Breasts - not examined  Back exam - not examined  Neurological - alert, oriented, normal speech, no focal findings or movement disorder noted  Musculoskeletal - osteoarthritic changes noted in both hands  Extremities - peripheral pulses normal, no pedal edema, no clubbing or cyanosis  Skin - normal coloration and turgor, no rashes, no suspicious skin lesions noted    Data:   Medications:   Current Facility-Administered Medications   Medication Dose Route Frequency Provider Last Rate Last Dose    0.9 % sodium chloride infusion   Intravenous Continuous Martha Yu MD 50 mL/hr at 01/14/20 1018      acetylcysteine (MUCOMYST) 20 % solution 600 mg  600 mg Oral BID Martha Yu MD   600 mg at 01/14/20 1026    apixaban (ELIQUIS) tablet 2.5 mg  2.5 mg Oral BID Huseyin Flores MD        albuterol sulfate  (90 Base) MCG/ACT inhaler 2 puff  2 puff Inhalation Q6H PRN Saadia Diaz MD        insulin lispro (HUMALOG) injection vial 0-12 Units  0-12 Units Subcutaneous TID WC Saadia Diaz MD   8 Units at 01/13/20 1736    insulin lispro (HUMALOG) injection vial 0-6 Units  0-6 Units Subcutaneous Nightly Saadia Diaz MD   4 Units at 01/14/20 2146    nitroGLYCERIN (NITROSTAT) SL tablet 0.4 mg  0.4 mg Sublingual Q5 Min PRN Saadia Diaz MD        on[facility]etron [facility]) tablet 4 mg  4 mg Oral Q8H PRN Saadia Diaz MD        predniSONE (DELTASONE) tablet 20 mg  20 mg Oral Daily Saadia Diaz MD   20 mg at 01/14/20 0749    Followed by   Felipe Bauer ON 1/17/2020] predniSONE (DELTASONE) tablet 10 mg  10 mg Oral Daily Saadia Diaz MD        glucose (GLUTOSE) 40 % oral gel 15 g  15 g Oral PRN Saadia Diaz MD        dextrose 50 % IV solution  12.5 g Intravenous PRN Saadia Diaz MD        glucagon (rDNA) injection 1 mg  1 mg Intramuscular PRN Saadia Diaz MD        dextrose 5 % solution  100 mL/hr Intravenous PRN Saadia Diaz MD        nitroGLYCERIN (NITROSTAT) SL tablet 0.4 mg  0.4 mg Sublingual Q5 Min PRN Saadia Diaz MD        acyclovir (ZOVIRAX) capsule 200 mg  200 mg Oral BID Saadia Diaz MD   200 mg at 01/14/20 2145    ondansetron (ZOFRAN-ODT) disintegrating tablet 4 mg  4 mg Oral Q8H PRN Kae Mercado MD        magnesium oxide (MAG-OX) tablet 400 mg  400 mg Oral BID Kae Mercado MD   400 mg at 01/14/20 2145    levothyroxine (SYNTHROID) tablet 75 mcg  75 mcg Oral Daily Kae Mercado MD   75 mcg at 01/14/20 0748    insulin lispro protamine & lispro (HUMALOG MIX) (75-25) 100 UNIT per ML injection vial SUSP 30 Units  30 Units Subcutaneous BID WC Kae Mercado MD   30 Units at 01/13/20 1736    rosuvastatin (CRESTOR) tablet 20 mg  20 mg Oral Daily Kae Mercado MD   20 mg at 01/14/20 2145    bortezomib (VELCADE) chemo injection 2 mg  1 mg/m2 Intravenous Q14 Days Kae Mercado MD        carvedilol (COREG) tablet 3.125 mg  3.125 mg Oral BID Kae Mercado MD   3.125 mg at 01/14/20 2146    mometasone-formoterol (DULERA) 100-5 MCG/ACT inhaler 2 puff  2 puff Inhalation BID Kae Mercado MD   2 puff at 01/14/20 2146    albuterol sulfate  (90 Base) MCG/ACT inhaler 2 puff  2 puff Inhalation Q6H PRN Kae Mercado MD        therapeutic multivitamin-minerals 1 tablet  1 tablet Oral Daily Kae Mercado MD   1 tablet at 01/14/20 0748    allopurinol (ZYLOPRIM) tablet 300 mg  300 mg Oral Daily Kae Mercado MD   300 mg at 01/14/20 0749    gabapentin (NEURONTIN) capsule 100 mg  100 mg Oral BID Kae Mercado MD   100 mg at 01/14/20 2146    pantoprazole (PROTONIX) tablet 20 mg  20 mg Oral Daily Kae Mercado MD   20 mg at 01/14/20 0800    isosorbide mononitrate (IMDUR) extended release tablet 30 mg  30 mg Oral Daily Kae Mercado MD   30 mg at 01/14/20 0749    clopidogrel (PLAVIX) tablet 75 mg  75 mg Oral Daily Kae Mercado MD   75 mg at 01/14/20 0748    [START ON 1/17/2020] vitamin D (ERGOCALCIFEROL) capsule 50,000 Units  50,000 Units Oral Once per day on Fri Kae Mercado MD        antioxidant multivitamin (OCUVITE) tablet  1 tablet Oral Nightly Kae Mercado MD   1 tablet at 01/14/20 5081    sodium Phosphorus 2.8 2.6 - 4.5 mg/dL    Sodium 135 135 - 144 mmol/L    Potassium 4.5 3.7 - 5.3 mmol/L    Chloride 96 (L) 98 - 107 mmol/L    CO2 28 20 - 31 mmol/L    Anion Gap 11 9 - 17 mmol/L    GFR Non-African American 51 (L) >60 mL/min    GFR African American >60 >60 mL/min    GFR Comment          GFR Staging NOT REPORTED      -----------------------------------------------------------------  RAD:  EKG:  Micro:     Assessment & Plan:    Patient Active Problem List:     Chronic cystitis     Coronary artery disease involving native coronary artery of native heart without angina pectoris     GERD     Chronic cough     OA (osteoarthritis)     LV dysfunction     Edema     Microalbuminuria     Hypothyroidism     Osteopenia     Mild carotid artery disease (HCC)     Chronic gout without tophus     S/P coronary artery stent placement     Chronic renal insufficiency     Pure hypercholesterolemia     Cervical pain (neck)     Paraproteinemia     Colitis     Morbid obesity due to excess calories (HCC)     Multiple myeloma (HCC)     JAMES (obstructive sleep apnea)     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 (HCC)     Essential hypertension     Fall at home     Dyspnea on exertion     Vitamin D deficiency     Anemia     Pain of left lower leg     Cardiac amyloidosis (HCC)     Cardiomyopathy, nonischemic (HCC)     Chronic diastolic CHF (congestive heart failure) ([address]     Examination of participant in clinical trial     Organ-limited amyloidosis ([address]     Anemia     Atherosclerosis of arteries of extremities (HCC)     Intermittent claudication ([address]     Thrush, oral     Ambulates with cane     Chest pain     Obesity, Class II, BMI 35-39.9     New onset atrial fibrillation (HCC)     NSTEMI (non-ST elevated myocardial infarction) ([facility])     Hyperkalemia     GUZMAN (acute kidney injury) ([address]     Age-related nuclear cataract, bilateral     Cotton wool spots     Retinal hemorrhage, bilateral     Morbid obesity with BMI of 40.0-44.9, adult ([address]           Plan:  CAD - cardiac cath negative, Eliquis resumed at 5 mg BID. Okay to D/C home with VNS. Follow up next week with Kristina Alfaro CNP. Complete orders per chart.     See orders   Disposition:    Electronically signed by Kae Mercado MD on 1/15/2020 at 5:30 AM

## 2020-01-15 NOTE — PROGRESS NOTES
Date:   1/15/2020  Patient name: Magdalene Mckeon  Date of admission:  1/11/2020  7:50 PM  MRN:   [id number]  YOB: 1940  PCP: PHILIP Minaya CNP    Reason for Admission: Chest pain [R07.9]  Chest pain [R07.9]    Cardiology follow-up: Chest pain       Impression     Coronary artery disease, multiple coronary intervention  Cardiac cath 5/10/2019 normal left main, proximal LAD 80%, distal LAD 70%, drug-eluting stent placement proximal LAD and distal LAD, patient had jaw pain during balloon inflation  No significant disease in the RCA, patent stent in the circumflex, normal ejection fraction 60%   Chronic A Fib  Hypertension  Hyperlipidemia  Morbid obesity BMI 37  Diabetes mellitus on insulin  Chronic kidney disease is stage III  History of multiple myeloma, status post chemo, patient follow-up at [facility]  History of amyloidosis     Cardiac cath: 1/15/2020 normal left main, patent stents in the LAD and RCA, luminal irregularities and calcification circumflex, ejection fraction more than 60%     History of presenting illness     [de-identified]years old female with past medical history of coronary artery disease got admitted with exertional chest pain and shortness of breath.  Her chest pain is located in the center of the chest and it improves after taking nitroglycerin.  She has been symptomatic over the last few days.  She has also experienced jaw pain during exertion.  When she had a stent placement in the LAD in May 2019 she had a severe jaw pain.  According to her  she gets tired very quickly and her color does not look good.     Labs 1/11/2020 sodium 130, potassium 4.5, BUN 34, creatinine 1.40, glucose 708  High-sensitivity troponin XX 1, proBNP 1260  Hemoglobin 11.5, platelets 541, WBC 5.3  Chest x-ray no acute cardiopulmonary process    1/15/2020  Patient seen and examined  She is ambulating more than 100 steps at a slow pace with a walker no chest pain    Creatinine 1.04, potassium 40.53 kg/m²   General appearance: alert and cooperative with exam  HEENT: Head: Normal, normocephalic, atraumatic. Neck: no adenopathy, no JVD, supple, symmetrical, trachea midline and thyroid not enlarged, symmetric, no tenderness/mass/nodules  Lungs: diminished breath sounds bibasilar  Heart: irregularly irregular rhythm  Abdomen: Obese, bowel sounds present  Extremities: Homans sign is negative, no sign of DVT  Neurologic: Mental status: Alert, oriented, thought content appropriate    EKG: atrial fibrillation, rate 70, unchanged from previous tracings. ECHO: reviewed. Ejection fraction: 60%  Stress Test: not obtained. Cardiac Angiography: reviewed.         Assessment / Acute Cardiac Problems:   Patient admitted with exertional chest pain like heaviness, radiating to the jaw associated with the shortness of breath and fatigue, pain improved after rest and nitroglycerin     ECG showed A. fib, no ST depression or elevation, sensitivity troponin XX 1  History of stent placement in the proximal and distal LAD, circumflex, RCA  Normal LV systolic function  CKD stage III  Multiple myeloma  Morbid obesity     January 14, 2020 hemodynamically stable  January 15, 2020 patient able to ambulate more than 100 steps at a slow pace with a walker no chest pain, right groin no hematoma, peripheral pulses normal    Patient Active Problem List:     Chronic cystitis     Coronary artery disease involving native coronary artery of native heart without angina pectoris     GERD     Chronic cough     OA (osteoarthritis)     LV dysfunction     Edema     Microalbuminuria     Hypothyroidism     Osteopenia     Mild carotid artery disease (HCC)     Chronic gout without tophus     S/P coronary artery stent placement     Chronic renal insufficiency     Pure hypercholesterolemia     Cervical pain (neck)     Paraproteinemia     Colitis     Morbid obesity due to excess calories (HCC)     Multiple myeloma (HCC)     JAMES (obstructive sleep apnea)     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 (HCC)     Essential hypertension     Fall at home     Dyspnea on exertion     Vitamin D deficiency     Anemia     Pain of left lower leg     Cardiac amyloidosis (HCC)     Cardiomyopathy, nonischemic (HCC)     Chronic diastolic CHF (congestive heart failure) ([address]     Examination of participant in clinical trial     Organ-limited amyloidosis ([address]     Anemia     Atherosclerosis of arteries of extremities (HCC)     Intermittent claudication ([address]     Thrush, oral     Ambulates with cane     Chest pain     Obesity, Class II, BMI 35-39.9     New onset atrial fibrillation (HCC)     NSTEMI (non-ST elevated myocardial infarction) (HCC)     Hyperkalemia     GUZMAN (acute kidney injury) ([address]     Age-related nuclear cataract, bilateral     Cotton wool spots     Retinal hemorrhage, bilateral     Morbid obesity with BMI of 40.0-44.9, adult ([address]      Plan of Treatment:   Okay to DC home on current medication  Low up in 4 to 6 weeks  At present patient does not need Ranexa    Electronically signed by Yvrose Rainey MD on 1/15/2020 at 2:53 PM

## 2020-01-15 NOTE — PROGRESS NOTES
Received post cath procedure to [facility] room 2. Assessment obtained. Restrictions reviewed with patient. Post procedure pathway initiated. groin site soft , clean dry and intact. No hematoma noted. Family at side. Patient without complaints. Head of bed flat with right leg straight.

## 2020-01-15 NOTE — FLOWSHEET NOTE
01/15/20 1539   Encounter Summary   Services provided to: Patient   Referral/Consult From: [facility]   (1/15/20V)   Volunteer Visit Yes   Complexity of Encounter Low   Length of Encounter 15 minutes   Routine   Type Follow up   [address] [facility]

## 2020-01-15 NOTE — DISCHARGE INSTR - COC
None to display          Nurse Assessment:  Last Vital Signs: BP (!) 130/56   Pulse 76   Temp 97.7 °F (36.5 °C) (Oral)   Resp 16   Ht 5' 1\" (1.549 m)   Wt 214 lb 8.1 oz (97.3 kg)   SpO2 97%   BMI 40.53 kg/m²     Last documented pain score (0-10 scale): Pain Level: 0  Last Weight:   Wt Readings from Last 1 Encounters:   01/15/20 214 lb 8.1 oz (97.3 kg)     Mental Status:  {IP PT MENTAL STATUS:20030}    IV Access:  { QUIQUE IV ACCESS:408074062}    Nursing Mobility/ADLs:  Walking   {CHP DME QBAL:696666394}  Transfer  {CHP DME WNYZ:586208221}  Bathing  {CHP DME OAJV:714655693}  Dressing  {CHP DME OXFU:632461694}  Toileting  {CHP DME OIYP:040553746}  Feeding  {[facility] DME FHNC:320362650}  Med Admin  {P DME JYUV:564920174}  Med Delivery   { QUIQUE MED Delivery:914956939}    Wound Care Documentation and Therapy:        Elimination:  Continence:   · Bowel: {YES / II:64668}  · Bladder: {YES / PH:36943}  Urinary Catheter: {Urinary Catheter:142435535}   Colostomy/Ileostomy/Ileal Conduit: {YES / EU:26547}       Date of Last BM: ***    Intake/Output Summary (Last 24 hours) at 1/15/2020 0531  Last data filed at 1/14/2020 1105  Gross per 24 hour   Intake 221.55 ml   Output 1000 ml   Net -778.45 ml     I/O last 3 completed shifts:   In: 221.6 [I.V.:221.6]  Out: 2000 [Urine:2000]    Safety Concerns:     [employer] Safety Concerns:347511604}    Impairments/Disabilities:      [employer] Impairments/Disabilities:462438548}    Nutrition Therapy:  Current Nutrition Therapy:   [employer] Diet List:991617602}    Routes of Feeding: {P DME Other Feedings:709363355}  Liquids: {Slp liquid thickness:74246}  Daily Fluid Restriction: {CHP DME Yes amt example:087219408}  Last Modified Barium Swallow with Video (Video Swallowing Test): {Done Not Done BSJT:050557772}    Treatments at the Time of Hospital Discharge:   Respiratory Treatments: ***  Oxygen Therapy:  {Therapy; copd oxygen:61753}  Ventilator:    {MH CC Vent RYTB:626926479}    Rehab Therapies: {THERAPEUTIC INTERVENTION:1588212300}  Weight Bearing Status/Restrictions: 50Masha Ambrocio CC Weight Bearin}  Other Medical Equipment (for information only, NOT a DME order):  {EQUIPMENT:532903127}  Other Treatments: ***    Patient's personal belongings (please select all that are sent with patient):  {CHP DME Belongings:523394686}    RN SIGNATURE:  {Esignature:571984249}    CASE MANAGEMENT/SOCIAL WORK SECTION    Inpatient Status Date: ***    Readmission Risk Assessment Score:  Readmission Risk              Risk of Unplanned Readmission:        41           Discharging to Facility/ Agency   · Name:   · Address:  · Phone:  · Fax:    Dialysis Facility (if applicable)   · Name:  · Address:  · Dialysis Schedule:  · Phone:  · Fax:    / signature: {Esignature:371891914}    PHYSICIAN SECTION    Prognosis: {Prognosis:5828572495}    Condition at Discharge: 50Masha Ambrocio Patient Condition:034595706}    Rehab Potential (if transferring to Rehab): {Prognosis:2854445815}    Recommended Labs or Other Treatments After Discharge: ***    Physician Certification: I certify the above information and transfer of Lelo Kwok  is necessary for the continuing treatment of the diagnosis listed and that she requires {Admit to Appropriate Level of Care:87162} for {GREATER/LESS:238826366} 30 days.      Update Admission H&P: No change in H&P    PHYSICIAN SIGNATURE:  Electronically signed by Tyler Snow MD on 1/15/20 at 5:31 AM

## 2020-01-16 NOTE — DISCHARGE SUMMARY
Osteoarthritis     Osteopenia     Pneumonia     Post-menopausal     vaginal atrophy    Pulmonary nodules     Raynaud's disease     S/P cardiac cath     x4    Sleep apnea     no machine at night.  Thyroid disease     Hypothyroid    Type 2 diabetes mellitus 9/14/2011    Type II or unspecified type diabetes mellitus without mention of complication, not stated as uncontrolled      The patient was seen and examined on day of discharge and this discharge summary is in conjunction with any daily progress note from day of discharge.     Code Status:  Prior    Hospital Course: uncomplicated    Consults:  cardiology and nephrology    Significant Diagnostic Studies: as above, and as follows: cardiac cath    Treatments: as above    Disposition: home with home care    Discharged Condition: Stable    Follow Up:  PHILIP Srinivasan CNP in one week    Discharge Medications:    Pancho Amin   Home Medication Instructions LTT:[id number]    Printed on:01/16/20 9437   Medication Information                      acetylcysteine (MUCOMYST) 20 % nebulizer solution  Take 600 mg by mouth every 6 hours Indications: 2 doses after procedure             acyclovir (ZOVIRAX) 200 MG capsule  Take 200 mg by mouth 2 times daily              albuterol sulfate HFA (PROVENTIL HFA) 108 (90 Base) MCG/ACT inhaler  Inhale 2 puffs into the lungs every 6 hours as needed for Wheezing             allopurinol (ZYLOPRIM) 300 MG tablet  TAKE 1 TABLET BY MOUTH ONE TIME A DAY              apixaban (ELIQUIS) 5 MG TABS tablet  Take 1 tablet by mouth 2 times daily             Biotin 5000 MCG TABS  Take 1 tablet by mouth nightly              bortezomib (VELCADE) 3.5 MG chemo innjection  Infuse 4 mg intravenously every 14 days             carvedilol (COREG) 3.125 MG tablet  Take 3.125 mg by mouth 2 times daily             clopidogrel (PLAVIX) 75 MG tablet  Take 1 tablet by mouth daily             fluticasone-salmeterol (ADVAIR DISKUS) 100-50 MCG/DOSE diskus inhaler  INHALE ONE PUFF BY MOUTH EVERY 12 HOURS             gabapentin (NEURONTIN) 100 MG capsule  Take 1 capsule by mouth 2 times daily. insulin 70-30 (NOVOLIN 70/30) (70-30) 100 UNIT per ML injection vial  Inject 30 Units into the skin 2 times daily              insulin aspart (NOVOLOG) 100 UNIT/ML injection vial  Inject 6 Units into the skin 2 times daily (before meals)              isosorbide mononitrate (IMDUR) 30 MG extended release tablet  Take 30 mg by mouth daily              levothyroxine (SYNTHROID) 75 MCG tablet  Take 75 mcg by mouth Daily             magnesium oxide (MAG-OX) 400 (241.3 Mg) MG TABS tablet  Take 1 tablet by mouth 2 times daily for 15 days             Multiple Vitamins-Minerals (PRESERVISION AREDS) CAPS  Take 1 capsule by mouth nightly             Multiple Vitamins-Minerals (THERAPEUTIC MULTIVITAMIN-MINERALS) tablet  Take 1 tablet by mouth daily             nitroGLYCERIN (NITROSTAT) 0.4 MG SL tablet  Place 1 tablet under the tongue every 5 minutes as needed for Chest pain             ondansetron (ZOFRAN ODT) 4 MG disintegrating tablet  Take 1 tablet by mouth every 8 hours as needed for Nausea             pantoprazole (PROTONIX) 20 MG tablet  TAKE 1 TABLET BY MOUTH ONE TIME A DAY              predniSONE (DELTASONE) 10 MG tablet  Sig: 3 tablets daily by mouth for 3 days, then 2 tablets daily for 3 days, then 1 tablet daily for 3 days             rosuvastatin (CRESTOR) 20 MG tablet  Take 20 mg by mouth daily             torsemide (DEMADEX) 20 MG tablet  Take 20 mg by mouth 2 times daily             vitamin D (ERGOCALCIFEROL) 1.25 MG (08116 UT) CAPS capsule  TAKE 1 CAPSULE BY MOUTH ONE TIME A WEEK                   Activity: activity as tolerated    Diet: cardiac diet    Time Spent on discharge is more than 30 minutes in the examination, evaluation, counseling and review of medications and discharge plan.       Electronically signed by Danica Glynn MD on 1/16/2020 at 5:38 AM

## 2020-01-16 NOTE — CARE COORDINATION
Christine 45 Transitions Initial Follow Up Call    Call within 2 business days of discharge: Yes    Patient: Oli Acuña Patient : 1940   MRN: [id number]  Reason for Admission: CP  Discharge Date: 1/15/20 RARS: Readmission Risk Score: 44      Last Discharge [facility]       Complaint Diagnosis Description Type Department Provider    20 Chest Pain Chest pain, unspecified type . .. ED to Hosp-Admission (Discharged) (ADMITTED) Balwinder Walls MD; [address] .. Spoke with: [address] Romario Cristóbal: [address] [facility]    Non-face-to-face services provided:  Obtained and reviewed discharge summary and/or continuity of care documents  Assessment and support for treatment adherence and medication management-reviewed discharge instructions and medications, 1111F order completed   Writer spoke to [facility],  reviewed discharge instructions and medications, 1111F order completed, Patient stated she was feeling ok, just tired, no CP at this time, had a cardiac cath at [facility]. V's , no stent placement, has all her medications, no vns, has a tcm appointment on 2020.  Confirmed patient has writer contact information, agreed to further care transitions, will continue to follow//JU    Care Transitions 24 Hour Call    Do you have any ongoing symptoms?:  No  Do you have a copy of your discharge instructions?:  Yes  Do you have all of your prescriptions and are they filled?:  Yes (Comment:  picking up eliquis + gabopentin now)  Were you discharged with any Home Care or Post Acute Services:  No  Patient DME:  Straight cane  Do you have support at home?:  Partner/Spouse/SO  Do you feel like you have everything you need to keep you well at home?:  Yes  Are you an active caregiver in your home?:  No  Care Transitions Interventions     Other Services:   (Comment: chemo)                                  Follow Up  Future Appointments   Date Time Provider Valerie Recinos   2020  9:15 AM [facility]lyss[facility]add[facility]   1/21/2020 10:00 AM STC CARD REHAB RM 07 [address] [facility] [address] [place]   1/23/2020 10:00 AM STC CARD [facility] RM 07 [address] [place] [place]   1/27/2020 11:30 AM Angélica Patel DPM [facility]   1/28/2020 10:00 AM STC CARD REHAB RM 07 [address] [facility] [address]ARDI[address] [place]   1/30/2020 10:00 AM STC CARD REHAB RM 07 [facility] [place]I[address] [place]   2/4/2020 10:00 AM STC CARD REHAB RM 07 [facility] [place] [place]   2/6/2020  9:00 AM STC CARD REHAB RM 07 [facility] [place]I[facility] [place]   2/11/2020 10:00 AM STC CARD REHAB RM 07 [facility] [place]   2/13/2020 10:00 AM STC CARD REHAB RM 07 [facility] [place] [facility] [place]   2/18/2020  9:00 AM STC CARD REHAB RM 07 [facility] [place]   2/20/2020  9:00 AM STC CARD [facility] RM 07 [facility] [place]   2/25/2020  3:00 PM Ezekiel Myers MD [facility] None   4/14/2020  1:00 PM Kaylan Granados, APRN - [address] [facility] Sonal Tyson RN

## 2020-01-20 NOTE — CARE COORDINATION
Christine 45 Transitions Follow Up Call    2020    Patient: Rossi Mejias  Patient : 1940   MRN: [id number]  Reason for Admission: CP  Discharge Date: 1/15/20 RARS: Readmission Risk Score: 44         # 1 attempt- unable to reach patient, left vm message with name and call back number, requested call back//JU    Care Transitions Subsequent and Final Call    Subsequent and Final Calls  Care Transitions Interventions     Other Services:   (Comment: chemo)                           Other Interventions:             Follow Up  Future Appointments   Date Time Provider Valerie Recinos   2020 10:00 AM STC CARD [facility] RM 07 [address] [facility] CARDIAC [place]   2020 10:00 AM STC CARD [facility] RM 07 [address] [facility] [address] [place]   2020 11:30 AM Janice Lefort, DPM [facility]   2020 10:00 AM STC CARD REHAB RM 07 [address] [facility] [address] [place]   2020 10:00 AM STC CARD REHAB RM 07 [facility] CARDI[address] [place]   2020 10:00 AM STC CARD REHAB RM 07 [facility]ARDI[address] [place]   2020  9:00 AM STC CARD REHAB RM 07 [facility] CARDI[address] [place]   2020 10:00 AM STC CARD REHAB RM 07 [facility]ARDI[address] [place]   2020 10:00 AM STC CARD REHAB RM 07 [facility]ARDI[address] [place]   2020  9:00 AM STC CARD REHAB RM 07 [address] [facility] [address]ARDI[address] [place]   2020  9:00 AM STC CARD [facility] RM 07 [address] [facility] [address] [place]   2020 12:15 PM Doug Zuñiga MD [facility] None   2020  1:00 PM Dara Valdes, APRN - [id number] [facility] Tobias Ahmadi RN

## 2020-01-23 NOTE — CARE COORDINATION
I called and spoke to Lizbet Solis and explained the Eliquis PAP guidelines. She has spent about 500 already this year. She took down my phone number and will call me when she meets the guidelines so we can start her application.         Adele Escobedo [facility]  [address] [facility]   Medication Assistance  MARCIA LENNON II.[employer]    (O)[phone]  (D)[phone]

## 2020-02-03 NOTE — PROGRESS NOTES
Left    Thickness  [x] [x] [x] [x] [x] [x] [x] [x] [x] [x]  5 4 3 2 1 1 2 3 4 5                         Right                                        Left    Dystrophic Changes   [x] [x] [x] [x] [x] [x] [x] [x] [x] [x]  5 4 3 2 1 1 2 3 4 5                         Right                                        Left    Color   [x] [x] [x] [x] [x] [x] [x] [x] [x] [x]  5 4 3 2 1 1 2 3 4 5                          Right                                        Left    Incurvation/Ingrowin   [] [] [] [] [] [] [] [] [] []  5 4 3 2 1 1 2 3 4 5                         Right                                        Left    Inflammation/Pain   [x] [x] [x] [x] [x] [x] [x] [x] [x] [x]  5 4 3 2 1 1 2 3 4 5                         Right                                        Left        Visual inspection:  Deformity: hammertoe deformity sharda feet  amputation: absent  Skin lesions: present - aas abvoe  Edema: right- 2+ pitting edema, left- 2+ pitting edema    Sensory exam:  Monofilament sensation: abnormal - 6/10 via SW 5.07/10g monofilament to the plantar foot bilateral feet    Pulses: abnormal - 1/4 dorsalis pedis pulse and 1/4 Posterior tibial pulse,   Pinprick: Impaired  Proprioception: Impaired  Vibration (128 Hz): Impaired       DM with PVD       [x]Yes    []No      Assessment:  [de-identified] y.o. female with:   Diagnosis Orders   1. Type II diabetes mellitus with peripheral circulatory disorder (HCC)  79333 - ME DEBRIDEMENT OF NAILS, 6 OR MORE    HM DIABETES FOOT EXAM    46399 - ME DESTRUCTION BENIGN LESIONS UP TO 14   2. Dermatophytosis of nail  34369 - ME DEBRIDEMENT OF NAILS, 6 OR MORE    HM DIABETES FOOT EXAM   3. Peripheral vascular disorder (HCC)  47361 - ME DEBRIDEMENT OF NAILS, 6 OR MORE    HM DIABETES FOOT EXAM   4. Pain in both feet  37679 - ME DEBRIDEMENT OF NAILS, 6 OR MORE    HM DIABETES FOOT EXAM    46675 - ME DESTRUCTION BENIGN LESIONS UP TO 14   5.  Benign neoplasm of skin of lower limb, including hip, right  15585 - ME DESTRUCTION BENIGN LESIONS UP TO 14   6. Benign neoplasm of skin of lower limb, including hip, left  95705 - MA DESTRUCTION BENIGN LESIONS UP TO 14           Q7   []Yes    []No                Q8   [x]Yes    []No                     Q9   []Yes    []No    Plan:   Pt was evaluated and examined. Patient was given personalized discharge instructions. The lesion was partially excised and Pyrogallic acid was applied under occlusion. The patient will leave in place for 24-48 hours and than remove. The patient tolerated the procedure well and without complication. Nails 1-10 were debrided sharply in length and thickness with a nipper and , without incident. Pt will follow up in 9 weeks or sooner if any problems arise. Diagnosis was discussed with the pt and all of their questions were answered in detail. Proper foot hygiene and care was discussed with the pt. Informed patient on proper diabetic foot care and importance of tight glycemic control. Patient to check feet daily and contact the office with any questions/problems/concerns.    Other comorbidity noted and will be managed by PCP.  2/3/2020    Electronically signed by Suellen Angelo DPM on 2/3/2020 at 2:45 PM  2/3/2020

## 2020-02-25 PROBLEM — Q79.59: Status: ACTIVE | Noted: 2019-01-18

## 2020-02-25 PROBLEM — Z82.49 FAMILY HX OF ISCHEM HEART DIS AND OTH DIS OF THE CIRC SYS: Status: ACTIVE | Noted: 2019-01-18

## 2020-02-25 PROBLEM — E11.40 TYPE 2 DIABETES MELLITUS WITH DIABETIC NEUROPATHY, UNSPECIFIED (HCC): Status: ACTIVE | Noted: 2019-01-18

## 2020-02-25 PROBLEM — K44.9 DIAPHRAGMATIC HERNIA WITHOUT OBSTRUCTION OR GANGRENE: Status: ACTIVE | Noted: 2019-10-18

## 2020-02-25 PROBLEM — I13.0 HYPERTENSIVE HEART AND CHRONIC KIDNEY DISEASE WITH HEART FAILURE AND STAGE 1 THROUGH STAGE 4 CHRONIC KIDNEY DISEASE, OR UNSPECIFIED CHRONIC KIDNEY DISEASE (HCC): Status: ACTIVE | Noted: 2019-01-18

## 2020-02-25 PROBLEM — Z80.3 FAMILY HISTORY OF MALIGNANT NEOPLASM OF BREAST: Status: ACTIVE | Noted: 2019-01-18

## 2020-02-25 PROBLEM — I72.4 ANEURYSM OF ARTERY OF LOWER EXTREMITY (HCC): Status: ACTIVE | Noted: 2019-05-23

## 2020-02-25 PROBLEM — M79.606 PAIN IN LOWER LIMB: Status: ACTIVE | Noted: 2018-06-06

## 2020-02-25 PROBLEM — Z83.3 FAMILY HISTORY OF DIABETES MELLITUS: Status: ACTIVE | Noted: 2019-01-18

## 2020-02-25 PROBLEM — Z90.49 ACQUIRED ABSENCE OF OTHER SPECIFIED PARTS OF DIGESTIVE TRACT: Status: ACTIVE | Noted: 2019-01-18

## 2020-02-25 PROBLEM — E11.22 TYPE 2 DIABETES MELLITUS WITH DIABETIC CHRONIC KIDNEY DISEASE (HCC): Status: ACTIVE | Noted: 2019-01-18

## 2020-02-25 PROBLEM — K42.0 OBSTRUCTED UMBILICAL HERNIA: Status: ACTIVE | Noted: 2019-01-18

## 2020-02-25 PROBLEM — Z79.899 OTHER LONG TERM (CURRENT) DRUG THERAPY: Status: ACTIVE | Noted: 2019-01-18

## 2020-02-25 PROBLEM — R76.8 INCREASED IMMUNOGLOBULIN: Status: ACTIVE | Noted: 2020-01-01

## 2020-02-25 PROBLEM — E11.65 TYPE 2 DIABETES MELLITUS WITH HYPERGLYCEMIA (HCC): Status: ACTIVE | Noted: 2019-01-18

## 2020-02-25 PROBLEM — I43 SENILE CARDIAC AMYLOIDOSIS (HCC): Status: ACTIVE | Noted: 2017-07-20

## 2020-02-25 PROBLEM — Z95.5 PRESENCE OF CORONARY ANGIOPLASTY IMPLANT AND GRAFT: Status: ACTIVE | Noted: 2019-01-18

## 2020-02-25 PROBLEM — Z88.8 ALLERGY STATUS TO OTHER DRUGS, MEDICAMENTS AND BIOLOGICAL SUBSTANCES STATUS: Status: ACTIVE | Noted: 2019-01-18

## 2020-02-25 PROBLEM — N17.9 ACUTE KIDNEY FAILURE, UNSPECIFIED (HCC): Status: ACTIVE | Noted: 2019-01-18

## 2020-02-25 PROBLEM — E78.5 HYPERLIPIDEMIA, UNSPECIFIED: Status: ACTIVE | Noted: 2019-01-18

## 2020-02-25 PROBLEM — E85.4 SENILE CARDIAC AMYLOIDOSIS (HCC): Status: ACTIVE | Noted: 2017-07-20

## 2020-03-25 PROBLEM — D64.9 ANEMIA: Status: RESOLVED | Noted: 2018-06-05 | Resolved: 2020-01-01

## 2020-05-26 NOTE — PROGRESS NOTES
[address]  [address] [facility]  Dept: [phone]     FOOT PAIN & BENIGN NEOPLASM PROGRESS NOTE  Date of patient's visit: 5/20/2020  Patient's Name:  Allyssa Smith YOB: 1940            Patient Care Team:  PHILIP Murphy CNP as PCP - General (Nurse Practitioner)  PHILIP Murphy CNP as PCP - [facility] Empaneled Provider  Shirley Estrada MD as Consulting Physician (Cardiology)  Jennifer Haney as Consulting Physician (Endocrinology)  Rosy Mcgill MD as Consulting Physician (Urology)  Cristina Gipson MD as Consulting Physician (Internal Medicine)  Yamini Flores MD as Consulting Physician (General Surgery)          Chief Complaint   Patient presents with    Diabetes    Nail Problem    Circulatory Problem       Subjective: This Allyssa Smith comes to clinic for foot and nail care. Pt currently has complaint of thickened, painful, elongated nails that he/she cannot manage by themselves. Pt. Relates pain to nails with shoe gear. Pt's primary care physician is Earl Murphy seen  2/24/2020. Past Medical History:   Diagnosis Date    Allergic rhinitis     Atrial fibrillation (HCC)     CAD (coronary artery disease)     s/p stents  total x5    Cholelithiasis     Chronic cough 9/14/2011    Chronic cystitis 9/14/2011    Colitis 09/2016    GERD 9/14/2011    Gout     Guillain-[place] ([facility] [address]     history of     Hyperlipidemia     Hypertension     Irritable bowel syndrome     Migraines     hx. of    Multiple myeloma ([facility] [address]     OA (osteoarthritis) 9/14/2011    Osteoarthritis     Osteopenia     Pneumonia     Post-menopausal     vaginal atrophy    Pulmonary nodules     Raynaud's disease     S/P cardiac cath     x4    Sleep apnea     no machine at night.     Thyroid disease     Hypothyroid    Type 2 diabetes mellitus 9/14/2011    Type II or unspecified type diabetes mellitus without mention of complication, not stated as uncontrolled    Baylee Mart is a [de-identified] y.o. female. Painful lesions her   feet. . They have been present for 2 week(s) duration. The lesions are present on the bilateral foot. The patient has 2 lesion that is deep seated and has a painful core. Treatment has included pads and tape  Pt has a new complaint of none. Allergies   Allergen Reactions    Hepatitis B Vaccine     Acetaminophen     Oxycodone Hcl     Hepatitis B Virus Vaccines Other (See Comments)     Guillian-[place] (toxic reaction)    Norvasc  [Amlodipine]     Norvasc [Amlodipine Besylate]      Swelling. COMMON SIDE EFFECT      Tramadol      Other reaction(s): Intolerance  Seizures felt to be related    Fentanyl Nausea And Vomiting    Percocet [Oxycodone-Acetaminophen] Nausea And Vomiting    Velcade [Bortezomib] Diarrhea and Rash     Current Outpatient Medications on File Prior to Visit   Medication Sig Dispense Refill    magnesium oxide (MAG-OX) 400 (241.3 Mg) MG TABS tablet Take by mouth      Lancets (ONETOUCH DELICA PLUS [id number]) MISC TEST BLOOD SUGAR THREE TIMES DAILY      vitamin D (ERGOCALCIFEROL) 1.25 MG (86684 UT) CAPS capsule TAKE 1 CAPSULE BY MOUTH ONE TIME A WEEK.  4 capsule 0    K Phos [place]-Sod Phos Di & Mono (PHOSPHA 250 NEUTRAL) 155-852-130 MG TABS Take 1 tablet by mouth daily 14 tablet 0    acyclovir (ZOVIRAX) 200 MG capsule TAKE 1 CAPSULE BY MOUTH TWO TIMES A DAY      bortezomib (VELCADE) 3.5 MG chemo innjection Infuse 4 mg intravenously every 14 days 2 mg 0    clopidogrel (PLAVIX) 75 MG tablet Take 1 tablet by mouth daily 90 tablet 1    isosorbide mononitrate (IMDUR) 30 MG extended release tablet Take 30 mg by mouth daily       pantoprazole (PROTONIX) 20 MG tablet TAKE 1 TABLET BY MOUTH ONE TIME A DAY  90 tablet 10    torsemide (DEMADEX) 20 MG tablet Take 20 mg by mouth 2 times daily      allopurinol (ZYLOPRIM) 300 MG tablet TAKE 1 TABLET BY MOUTH ONE TIME A DAY  30 tablet 9    albuterol sulfate HFA (PROVENTIL HFA) 108 (90 Base) MCG/ACT inhaler Inhale 2 puffs into the lungs every 6 hours as needed for Wheezing 1 Inhaler 3    Multiple Vitamins-Minerals (THERAPEUTIC MULTIVITAMIN-MINERALS) tablet Take 1 tablet by mouth daily 30 tablet 11    fluticasone-salmeterol (ADVAIR DISKUS) 100-50 MCG/DOSE diskus inhaler INHALE ONE PUFF BY MOUTH EVERY 12 HOURS 60 each 3    carvedilol (COREG) 3.125 MG tablet Take 3.125 mg by mouth 2 times daily      Multiple Vitamins-Minerals (PRESERVISION AREDS) CAPS Take 1 capsule by mouth nightly      rosuvastatin (CRESTOR) 20 MG tablet Take 20 mg by mouth daily      levothyroxine (SYNTHROID) 75 MCG tablet Take 75 mcg by mouth Daily      insulin 70-30 (NOVOLIN 70/30) (70-30) 100 UNIT per ML injection vial Inject 30 Units into the skin 2 times daily       ondansetron (ZOFRAN ODT) 4 MG disintegrating tablet Take 1 tablet by mouth every 8 hours as needed for Nausea 20 tablet 0    insulin aspart (NOVOLOG) 100 UNIT/ML injection vial Inject 6 Units into the skin 2 times daily (before meals)       nitroGLYCERIN (NITROSTAT) 0.4 MG SL tablet Place 1 tablet under the tongue every 5 minutes as needed for Chest pain 25 tablet 3    Biotin 5000 MCG TABS Take 1 tablet by mouth nightly        No current facility-administered medications on file prior to visit. Review of Systems    Review of Systems:   History obtained from chart review and the patient  General ROS: negative for - chills, fatigue, fever, night sweats or weight gain  Constitutional: Negative for chills, diaphoresis, fatigue, fever and unexpected weight change. Musculoskeletal: Positive for arthralgias, gait problem and joint swelling. Neurological ROS: negative for - behavioral changes, confusion, headaches or seizures. Negative for weakness and numbness. Dermatological ROS: negative for - mole changes, rash  Cardiovascular: Negative for leg swelling.    Gastrointestinal: Negative for constipation, diarrhea, nausea and vomiting. Objective:  Dermatologic Exam:  Skin lesion present to the right and left plantar foot with a central core and petchaie noted to the lesions periphery. Pain on palpation of the lesion    Skin is thin, with flaky sloughing skin as well as decreased hair growth to the lower leg  Small red hemosiderin deposits seen dorsal foot   Musculoskeletal:     1st MPJ ROM decreased, Bilateral.  Muscle strength 5/5, Bilateral.  Pain present upon palpation of toenails 1-5, Bilateral. decreased medial longitudinal arch, Bilateral.  Ankle ROM decreased,Bilateral.    Dorsally contracted digits present digits 2, Bilateral.     Vascular: DP pulses 1/4 bilateral.  PT pulses 0/4 bilateral.   CFT <5 seconds, Bilateral.  Hair growth absent to the level of the digits, Bilateral.  Edema present, Bilateral.  Varicosities absent, Bilateral. Erythema absent, Bilateral    Neurological: Sensation diminshed to light touch to level of digits, Bilateral.  Protective sensation intact 6/10 sites via 5.07/10g [place]-Lars Monofilament, Bilateral.  negative Tinel's, Bilateral.  negative Valleix sign, Bilateral.      Integument: Warm, dry, supple, Bilateral.  Open lesion absent, Bilateral.  Interdigital maceration absent to web spaces 4, Bilateral.  Nails 1-5 left and 1-5 right thickened > 3.0 mm, dystrophic and crumbly, discolored with subungual debris. Fissures absent, Bilateral.   General: AAO x 3 in NAD.     Derm  Toenail Description  Sites of Onychomycosis Involvement (Check affected area)  [x] [x] [x] [x] [x] [x] [x] [x] [x] [x]  5 4 3 2 1 1 2 3 4 5                          Right                                        Left    Thickness  [x] [x] [x] [x] [x] [x] [x] [x] [x] [x]  5 4 3 2 1 1 2 3 4 5                         Right                                        Left    Dystrophic Changes   [x] [x] [x] [x] [x] [x] [x] [x] [x] [x]  5 4 3 2 1 1 2 3 4 5                         Right Left    Color  [x] [x] [x] [x] [x] [x] [x] [x] [x] [x]  5 4 3 2 1 1 2 3 4 5                          Right                                        Left    Incurvation/Ingrowin   [] [] [] [] [] [] [] [] [] []  5 4 3 2 1 1 2 3 4 5                         Right                                        Left    Inflammation/Pain   [x] [x] [x] [x] [x] [x] [x] [x] [x] [x]  5 4 3  2 1 1 2 3 4 5                         Right                                        Left       Nails are painful 1-10 with direct palpation. Q7   []Yes  []No                Q8   [x]Yes  []No                     Q9   []Yes    []No    Assessment:  [de-identified] y.o. female with:    Diagnosis Orders   1. Type II diabetes mellitus with peripheral circulatory disorder (HCC)  03938 - IN DEBRIDEMENT OF NAILS, 6 OR MORE    HM DIABETES FOOT EXAM    32369 - IN DESTRUCTION BENIGN LESIONS UP TO 14   2. Dermatophytosis of nail  36874 - IN DEBRIDEMENT OF NAILS, 6 OR MORE    HM DIABETES FOOT EXAM   3. Peripheral vascular disorder (HCC)  62182 - IN DEBRIDEMENT OF NAILS, 6 OR MORE    HM DIABETES FOOT EXAM    17795 - IN DESTRUCTION BENIGN LESIONS UP TO 14   4. Benign neoplasm of skin of lower limb, including hip, right  HM DIABETES FOOT EXAM    71934 - IN DESTRUCTION BENIGN LESIONS UP TO 14   5. Pain in both feet  87351 - IN DEBRIDEMENT OF NAILS, 6 OR MORE    HM DIABETES FOOT EXAM    90550 - IN DESTRUCTION BENIGN LESIONS UP TO 14   6. Benign neoplasm of skin of lower limb, including hip, left  HM DIABETES FOOT EXAM    05779 - IN DESTRUCTION BENIGN LESIONS UP TO 14         Plan:   Pt was evaluated and examined. Patient was given personalized discharge instructions. Nails 1-10 were debrided in length and thickness sharply with a nail nipper and  without incident. Pt will follow up in 9 weeks or sooner if any problems arise. Diagnosis was discussed with the pt and all of their questions were answered in detail.  Proper foot hygiene and care

## 2020-06-10 NOTE — PROGRESS NOTES
nightly    Yes Historical Provider, MD       Review of Systems    Review of Systems:  History obtained from chart review and the patient  General ROS: negative for - chills, fatigue, fever, night sweats or weight gain  Constitutional: Negative for chills, diaphoresis, fatigue, fever and unexpected weight change. Musculoskeletal: Positive for arthralgias, gait problem and joint swelling. Neurological ROS: negative for - behavioral changes, confusion, headaches or seizures. Negative for weakness and numbness. Dermatological ROS: negative for - mole changes, rash  Cardiovascular: Negative for leg swelling. Gastrointestinal: Negative for constipation, diarrhea, nausea and vomiting. Lower Extremity Physical Examination:     Vitals: There were no vitals filed for this visit. General: AAO x 3 in NAD. Dermatologic Exam:  Skin lesion/ulceration Absent . Skin No rashes or nodules noted. .       Musculoskeletal:     1st MPJ ROM decreased, Bilateral.  Muscle strength 5/5, Bilateral.  Pain present upon palpation of left foot doreally . Medial longitudinal arch, Bilateral WNL. Ankle ROM WNL,Bilateral.    Dorsally contracted digits absent digits 1-5 Bilateral.     Vascular: DP and PT pulses palpable 1/4, Bilateral.  CFT <3 seconds, Bilateral.  Hair growth present to the level of the digits, Bilateral.  Edema absent, Bilateral.  Varicosities absent, Bilateral. Erythema absent, Bilateral    Neurological: Sensation intact to light touch to level of digits, Bilateral.  Protective sensation intact 10/10 sites via 5.07/10g [place]-Lars Monofilament, Bilateral.  negative Tinel's, Bilateral.  negative Valleix sign, Bilateral.      Integument: Warm, dry, supple, Bilateral.  Open lesion absent, Bilateral.  Interdigital maceration absent to web spaces 1-4, Bilateral.  Nails are normal in length, thickness and color 1-5 bilateral.  Fissures absent, Bilateral.       X rays left foot 3 views:  No fracture or dislcoation. Osteopenia noted throughtout the foot  Asessment: Patient is a [de-identified] y.o. female with:    Diagnosis Orders   1. Left foot pain  XR FOOT LEFT (MIN 3 VIEWS)   2. Edema of left lower leg due to peripheral venous insufficiency  XR FOOT LEFT (MIN 3 VIEWS)         Plan: Patient examined and evaluated. Current condition and treatment options discussed in detail. Advised pt to her coonditon and left foot x rays. Pt to start taking her lasik again. Pt to use stiff soled shoes and if painful use epson salts soaks  Verbal and written instructions given to patient. Contact office with any questions/problems/concerns. No orders of the defined types were placed in this encounter. No orders of the defined types were placed in this encounter. RTC in 6week(s).     6/10/2020      Electronically signed by Arielle Breen DPM on 6/10/2020 at 9:17 AM  6/10/2020

## 2020-07-29 NOTE — PROGRESS NOTES
[facility] [address] [id number] [address]  Dept: [phone]    DIABETIC PROGRESS NOTE  Date of patient's visit: 7/29/2020  Patient's Name:  Abdulkadir Bains YOB: 1940            Patient Care Team:  PHILIP Caballero CNP as PCP - General (Nurse Practitioner)  PHILIP Caballero CNP as PCP - [facility]  Damaso Dozier MD as Consulting Physician (Cardiology)  Jaimie Mcintyre as Consulting Physician (Endocrinology)  Payal Hameed MD as Consulting Physician (Urology)  Eber Litten, MD as Consulting Physician (Internal Medicine)  Sveta Cullen MD as Consulting Physician (General Surgery)          Chief Complaint   Patient presents with    Diabetes     TN    Peripheral Neuropathy       Subjective:   Abdulkadir Bains comes to clinic for Diabetes (TN) and Peripheral Neuropathy    she is a diabetic and states that needs toenails trimmed. Pt currently has complaint of thickened, elongated nails that they cannot manage by themselves. Pt's primary care physician is PHILIP Caballero CNP last seen 1/20/20   Pt's last blood sugar was patient states unknown . Pt also relates to painful skin spots to the bottom of right foot  Pt has tried pads and changing shoes but it has note helped the pain      Pt has a new complaint of NONE. Lab Results   Component Value Date    LABA1C 8.2 03/01/2017      Complains of numbness in the feet bilat. Past Medical History:   Diagnosis Date    Allergic rhinitis     Atrial fibrillation (HCC)     CAD (coronary artery disease)     s/p stents  total x5    Cholelithiasis     Chronic cough 9/14/2011    Chronic cystitis 9/14/2011    Colitis 09/2016    GERD 9/14/2011    Gout     Guillain-[place] ([facility] [address]     history of     Hyperlipidemia     Hypertension     Irritable bowel syndrome     Migraines     hx.  of    Multiple myeloma ([facility] [address]     OA (osteoarthritis) 9/14/2011    Osteoarthritis     Osteopenia     Pneumonia     Post-menopausal     vaginal atrophy    Pulmonary nodules     Raynaud's disease     S/P cardiac cath     x4    Sleep apnea     no machine at night.  Thyroid disease     Hypothyroid    Type 2 diabetes mellitus 9/14/2011    Type II or unspecified type diabetes mellitus without mention of complication, not stated as uncontrolled        Allergies   Allergen Reactions    Hepatitis B Vaccine     Acetaminophen     Oxycodone Hcl     Hepatitis B Virus Vaccines Other (See Comments)     Guillian-[place] (toxic reaction)    Norvasc  [Amlodipine]     Norvasc [Amlodipine Besylate]      Swelling. COMMON SIDE EFFECT      Tramadol      Other reaction(s): Intolerance  Seizures felt to be related    Fentanyl Nausea And Vomiting    Percocet [Oxycodone-Acetaminophen] Nausea And Vomiting    Velcade [Bortezomib] Diarrhea and Rash     Current Outpatient Medications on File Prior to Visit   Medication Sig Dispense Refill    allopurinol (ZYLOPRIM) 300 MG tablet TAKE 1 TABLET BY MOUTH ONE TIME A DAY  30 tablet 0    vitamin D (ERGOCALCIFEROL) 1.25 MG (41836 UT) CAPS capsule TAKE 1 CAPSULE BY MOUTH ONE TIME A WEEK  4 capsule 0    Misc.  Devices MISC 1 PAIR OF DIABETIC SHOES (1 LEFT/ 1 RIGHT)  1-3 PAIRS OF INSERTS (LEFT/ RIGHT) 2 each 0    apixaban (ELIQUIS) 5 MG TABS tablet Take 1 tablet by mouth 2 times daily 60 tablet 2    Lancets (ONETOUCH DELICA PLUS [id number]) MISC TEST BLOOD SUGAR THREE TIMES DAILY      K Phos [place]-Sod Phos Di & Mono (PHOSPHA 250 NEUTRAL) 155-852-130 MG TABS Take 1 tablet by mouth daily 14 tablet 0    acyclovir (ZOVIRAX) 200 MG capsule TAKE 1 CAPSULE BY MOUTH TWO TIMES A DAY      bortezomib (VELCADE) 3.5 MG chemo innjection Infuse 4 mg intravenously every 14 days 2 mg 0    clopidogrel (PLAVIX) 75 MG tablet Take 1 tablet by mouth daily 90 tablet 1    isosorbide mononitrate (IMDUR) 30 MG extended release tablet Take 30 mg by mouth daily       pantoprazole (PROTONIX) 20 MG tablet TAKE 1 TABLET BY MOUTH ONE TIME A DAY  90 tablet 10    torsemide (DEMADEX) 20 MG tablet Take 20 mg by mouth 2 times daily      albuterol sulfate HFA (PROVENTIL HFA) 108 (90 Base) MCG/ACT inhaler Inhale 2 puffs into the lungs every 6 hours as needed for Wheezing 1 Inhaler 3    Multiple Vitamins-Minerals (THERAPEUTIC MULTIVITAMIN-MINERALS) tablet Take 1 tablet by mouth daily 30 tablet 11    fluticasone-salmeterol (ADVAIR DISKUS) 100-50 MCG/DOSE diskus inhaler INHALE ONE PUFF BY MOUTH EVERY 12 HOURS 60 each 3    carvedilol (COREG) 3.125 MG tablet Take 3.125 mg by mouth 2 times daily      Multiple Vitamins-Minerals (PRESERVISION AREDS) CAPS Take 1 capsule by mouth nightly      rosuvastatin (CRESTOR) 20 MG tablet Take 20 mg by mouth daily      levothyroxine (SYNTHROID) 75 MCG tablet Take 75 mcg by mouth Daily      insulin 70-30 (NOVOLIN 70/30) (70-30) 100 UNIT per ML injection vial Inject 30 Units into the skin 2 times daily       ondansetron (ZOFRAN ODT) 4 MG disintegrating tablet Take 1 tablet by mouth every 8 hours as needed for Nausea 20 tablet 0    insulin aspart (NOVOLOG) 100 UNIT/ML injection vial Inject 6 Units into the skin 2 times daily (before meals)       nitroGLYCERIN (NITROSTAT) 0.4 MG SL tablet Place 1 tablet under the tongue every 5 minutes as needed for Chest pain 25 tablet 3    Biotin 5000 MCG TABS Take 1 tablet by mouth nightly       gabapentin (NEURONTIN) 100 MG capsule TAKE 1 CAPSULE BY MOUTH TWO TIMES A DAY  180 capsule 1     No current facility-administered medications on file prior to visit. Review of Systems    Review of Systems:   History obtained from chart review and the patient  General ROS: negative for - chills, fatigue, fever, night sweats or weight gain  Constitutional: Negative for chills, diaphoresis, fatigue, fever and unexpected weight change.   Musculoskeletal: Positive for arthralgias, gait problem and joint Left    Thickness  [x] [x] [x] [x] [x] [x] [x] [x] [x] [x]  5 4 3 2 1 1 2 3 4 5                         Right                                        Left    Dystrophic Changes   [x] [x] [x] [x] [x] [x] [x] [x] [x] [x]  5 4 3 2 1 1 2 3 4 5                         Right                                        Left    Color   [x] [x] [x] [x] [x] [x] [x] [x] [x] [x]  5 4 3 2 1 1 2 3 4 5                          Right                                        Left    Incurvation/Ingrowin   [] [] [] [] [] [] [] [] [] []  5 4 3 2 1 1 2 3 4 5                         Right                                        Left    Inflammation/Pain   [x] [x] [x] [x] [x] [x] [x] [x] [x] [x]  5 4 3 2 1 1 2 3 4 5                         Right                                        Left        Visual inspection:  Deformity: hammertoe deformity sharda feet  amputation: absent  Skin lesions: present - as above  Edema: right- 2+ pitting edema, left- 2+ pitting edema    Sensory exam:  Monofilament sensation: abnormal - 6/10 via SW 5.07/10g monofilament to the plantar foot bilateral feet    Pulses: abnormal - 1/4 dorsalis pedis pulse and 1/4 Posterior tibial pulse,   Pinprick: Impaired  Proprioception: Impaired  Vibration (128 Hz): Impaired       DM with PVD       [x]Yes    []No      Assessment:  [de-identified] y.o. female with:   Diagnosis Orders   1. Type II diabetes mellitus with peripheral circulatory disorder (HCC)  41961 - [place] DEBRIDEMENT OF NAILS, 6 OR MORE     DIABETES FOOT EXAM    44470 - [place] DESTRUCTION BENIGN LESIONS UP TO 14   2. Dermatophytosis of nail  21235 - [place] DEBRIDEMENT OF NAILS, 6 OR MORE     DIABETES FOOT EXAM   3. Peripheral vascular disorder (HCC)  21168 - [place] DEBRIDEMENT OF NAILS, 6 OR MORE    HM DIABETES FOOT EXAM    02569 - [place] DESTRUCTION BENIGN LESIONS UP TO 14   4. Benign neoplasm of skin of lower limb, including hip, right   DIABETES FOOT EXAM    00203 - [place] DESTRUCTION BENIGN LESIONS UP TO 14   5.  Pain in both feet  60739 - [place] DEBRIDEMENT OF NAILS, 6 OR MORE     DIABETES FOOT EXAM    42487 - [place] DESTRUCTION BENIGN LESIONS UP TO 14         Q7   []Yes    []No                Q8   [x]Yes    []No                     Q9   []Yes    []No    Plan:   Pt was evaluated and examined. Patient was given personalized discharge instructions. The lesion was partially excised and Pyrogallic acid was applied under occlusion. The patient will leave in place for 24-48 hours and than remove. The patient tolerated the procedure well and without complication. Nails 1-10 were debrided sharply in length and thickness with a nipper and , without incident. Pt will follow up in 9 weeks or sooner if any problems arise. Diagnosis was discussed with the pt and all of their questions were answered in detail. Proper foot hygiene and care was discussed with the pt. Informed patient on proper diabetic foot care and importance of tight glycemic control. Patient to check feet daily and contact the office with any questions/problems/concerns.    Other comorbidity noted and will be managed by PCP.  7/29/2020    Electronically signed by Ritchie Aguilar DPM on 7/29/2020 at 1:14 PM  7/29/2020

## 2020-09-14 PROBLEM — Q79.59: Status: ACTIVE | Noted: 2019-01-18

## 2020-09-14 PROBLEM — Z98.890 HISTORY OF CARDIOVASCULAR SURGERY: Status: ACTIVE | Noted: 2019-01-18

## 2020-09-14 PROBLEM — K44.9 DIAPHRAGMATIC HERNIA: Status: ACTIVE | Noted: 2019-01-18

## 2020-09-14 PROBLEM — I13.10 HYPERTENSIVE HEART AND KIDNEY DISEASE: Status: ACTIVE | Noted: 2019-01-18

## 2020-09-14 PROBLEM — K92.9 DISORDER OF DIGESTIVE TRACT: Status: ACTIVE | Noted: 2019-01-18

## 2020-09-14 PROBLEM — E83.39 HYPOPHOSPHATEMIA: Status: ACTIVE | Noted: 2020-01-01

## 2020-09-14 PROBLEM — Z80.3 FAMILY HISTORY OF BREAST CANCER: Status: ACTIVE | Noted: 2019-01-18

## 2020-09-30 NOTE — PROGRESS NOTES
30 [facility] [address]094 [id number] [address]  Dept: [phone]    DIABETIC PROGRESS NOTE  Date of patient's visit: 9/30/2020  Patient's Name:  Leonard Harris YOB: 1940            Patient Care Team:  Danell Dakins, APRN - CNP as PCP - General (Nurse Practitioner)  Danell Dakins, APRN - CNP as PCP - [facility] Empaneled Provider  Mami Amin MD as Consulting Physician (Cardiology)  Antonio Fernandez as Consulting Physician (Endocrinology)  Judge Citlaly MD as Consulting Physician (Urology)  Molina Smith MD as Consulting Physician (Internal Medicine)  Queenie Sorenson MD as Consulting Physician (General Surgery)          Chief Complaint   Patient presents with    Diabetes    Nail Problem    Peripheral Neuropathy       Subjective:   Leonard Harris comes to clinic for Diabetes; Nail Problem; and Peripheral Neuropathy    she is a diabetic and states that she checks her feet daily. Pt currently has complaint of thickened, elongated nails that they cannot manage by themselves. Pt's primary care physician is Danell Dakins, APRN - CNP last seen September 14 2020   Pt's last blood sugar was 209 . Pt also relates to painful skin spots to the bottom of both feet. Pt has tried pads and changing shoes but it has note helped the pain    Pt has a new complaint of NONE. Lab Results   Component Value Date    LABA1C 8.2 03/01/2017      Complains of numbness in the feet bilat. Past Medical History:   Diagnosis Date    Allergic rhinitis     Atrial fibrillation (HCC)     CAD (coronary artery disease)     s/p stents  total x5    Cholelithiasis     Chronic cough 9/14/2011    Chronic cystitis 9/14/2011    Colitis 09/2016    GERD 9/14/2011    Gout     Guillain-[place] ([facility] [address]     history of     Hyperlipidemia     Hypertension     Irritable bowel syndrome     Migraines     hx.  of    Multiple myeloma (HCC)     OA (osteoarthritis) 9/14/2011    Osteoarthritis     Osteopenia     Pneumonia     Post-menopausal     vaginal atrophy    Pulmonary nodules     Raynaud's disease     S/P cardiac cath     x4    Sleep apnea     no machine at night.  Thyroid disease     Hypothyroid    Type 2 diabetes mellitus 9/14/2011    Type II or unspecified type diabetes mellitus without mention of complication, not stated as uncontrolled        Allergies   Allergen Reactions    Hepatitis B Vaccine     Acetaminophen     Oxycodone Hcl     Hepatitis B Virus Vaccines Other (See Comments)     Guillian-[place] (toxic reaction)    Norvasc  [Amlodipine]     Norvasc [Amlodipine Besylate]      Swelling. COMMON SIDE EFFECT      Tramadol      Other reaction(s): Intolerance  Seizures felt to be related    Fentanyl Nausea And Vomiting    Percocet [Oxycodone-Acetaminophen] Nausea And Vomiting    Velcade [Bortezomib] Diarrhea and Rash     Current Outpatient Medications on File Prior to Visit   Medication Sig Dispense Refill    famotidine (PEPCID) 20 MG tablet Take 20 mg by mouth daily      vitamin D (ERGOCALCIFEROL) 1.25 MG (18487 UT) CAPS capsule TAKE 1 CAPSULE BY MOUTH ONE TIME A WEEK  4 capsule 0    ELIQUIS 5 MG TABS tablet TAKE 1 TABLET BY MOUTH TWO TIMES A DAY  180 tablet 1    allopurinol (ZYLOPRIM) 300 MG tablet TAKE 1 TABLET BY MOUTH ONE TIME A DAY  90 tablet 3    Misc.  Devices MISC 1 PAIR OF DIABETIC SHOES (1 LEFT/ 1 RIGHT)  1-3 PAIRS OF INSERTS (LEFT/ RIGHT) 2 each 0    Lancets (ONETOUCH DELICA PLUS [id number]) MISC TEST BLOOD SUGAR THREE TIMES DAILY      K Phos [place]-Sod Phos Di & Mono (PHOSPHA 250 NEUTRAL) 155-852-130 MG TABS Take 1 tablet by mouth daily 14 tablet 0    acyclovir (ZOVIRAX) 200 MG capsule TAKE 1 CAPSULE BY MOUTH TWO TIMES A DAY      bortezomib (VELCADE) 3.5 MG chemo innjection Infuse 4 mg intravenously every 14 days 2 mg 0    clopidogrel (PLAVIX) 75 MG tablet Take 1 tablet by mouth daily 90 tablet 1    isosorbide mononitrate change. Musculoskeletal: Positive for arthralgias, gait problem and joint swelling. Neurological ROS: negative for - behavioral changes, confusion, headaches or seizures. Negative for weakness and numbness. Dermatological ROS: negative for - mole changes, rash  Cardiovascular: Negative for leg swelling. Gastrointestinal: Negative for constipation, diarrhea, nausea and vomiting. Objective:  Dermatologic Exam:  Skin lesion present to the right and left plantar foot with a central core and petchaie noted to the lesions periphery. Pain on palpation of the lesion     Skin is thin, with flaky sloughing skin as well as decreased hair growth to the lower leg  Small red hemosiderin deposits seen dorsal foot   Musculoskeletal:     1st MPJ ROM decreased, Bilateral.  Muscle strength 5/5, Bilateral.  Pain present upon palpation of toenails 1-5, Bilateral. decreased medial longitudinal arch, Bilateral.  Ankle ROM decreased,Bilateral.    Dorsally contracted digits present digits 2, Bilateral.     Vascular: DP pulses 1/4 bilateral.  PT pulses 0/4 bilateral.   CFT <5 seconds, Bilateral.  Hair growth absent to the level of the digits, Bilateral.  Edema present, Bilateral.  Varicosities absent, Bilateral. Erythema absent, Bilateral    Neurological: Sensation diminshed to light touch to level of digits, Bilateral.  Protective sensation intact 6/10 sites via 5.07/10g [place] Monofilament, Bilateral.  negative Tinel's, Bilateral.  negative Valleix sign, Bilateral.      Integument: Warm, dry, supple, Bilateral.  Open lesion absent, Bilateral.  Interdigital maceration absent to web spaces 4, Bilateral.  Nails 1-5 left and 1-5 right thickened > 3.0 mm, dystrophic and crumbly, discolored with subungual debris. Fissures absent, Bilateral.   General: AAO x 3 in NAD.     Derm  Toenail Description  Sites of Onychomycosis Involvement (Check affected area)  [x] [x] [x] [x] [x] [x] [x] [x] [x] [x]  5 4 3 2 1 1 2 3 4 5 Right                                        Left    Thickness  [x] [x] [x] [x] [x] [x] [x] [x] [x] [x]  5 4 3 2 1 1 2 3 4 5                         Right                                        Left    Dystrophic Changes   [x] [x] [x] [x] [x] [x] [x] [x] [x] [x]  5 4 3 2 1 1 2 3 4 5                         Right                                        Left    Color   [x] [x] [x] [x] [x] [x] [x] [x] [x] [x]  5 4 3 2 1 1 2 3 4 5                          Right                                        Left    Incurvation/Ingrowin   [] [] [] [] [] [] [] [] [] []  5 4 3 2 1 1 2 3 4 5                         Right                                        Left    Inflammation/Pain   [x] [x] [x] [x] [x] [x] [x] [x] [x] [x]  5 4 3 2 1 1 2 3 4 5                         Right                                        Left        Visual inspection:  Deformity: hammertoe deformity sharda feet  amputation: absent  Skin lesions: present - as above  Edema: right- 2+ pitting edema, left- 2+ pitting edema    Sensory exam:  Monofilament sensation: abnormal - 6/10 via SW 5.07/10g monofilament to the plantar foot bilateral feet    Pulses: abnormal - 1/4 dorsalis pedis pulse and 1/4 Posterior tibial pulse,   Pinprick: Impaired  Proprioception: Impaired  Vibration (128 Hz): Impaired       DM with PVD       [x]Yes    []No      Assessment:  [de-identified] y.o. female with:   Diagnosis Orders   1. Type II diabetes mellitus with peripheral circulatory disorder (HCC)  39188 - [place] DEBRIDEMENT OF NAILS, 6 OR MORE    HM DIABETES FOOT EXAM    45274 - [place] DESTRUCTION BENIGN LESIONS UP TO 14   2. Dermatophytosis of nail  79703 - [place] DEBRIDEMENT OF NAILS, 6 OR MORE    HM DIABETES FOOT EXAM   3. Peripheral vascular disorder (HCC)  70996 - [place] DEBRIDEMENT OF NAILS, 6 OR MORE    HM DIABETES FOOT EXAM    90630 - [place] DESTRUCTION BENIGN LESIONS UP TO 14   4.  Pain in both feet  23034 - [place] DEBRIDEMENT OF NAILS, 6 OR MORE    HM DIABETES FOOT EXAM    00217 - [place] DESTRUCTION BENIGN LESIONS UP TO 14   5. Benign neoplasm of skin of lower limb, including hip, right  HM DIABETES FOOT EXAM    74363 - [place] DESTRUCTION BENIGN LESIONS UP TO 14   6. Benign neoplasm of skin of lower limb, including hip, left  HM DIABETES FOOT EXAM    32334 - [place] DESTRUCTION BENIGN LESIONS UP TO 14           Q7   []Yes    []No                Q8   [x]Yes    []No                     Q9   []Yes    []No    Plan:   Pt was evaluated and examined. Patient was given personalized discharge instructions. The lesions were partially excised and Pyrogallic acid was applied under occlusion. The patient will leave in place for 24-48 hours and than remove. The patient tolerated the procedure well and without complication. Nails 1-10 were debrided sharply in length and thickness with a nipper and , without incident. Pt will follow up in 9 weeks or sooner if any problems arise. Diagnosis was discussed with the pt and all of their questions were answered in detail. Proper foot hygiene and care was discussed with the pt. Informed patient on proper diabetic foot care and importance of tight glycemic control. Patient to check feet daily and contact the office with any questions/problems/concerns.    Other comorbidity noted and will be managed by PCP.  9/30/2020    Electronically signed by Kenan Milan DPM on 9/30/2020 at 1:15 PM  9/30/2020

## 2020-11-03 PROBLEM — N17.9 AKI (ACUTE KIDNEY INJURY) (HCC): Status: RESOLVED | Noted: 2019-08-09 | Resolved: 2020-01-01

## 2020-11-17 NOTE — PROGRESS NOTES
I signed up for this patient in error, I did not participate in the evaluation or treatment of this patient.     Electronically signed by Sofiya Gerard DO on 11/16/2020 at 10:22 PM

## 2020-11-17 NOTE — PROGRESS NOTES
Family at bedside, requesting CPR efforts to stop and pt to be provided comfort care. Dr Jose Alejandro Oshea at bedside verbally orders extubation, pt to be placed on nasal canula for comfort. Patient extubated per physician order. Patient extubated in usual fashion. Patient placed on  4 liters/min via nasal cannula.      Mona Velasquez   11:34 PM

## 2020-11-17 NOTE — FLOWSHEET NOTE
[facility] Jimy Simons 82   Patient Death Note  DEATH   Shift date: 2020    Shift day: Monday  Shift #: 2                 Room #    Name: Talon Gilliland            Age: [de-identified] y.o. Gender: female          Hoahaoism: [address] of Buddhist: Bahai  Admit Date & Time: 2020 10:14 PM     Referral: Nurse   Actual date of death: 2020   TOD: 2342       SITUATION AT DEATH:  Cardiac Arrest    IS THIS A 'S CASE? No    SPIRITUAL/EMOTIONAL INTERVENTION:  Maintained presence for family to process medical situation, discuss DNR, and facilitated conference. Provided prayer for patient and family. Assisted in end of life process. Gathered information for release of body form. Family Received Grief Packet? Yes       NAME AND PHONE NUMBER OF DOCTOR SIGNING DEATH CERTIFICATE:  Unknown at this time.  will contact the  home  (Please note which nurse you spoke with to confirm the  home will be contacted. Family should not be listed)    Copy of COMPLETED Release of Body Form Received? No     HOME:  Name: Unknown  City: Unknown  Phone Number: Unknown    NEXT OF KIN:  Name: Apolonia Tee  Relationship:   Street Address: [address] [place] [address] Avenue: [facility]: [place]  Zip code: [address]   Phone Number: [address]? Yes    IF SO, WHAT? [facility]    Electronically signed by Honorio Scott on 2020 at 12:40 AM.  [facility]  [phone]       20 2220   Encounter Summary   Services provided to: Patient; Family   Referral/Consult From: Physician;Nurse   Support System Spouse; Children   Continue Visiting   ([phone])   Complexity of Encounter High   Length of Encounter 2 hours   Spiritual Assessment Completed Yes   Grief and Life Adjustment   Type Death   Assessment Tearful; Anxious;Grieving; Shock;Unresolved grief;Concerns with

## 2020-11-17 NOTE — CODE DOCUMENTATION
Patient intubated by Dr. Paris Ragsdale. 25 cm at the teeth. Bilateral breath sounds auscultated with no sounds over the epigastric area. Positive color change.

## 2020-11-17 NOTE — ED PROVIDER NOTES
[facility]  ED  Emergency Department Encounter  EmergencyMedicine Resident     Pt Name:Lia Mclaughlin  MRN: [id number]  [birth year]ffrieda 1940  Date of evaluation: 11/16/20  PCP:  PHILIP Baumann CNP    CHIEF COMPLAINT       Chief Complaint   Patient presents with    Cardiac Arrest       HISTORY OF PRESENT ILLNESS  (Location/Symptom, Timing/Onset, Context/Setting, Quality, Duration, Modifying Factors, Severity.)      Thealejandro Morrell is a [de-identified] y.o. female who presents as a cardiac arrest.  Per EMS patient was found to be in PEA arrest though there were several episodes of what appeared to be ventricular fibrillation -- patient was cardioverted 3 times -- patient given epi but no amiodarone prior to arrival.    On arrival the patient had ongoing compressions PEA I last pulse check, had a supraglottic airway in place with end-tidal in the high 20s. Return of spontaneous circulation was declared shortly after arrival.  EMS also reported that patient was breathing against a Augie airway. See ED course at the bottom for further events. Patient has history of coronary artery disease status post 5 stents, thyroid disease sleep apnea diabetes multiple myeloma ongoing chemotherapy history of Guyon [place] I and atrial fibrillation. Collateral history from family states that the  found the wife down on the ground. She did get an injection from her oncologist today as well.   No bystander CPR reported, downtime unknown, 60 minutes of total CPR prior to arrival.    Per EMS and chart review there is no CODE STATUS in place    PAST MEDICAL / SURGICAL / SOCIAL / FAMILY HISTORY      has a past medical history of Allergic rhinitis, Atrial fibrillation ([address] CAD (coronary artery disease), Cholelithiasis, Chronic cough, Chronic cystitis, Colitis, GERD, Gout, Guillain-[place]land ([address] Hyperlipidemia, Hypertension, Irritable bowel syndrome, Migraines, Multiple myeloma ([address] OA (osteoarthritis), Osteoarthritis, Osteopenia, Pneumonia, Post-menopausal, Pulmonary nodules, Raynaud's disease, S/P cardiac cath, Sleep apnea, Thyroid disease, Type 2 diabetes mellitus, and Type II or unspecified type diabetes mellitus without mention of complication, not stated as uncontrolled. has a past surgical history that includes Knee arthroscopy (Right, 1993); Appendectomy (07/16/12); Colonoscopy (5/5/09); Upper gastrointestinal endoscopy (5/5/09); Cholecystectomy (4/1988); Coronary angioplasty with stent (1/18/2016); Cardiac catheterization (1999, 2009, 2013, 2016); Cardiac catheterization (10/22/13); Cardiac catheterization (1/16/18); Appendectomy; Umbilical hernia repair (1/820/2019); Coronary angioplasty with stent (05/2019); and Cardiac catheterization (01/14/2020). Social History     Socioeconomic History    Marital status:      Spouse name: Edd    Number of children: 2    Years of education: 15    Highest education level: Not on file   Occupational History    Not on file   Social Needs    Financial resource strain: Not hard at all   [employer] insecurity     Worry: Never true     Inability: Never true   [employer] needs     Medical: No     Non-medical: No   Tobacco Use    Smoking status: Never Smoker    Smokeless tobacco: Never Used   Substance and Sexual Activity    Alcohol use: Not Currently     Alcohol/week: 1.0 standard drinks     Types: 1 Standard drinks or equivalent per week     Frequency: Monthly or less     Drinks per session: 1 or 2     Binge frequency: Monthly     Comment: social    Drug use: No    Sexual activity: Not on file   Lifestyle    Physical activity     Days per week: 0 days     Minutes per session: 0 min    Stress:  To some extent   Relationships    Social connections     Talks on phone: More than three times a week     Gets together: Once a week     Attends Rastafari service: 1 to 4 times per year     Active member of club or organization: No     Attends meetings of clubs or organizations: Never     Relationship status:     Intimate partner violence     Fear of current or ex partner: No     Emotionally abused: Not on file     Physically abused: Not on file     Forced sexual activity: No   Other Topics Concern    Not on file   Social History Narrative    Not on file       Family History   Problem Relation Age of Onset    Diabetes Mother     Hypertension Mother     Heart Disease Mother     Stroke Mother     Hypertension Father     Heart Disease Father     Stroke Father     Hypertension Sister     Rheum Arthritis Sister     Diabetes Brother     Hypertension Brother     Kidney Disease Brother     Breast Cancer Daughter     Cancer Daughter         breast    Hypertension Brother     Crohn's Disease Son     Rheum Arthritis Sister        Allergies:  Hepatitis b vaccine; Acetaminophen; Oxycodone hcl; Hepatitis b virus vaccines; Norvasc  [amlodipine]; Norvasc [amlodipine besylate]; Tramadol; Fentanyl; Percocet [oxycodone-acetaminophen]; and Velcade [bortezomib]    Home Medications:  Prior to Admission medications    Medication Sig Start Date End Date Taking?  Authorizing Provider   gabapentin (NEURONTIN) 100 MG capsule TAKE 1 CAPSULE BY MOUTH TWO TIMES A DAY  11/13/20 12/13/20  PHILIP Davis CNP   pantoprazole (PROTONIX) 20 MG tablet TAKE 1 TABLET BY MOUTH ONE TIME A DAY  11/13/20   PHILIP Davis CNP   vitamin D (ERGOCALCIFEROL) 1.25 MG (19373 UT) CAPS capsule TAKE 1 CAPSULE BY MOUTH ONE TIME A WEEK  11/6/20   Jenna Kate MD   lenalidomide (REVLIMID) 2.5 MG chemo capsule Take 1 capsule by mouth daily 10/30/20   PHILIP Davis CNP   Magnesium 400 MG TABS Take 1 tablet by mouth Daily 10/9/20   PHILIP Davis CNP   famotidine (PEPCID) 20 MG tablet Take 20 mg by mouth daily 9/26/20   Historical Provider, MD   Insulin Degludec (TRESIBA FLEXTOUCH) 100 UNIT/ML SOPN Inject into the skin    Historical Provider, MD   ELIQURHONDA 5 MG TABS tablet TAKE 1 TABLET BY MOUTH TWO TIMES A DAY  8/17/20   PHILIP Espitia CNP   allopurinol (ZYLOPRIM) 300 MG tablet TAKE 1 TABLET BY MOUTH ONE TIME A DAY  8/17/20   PHILIP Espitia CNP   Misc.  Devices MISC 1 PAIR OF DIABETIC SHOES (1 LEFT/ 1 RIGHT)  1-3 PAIRS OF INSERTS (LEFT/ RIGHT) 5/27/20   Audrey Richards DPM   Lancets ([address]) [address] TEST BLOOD SUGAR THREE TIMES DAILY 3/24/20   Historical Provider, MD   K Phos [place]-Sod Phos Di & Mono (PHOSPHA 250 NEUTRAL) [phone] MG TABS Take 1 tablet by mouth daily 2/24/20   Rosalina Santos MD   acyclovir (ZOVIRAX) 200 MG capsule TAKE 1 CAPSULE BY MOUTH TWO TIMES A DAY 1/29/20   Historical Provider, MD   bortezomib (VELCADE) 3.5 MG chemo innjection Infuse 4 mg intravenously every 14 days 1/26/20   Carine Wellington MD   clopidogrel (PLAVIX) 75 MG tablet Take 1 tablet by mouth daily 12/4/19   PHILIP Espitia CNP   isosorbide mononitrate (IMDUR) 30 MG extended release tablet Take 30 mg by mouth daily  10/29/19   Historical Provider, MD   torsemide (DEMADEX) 20 MG tablet Take 20 mg by mouth 2 times daily    Historical Provider, MD   albuterol sulfate HFA (PROVENTIL HFA) 108 (90 Base) MCG/ACT inhaler Inhale 2 puffs into the lungs every 6 hours as needed for Wheezing 8/13/19 12/1/22  PHILIP Espitia CNP   Multiple Vitamins-Minerals (THERAPEUTIC MULTIVITAMIN-MINERALS) tablet Take 1 tablet by mouth daily 8/13/19 8/12/20  PHILIP Espitia CNP   fluticasone-salmeterol (ADVAIR DISKUS) 100-50 MCG/DOSE diskus inhaler INHALE ONE PUFF BY MOUTH EVERY 12 HOURS 8/9/19   Carine Wellington MD   carvedilol (COREG) 3.125 MG tablet Take 3.125 mg by mouth 2 times daily    Historical Provider, MD   Multiple Vitamins-Minerals (PRESERVISION AREDS) CAPS Take 1 capsule by mouth nightly    Historical Provider, MD   rosuvastatin (CRESTOR) 20 MG tablet Take 20 mg by mouth daily    Historical Provider, MD   levothyroxine (SYNTHROID) 75 MCG tablet Take 75 DIAGNOSIS     PLAN (LABS / IMAGING / EKG):  Orders Placed This Encounter   Procedures    XR CHEST PORTABLE    BLOOD GAS, VENOUS    Brain Natriuretic Peptide    MAGNESIUM    TOX SCR, BLD, ED    Troponin    Inpatient consult to Cardiology    EKG 12 Lead    EKG 12 Lead       MEDICATIONS ORDERED:  Orders Placed This Encounter   Medications    DISCONTD: morphine 4 MG/ML injection     Whatley Simmonds: cabinet override    DISCONTD: morphine 4 MG/ML injection     BETTY GOMEZ: cabinet override    DISCONTD: morphine injection 2 mg    DISCONTD: morphine injection 4 mg    DISCONTD: glycopyrrolate injection 0.2 mg    DISCONTD: LORazepam (ATIVAN) injection 1 mg    EPINEPHrine 1 MG/10ML injection    etomidate (AMIDATE) injection    succinylcholine (ANECTINE) injection    EPINEPHrine PF 1 MG/ML injection    calcium chloride 10 % injection    sodium bicarbonate 8.4 % injection    norepinephrine (LEVOPHED) 16 mg in sodium chloride 0.9 % 250 mL infusion    0.9 % sodium chloride infusion           DIAGNOSTIC RESULTS / EMERGENCY DEPARTMENT COURSE / MDM     LABS:  Results for orders placed or performed during the hospital encounter of 11/16/20   Lactate, Sepsis   Result Value Ref Range    Lactic Acid, Sepsis NOT REPORTED 0.5 - 1.9 mmol/L    Lactic Acid, Sepsis, Whole Blood 10.1 (H) 0.5 - 1.9 mmol/L   Brain Natriuretic Peptide   Result Value Ref Range    Pro-BNP 5,067 (H) <300 pg/mL    BNP Interpretation Pro-BNP Reference Range:    MAGNESIUM   Result Value Ref Range    Magnesium 2.1 1.6 - 2.6 mg/dL   TOX SCR, BLD, ED   Result Value Ref Range    Acetaminophen Level <5 (L) 10 - 30 ug/mL    Ethanol <10 <10 mg/dL    Ethanol percent <9.105 <1.650 %    Salicylate Lvl <1 (L) 3 - 10 mg/dL    Toxic Tricyclic Sc,Blood NEGATIVE NEGATIVE   Troponin   Result Value Ref Range    Troponin, High Sensitivity 331 (HH) 0 - 14 ng/L    Troponin T NOT REPORTED <0.03 ng/mL    Troponin Interp NOT REPORTED    EKG 12 Lead   Result Value Ref Range    Ventricular Rate 45 BPM    Atrial Rate 79 BPM    QRS Duration 156 ms    Q-T Interval 486 ms    QTc Calculation (Bazett) 420 ms    R Axis 101 degrees    T Axis 92 degrees       RADIOLOGY:  Xr Chest Portable    Result Date: 11/16/2020  EXAMINATION: ONE XRAY VIEW OF THE CHEST 11/16/2020 10:54 pm COMPARISON: Chest two views January 11, 2020. CT chest pulmonary embolism with contrast January 11, 2020. HISTORY: ORDERING SYSTEM PROVIDED HISTORY: arrest TECHNOLOGIST PROVIDED HISTORY: arrest Reason for Exam: portable supine/ post arrest/ post intubation Acuity: Acute Type of Exam: Initial FINDINGS: Endotracheal tube is noted in place with the distal tip located 2.4 cm superior to the tameka. Nasogastric tube is identified with distal tip projecting in the region of the lumen of the stomach. Low lung volumes are seen with scattered bilateral lung atelectasis present. No definitive evidence of pleural effusion is identified. Heart is mildly enlarged. Cardiomediastinal silhouette is otherwise unremarkable. Bones and soft tissues are unremarkable. 1. Recommend retraction of endotracheal tube 2.0 cm. 2. Low lung volumes. 3. Mild cardiomegaly. EKG  Multiple EKGs were performed please see attending note cardiology note for collateral interpretations-initial EKG concerning for STEMI with ST elevation in inferior and lateral leads. Multiple episodes of wide QRS rhythms, bradycardia. All EKG's are interpreted by the Emergency Department Physician who either signs or Co-signs this chart in the absence of a cardiologist.    EMERGENCY DEPARTMENT COURSE/IMPRESSION:        See attending ED note below, I agree with the events described below    Patient is an 42-year-old female with significant past medical history of CAD as well as multiple myeloma currently on chemotherapy.    Patient apparently suffered a syncopal episode at home with fall in the kitchen and was found by EMS denied of a pulse with CPR/ACLS beginning. A Augie airway was placed prior to arrival and patient had multiple doses of epinephrine prior to arrival.   3 episodes of possible ventricular fibrillation with \"shocks given\" prior to arrival by EMS. On arrival patient was noted to have weak pulses and positive ROSC. Initial EKG with possible STEMI and cardiology was involved immediately. Shortly after the EKG patient began having bradycardia resulting in rearrest and ACLS begun. The cycle of rearresting after brief ROSC episodes continued multiple times with agonal PEA as the rhythm. Patient was even tried with intermittent capture transcutaneously that only worked minimally. Rearrests multiple times of followed. Cardiology fellow in the emergency room and long discussion with the ER resident physician as well as cardiology fellow with the family. Family was eventually brought in during coding effort and decision was made to make the patient DNR CC. They wish even extubation at this time and pressors to being stopped as well. Family in agreement to make the patient as comfortable as possible with no heroic efforts at this time. Pastoral care in the room as well as nursing and respiratory therapy all in agreement.         PROCEDURES:  PROCEDURE NOTE - EMERGENCY INTUBATION    PATIENT NAME: [address] [place] [address],[address] RECORD NO. [id number]  DATE: 11/17/2020  ATTENDING PHYSICIAN: more    PREOPERATIVE DIAGNOSIS:  Acute Respiratory Failure  POSTOPERATIVE DIAGNOSIS:  Same  PROCEDURE PERFORMED:  Emergency endotracheal intubation  PERFORMING PHYSICIAN: Lanie Francis MD    MEDICATIONS: etomidate intravenously and succinycholine intravenously    DISCUSSION:  Anita Aviles is a [de-identified]y.o.-year-old female who requires intubation and ventilatory support due to Respiratory failure and impending respiratory failure. The history and physical examination were reviewed and confirmed.       CONSENT: Unable to be obtained due to the emergent nature of this procedure. PROCEDURE:  A timeout was initiated by the bedside nurse and was confirmed by those present. The patient was placed in the appropriate position. Cricoid pressure was not required. Intubation was performed videoendoscope a 7.5 cuffed endotracheal tube. The cuff was then inflated and the tube was secured appropriately at a distance of 24 cm to the dental ridge. Initial confirmation of placement included bilateral breath sounds, an end tidal CO2 detector, tube fogging and adequate chest rise. A chest x-ray to verify correct placement of the tube showed tube requiring 2 cm retraction but not in the right mainstem. The patient tolerated the procedure as expected will continue with critical course    COMPLICATIONS:  None     Chan Jerome MD  7:06 AM, 20    CONSULTS:  IP CONSULT TO CARDIOLOGY    CRITICAL CARE:  None    FINAL IMPRESSION      1. Cardiac arrest [facility])          DISPOSITION / PLAN     DISPOSITION  2020 12:10:35 AM      PATIENT REFERRED TO:  No follow-up provider specified.     DISCHARGE MEDICATIONS:  Discharge Medication List as of 2020  3:07 AM          Chan Jerome MD  Emergency Medicine Resident    (Please note that portions of this note were completed with a voice recognition program.  Efforts were made to edit the dictations but occasionally words aremis-transcribed.)        Chan Jerome MD  Resident  [phone]

## 2020-11-17 NOTE — FLOWSHEET NOTE
[facility] [address]     Emergency/Trauma Note    PATIENT NAME: Talon Gilliland    Shift date: 11-16-20  Shift day: Monday   Shift # 3    Room # 12/12   Name: Talon Gilliland            Age: [de-identified] y.o. Gender: female          Anabaptism: [address] of Church: [address] (In [place])     Trauma/Incident type: Full Arrest  Admit Date & Time: 11/16/2020 10:14 PM    PATIENT/EVENT DESCRIPTION:  Talon Gilliland is a [de-identified] y.o. female who arrived via EMS from her home in [place] as a full arrest. Patient was cooking at home when her  heard her fall from the living room. The patient  called 911 right away. The patient entered the ED on a [employer] machine. Pt to be admitted to 12/12. SPIRITUAL ASSESSMENT/INTERVENTION:  Patients  Apolonia Tee arrived in the ED lobby.  was able to update him that the medical staff was working at the patients bedside. Medical staff was notified of his arrival.      PATIENT BELONGINGS:  No belongings noted    ANY BELONGINGS OF SIGNIFICANT VALUE NOTED:  None     REGISTRATION STAFF NOTIFIED? Yes      WHAT IS YOUR SPIRITUAL CARE PLAN FOR THIS PATIENT?:   Chaplains will remain available to offer spiritual and emotional support as needed.       Electronically signed by Sharon Mejía, on 11/16/2020 at 10:22 PM.  Fuad Riggs  [phone]     11/16/20 2499   Encounter Summary   Services provided to: Patient not available;Family   Referral/Consult From: Multi-disciplinary team   Support System Spouse   Place of [address]    Contact Druze No   Continue Visiting   (11/16/20)   Complexity of Encounter High   Length of Encounter 45 minutes   Spiritual Assessment Completed Yes   Crisis   Type ED Alert   Assessment Unable to respond   Intervention Sustaining presence/ Ministry of presence   Outcome Did not respond

## 2020-11-17 NOTE — SIGNIFICANT EVENT
I had a long discussion with the patient's family in person, in presence of the RN taking care of the patient. Patient's current clinical condition, laboratory and radiographic findings as well as recommendations of physicians consulted on the case were discussed with the patient's family in detail. All questions and concerns of family were addressed, and appropriate emotional support was provided. After understanding patient's current medical condition, family decided that did not want any lab draws or treatments aimed at prolonging the life of patient, at the cost of patient's overall comfort. They expressed their wishes to make the patient comfortable, stop all lab draws and not to do any resuscitative procedures on the patient. They requested patient's code status to be changed to [facility], and signed the [facility] order form in my presence, which was witnessed by RN,Alison Bay and Jeri Shaikh. Will honor family's wishes, and will change patient's code status to [facility]. Abdirahman Campos M.D.   Department of Internal Medicine,  \[facility] [place] [facility]\"")             11/16/2020, 11:36 PM

## 2020-11-17 NOTE — CODE DOCUMENTATION
8 mg of morphine given for comfort at 2325 per verbal order from Dr. Ezekiel Luque. Extubated at [phone].

## 2020-11-17 NOTE — ED NOTES
Bed: 12  Expected date: 11/16/20  Expected time: 9:48 PM  Means of arrival:   Comments:  [facility]  11/16/20 8824

## 2020-11-19 NOTE — PROGRESS NOTES
Dr. Abhijeet Vanegas rounded. Patient ambulated in hallway with standby assitance, developed jaw pain and SOB. Plan per cardiology is to do cardiac cath this admission. Orders received to dc eliquis and start heparin gtt in AM. Nephrology consulted for clearance. Dr. Abhijeet Vanegas would like patient transferred over to [address] [place] [address] tomorrow so patient can have cardiac cath Tuesday or Wednesday. [facility]. No complaints

## 2024-12-10 NOTE — FLOWSHEET NOTE
For Your Information     If your provider ordered any imaging for you today. Our pre-scheduling services will be reaching out to you within 2 business days to schedule this. Prescheduling Services can be reached by calling [phone].    If you are in need of a medication refill, please use one of the following options. You can expect your medication to be filled within 2-3 business days.   1. Call your pharmacy for all medication refills and renewals.   2. [facility]- [email or web address]  3. Call your providers office    If your provider ordered testing today, you will be notified of your lab results within 3-5 business days and imaging results within 7-14 business days, unless specified otherwise. If you have not received your results within the allotted business days please call your provider's office. Have you signed up for the [employer] [place], if not please consider doing so to receive results on the edmar as soon as they have been resulted by the system. [email or web address]     Medication Prior Authorizations may take up to 30 days for approval/denial.     You may be receiving a survey.  Please take the time to complete this, as your feedback is very important to us!  We strive to make your experience exceptional, and your comments help us with that goal.  We look forward to hearing from you!    For all future appointments please arrive 15 minutes prior to your scheduled visit.     Patient Contact Center Business Office: assistance with medical billing & financial inquires [phone]                  Did you receive exceptional care from a Medical Assistant?    Want to let them know they made a difference?    Please share with us by accessing the online nomination form:               Or ask the  for a paper nomination form.                                                                                                                                                 SPIRITUAL CARE DEPARTMENT - [address]  PROGRESS NOTE    Shift date: 5/10/19  Shift day: Saturday   Shift # 3    Room # 9805/2733-17   Name: Alona Mishra            Age: 77 y.o. Gender: female          Anglican:    Place of Mu-ism:     Referral: Routine Visit    Admit Date & Time: 5/10/2019  5:49 PM    PATIENT/EVENT DESCRIPTION:  Alona Mishra is a 77 y.o. female / female who is admitted in the hospital after cardiac cath and LAD stents x 2. she was admitted to [facility] for the management of  Unstable angina. Patient had sudden onset chest pain in middle of night. SPIRITUAL ASSESSMENT/INTERVENTION:   was rounding on CAR 3. Pt was open for spiritual care visit. Pt talked about rapid heart beat and requested prayer for better news from medical staff in the morning. Pt was calm and passive but very concerned about the outcome of previous test.  offered prayer and nurtured hope. pt expressed gratitude for visit. SPIRITUAL CARE FOLLOW-UP PLAN:  Chaplains will remain available to offer spiritual and emotional support as needed. Electronically signed by Maria Victoria Morales on 5/11/2019 at 6:32 AM.  [facility]  [phone]     05/10/19 1250   Encounter Summary   Services provided to: Patient   Referral/Consult From: Patient   Continue Visiting   (5/10/19)   Volunteer Visit Yes   Complexity of Encounter Low   Length of Encounter 15 minutes   Routine   Type Follow up   Assessment Calm; Approachable;Passive   Intervention Nurtured hope;Prayer   Outcome Comfort;Expressed gratitude; Concerns relieved   Advance Directives (For Healthcare)   Pre-existing DNR Comfort Care/DNR Arrest/DNI Order No   Healthcare Directive No, patient does not have an advance directive for healthcare treatment   If you are unable to speak for yourself, does your Healthcare Agent or Legal Spokesperson know your healthcare